# Patient Record
Sex: FEMALE | Race: WHITE | NOT HISPANIC OR LATINO | Employment: UNEMPLOYED | URBAN - METROPOLITAN AREA
[De-identification: names, ages, dates, MRNs, and addresses within clinical notes are randomized per-mention and may not be internally consistent; named-entity substitution may affect disease eponyms.]

---

## 2017-02-27 ENCOUNTER — AMBULATORY - HEALTHEAST (OUTPATIENT)
Dept: VASCULAR SURGERY | Facility: CLINIC | Age: 57
End: 2017-02-27

## 2017-02-27 DIAGNOSIS — C54.1 CANCER OF ENDOMETRIUM (H): ICD-10-CM

## 2017-02-27 DIAGNOSIS — Z85.42 HISTORY OF UTERINE CANCER: ICD-10-CM

## 2017-02-27 DIAGNOSIS — I89.0 ACQUIRED LYMPHEDEMA: ICD-10-CM

## 2017-02-27 DIAGNOSIS — R19.00 ABDOMINAL SWELLING: ICD-10-CM

## 2017-02-27 DIAGNOSIS — I89.0 SECONDARY LYMPHEDEMA: ICD-10-CM

## 2017-05-05 ENCOUNTER — COMMUNICATION - HEALTHEAST (OUTPATIENT)
Dept: VASCULAR SURGERY | Facility: CLINIC | Age: 57
End: 2017-05-05

## 2017-05-08 ENCOUNTER — AMBULATORY - HEALTHEAST (OUTPATIENT)
Dept: VASCULAR SURGERY | Facility: CLINIC | Age: 57
End: 2017-05-08

## 2017-05-08 DIAGNOSIS — Z85.42 HISTORY OF UTERINE CANCER: ICD-10-CM

## 2017-05-08 DIAGNOSIS — R19.00 ABDOMINAL SWELLING: ICD-10-CM

## 2017-05-08 DIAGNOSIS — C54.1 CANCER OF ENDOMETRIUM (H): ICD-10-CM

## 2017-05-08 DIAGNOSIS — I89.0 SECONDARY LYMPHEDEMA: ICD-10-CM

## 2017-09-11 ENCOUNTER — AMBULATORY - HEALTHEAST (OUTPATIENT)
Dept: VASCULAR SURGERY | Facility: CLINIC | Age: 57
End: 2017-09-11

## 2017-09-11 DIAGNOSIS — I89.0 SECONDARY LYMPHEDEMA: ICD-10-CM

## 2017-09-11 DIAGNOSIS — I89.0 ACQUIRED LYMPHEDEMA: ICD-10-CM

## 2017-09-11 DIAGNOSIS — L90.5 SCAR CONDITION AND FIBROSIS OF SKIN: ICD-10-CM

## 2017-11-07 ENCOUNTER — COMMUNICATION - HEALTHEAST (OUTPATIENT)
Dept: VASCULAR SURGERY | Facility: CLINIC | Age: 57
End: 2017-11-07

## 2017-11-13 ENCOUNTER — RECORDS - HEALTHEAST (OUTPATIENT)
Dept: ADMINISTRATIVE | Facility: OTHER | Age: 57
End: 2017-11-13

## 2018-01-26 ENCOUNTER — COMMUNICATION - HEALTHEAST (OUTPATIENT)
Dept: VASCULAR SURGERY | Facility: CLINIC | Age: 58
End: 2018-01-26

## 2018-01-29 ENCOUNTER — COMMUNICATION - HEALTHEAST (OUTPATIENT)
Dept: VASCULAR SURGERY | Facility: CLINIC | Age: 58
End: 2018-01-29

## 2018-01-29 ENCOUNTER — AMBULATORY - HEALTHEAST (OUTPATIENT)
Dept: VASCULAR SURGERY | Facility: CLINIC | Age: 58
End: 2018-01-29

## 2018-01-29 DIAGNOSIS — C54.1 CANCER OF ENDOMETRIUM (H): ICD-10-CM

## 2018-01-29 DIAGNOSIS — I89.0 ACQUIRED LYMPHEDEMA: ICD-10-CM

## 2018-02-01 ENCOUNTER — RECORDS - HEALTHEAST (OUTPATIENT)
Dept: ADMINISTRATIVE | Facility: OTHER | Age: 58
End: 2018-02-01

## 2018-04-18 ENCOUNTER — OFFICE VISIT - HEALTHEAST (OUTPATIENT)
Dept: VASCULAR SURGERY | Facility: CLINIC | Age: 58
End: 2018-04-18

## 2018-04-18 DIAGNOSIS — M25.475 SWELLING OF FOOT JOINT, LEFT: ICD-10-CM

## 2018-04-18 DIAGNOSIS — C43.72 MALIGNANT MELANOMA OF LEFT LOWER EXTREMITY INCLUDING HIP (H): ICD-10-CM

## 2018-04-18 DIAGNOSIS — I89.0 ACQUIRED LYMPHEDEMA: ICD-10-CM

## 2018-04-18 DIAGNOSIS — L90.5 SCAR CONDITION AND FIBROSIS OF SKIN: ICD-10-CM

## 2018-04-18 ASSESSMENT — MIFFLIN-ST. JEOR: SCORE: 1199.11

## 2018-05-07 ENCOUNTER — COMMUNICATION - HEALTHEAST (OUTPATIENT)
Dept: VASCULAR SURGERY | Facility: CLINIC | Age: 58
End: 2018-05-07

## 2018-05-10 ENCOUNTER — COMMUNICATION - HEALTHEAST (OUTPATIENT)
Dept: VASCULAR SURGERY | Facility: CLINIC | Age: 58
End: 2018-05-10

## 2018-05-16 ENCOUNTER — COMMUNICATION - HEALTHEAST (OUTPATIENT)
Dept: VASCULAR SURGERY | Facility: CLINIC | Age: 58
End: 2018-05-16

## 2018-05-16 ENCOUNTER — RECORDS - HEALTHEAST (OUTPATIENT)
Dept: ADMINISTRATIVE | Facility: OTHER | Age: 58
End: 2018-05-16

## 2018-05-17 ENCOUNTER — COMMUNICATION - HEALTHEAST (OUTPATIENT)
Dept: VASCULAR SURGERY | Facility: CLINIC | Age: 58
End: 2018-05-17

## 2018-07-13 ENCOUNTER — COMMUNICATION - HEALTHEAST (OUTPATIENT)
Dept: VASCULAR SURGERY | Facility: CLINIC | Age: 58
End: 2018-07-13

## 2018-07-13 ENCOUNTER — AMBULATORY - HEALTHEAST (OUTPATIENT)
Dept: VASCULAR SURGERY | Facility: CLINIC | Age: 58
End: 2018-07-13

## 2018-07-13 DIAGNOSIS — I89.0 ACQUIRED LYMPHEDEMA OF LEG: ICD-10-CM

## 2018-07-25 ENCOUNTER — RECORDS - HEALTHEAST (OUTPATIENT)
Dept: ADMINISTRATIVE | Facility: OTHER | Age: 58
End: 2018-07-25

## 2018-10-09 ENCOUNTER — AMBULATORY - HEALTHEAST (OUTPATIENT)
Dept: VASCULAR SURGERY | Facility: CLINIC | Age: 58
End: 2018-10-09

## 2018-10-09 ENCOUNTER — COMMUNICATION - HEALTHEAST (OUTPATIENT)
Dept: VASCULAR SURGERY | Facility: CLINIC | Age: 58
End: 2018-10-09

## 2018-10-09 DIAGNOSIS — I89.0 LYMPHEDEMA: ICD-10-CM

## 2018-10-18 ENCOUNTER — RECORDS - HEALTHEAST (OUTPATIENT)
Dept: ADMINISTRATIVE | Facility: OTHER | Age: 58
End: 2018-10-18

## 2018-10-18 ENCOUNTER — OFFICE VISIT - HEALTHEAST (OUTPATIENT)
Dept: VASCULAR SURGERY | Facility: CLINIC | Age: 58
End: 2018-10-18

## 2018-10-18 DIAGNOSIS — C54.1 CANCER OF ENDOMETRIUM (H): ICD-10-CM

## 2018-10-18 DIAGNOSIS — C43.72 MALIGNANT MELANOMA OF LEFT LOWER EXTREMITY INCLUDING HIP (H): ICD-10-CM

## 2018-10-18 DIAGNOSIS — R19.00 ABDOMINAL SWELLING: ICD-10-CM

## 2018-10-18 DIAGNOSIS — I89.0 ACQUIRED LYMPHEDEMA: ICD-10-CM

## 2018-10-18 ASSESSMENT — MIFFLIN-ST. JEOR: SCORE: 1189.13

## 2018-12-20 LAB — HEP C HIM: NORMAL

## 2019-01-09 ENCOUNTER — AMBULATORY - HEALTHEAST (OUTPATIENT)
Dept: VASCULAR SURGERY | Facility: CLINIC | Age: 59
End: 2019-01-09

## 2019-01-09 ENCOUNTER — COMMUNICATION - HEALTHEAST (OUTPATIENT)
Dept: VASCULAR SURGERY | Facility: CLINIC | Age: 59
End: 2019-01-09

## 2019-01-09 DIAGNOSIS — I89.0 ACQUIRED LYMPHEDEMA: ICD-10-CM

## 2019-01-25 ENCOUNTER — COMMUNICATION - HEALTHEAST (OUTPATIENT)
Dept: VASCULAR SURGERY | Facility: CLINIC | Age: 59
End: 2019-01-25

## 2019-01-30 ENCOUNTER — RECORDS - HEALTHEAST (OUTPATIENT)
Dept: ADMINISTRATIVE | Facility: OTHER | Age: 59
End: 2019-01-30

## 2019-01-30 ENCOUNTER — COMMUNICATION - HEALTHEAST (OUTPATIENT)
Dept: VASCULAR SURGERY | Facility: CLINIC | Age: 59
End: 2019-01-30

## 2019-04-08 ENCOUNTER — RECORDS - HEALTHEAST (OUTPATIENT)
Dept: ADMINISTRATIVE | Facility: OTHER | Age: 59
End: 2019-04-08

## 2019-04-08 ENCOUNTER — COMMUNICATION - HEALTHEAST (OUTPATIENT)
Dept: VASCULAR SURGERY | Facility: CLINIC | Age: 59
End: 2019-04-08

## 2019-04-08 ENCOUNTER — AMBULATORY - HEALTHEAST (OUTPATIENT)
Dept: VASCULAR SURGERY | Facility: CLINIC | Age: 59
End: 2019-04-08

## 2019-04-08 DIAGNOSIS — I89.0 ACQUIRED LYMPHEDEMA: ICD-10-CM

## 2019-05-13 ENCOUNTER — COMMUNICATION - HEALTHEAST (OUTPATIENT)
Dept: VASCULAR SURGERY | Facility: CLINIC | Age: 59
End: 2019-05-13

## 2019-07-22 ENCOUNTER — COMMUNICATION - HEALTHEAST (OUTPATIENT)
Dept: VASCULAR SURGERY | Facility: CLINIC | Age: 59
End: 2019-07-22

## 2019-07-22 ENCOUNTER — AMBULATORY - HEALTHEAST (OUTPATIENT)
Dept: VASCULAR SURGERY | Facility: CLINIC | Age: 59
End: 2019-07-22

## 2019-07-22 DIAGNOSIS — I89.0 ACQUIRED LYMPHEDEMA: ICD-10-CM

## 2019-10-24 ENCOUNTER — COMMUNICATION - HEALTHEAST (OUTPATIENT)
Dept: VASCULAR SURGERY | Facility: CLINIC | Age: 59
End: 2019-10-24

## 2019-10-24 ENCOUNTER — COMMUNICATION - HEALTHEAST (OUTPATIENT)
Dept: TELEHEALTH | Facility: CLINIC | Age: 59
End: 2019-10-24

## 2019-10-24 ENCOUNTER — OFFICE VISIT - HEALTHEAST (OUTPATIENT)
Dept: VASCULAR SURGERY | Facility: CLINIC | Age: 59
End: 2019-10-24

## 2019-10-24 DIAGNOSIS — C54.1 CANCER OF ENDOMETRIUM (H): ICD-10-CM

## 2019-10-24 DIAGNOSIS — R19.00 ABDOMINAL SWELLING: ICD-10-CM

## 2019-10-24 DIAGNOSIS — I89.0 ACQUIRED LYMPHEDEMA: ICD-10-CM

## 2019-10-24 DIAGNOSIS — C43.72 MALIGNANT MELANOMA OF LEFT LOWER EXTREMITY INCLUDING HIP (H): ICD-10-CM

## 2019-10-24 ASSESSMENT — MIFFLIN-ST. JEOR: SCORE: 1212.71

## 2019-12-05 ENCOUNTER — RECORDS - HEALTHEAST (OUTPATIENT)
Dept: ADMINISTRATIVE | Facility: OTHER | Age: 59
End: 2019-12-05

## 2020-01-01 ENCOUNTER — HEALTH MAINTENANCE LETTER (OUTPATIENT)
Age: 60
End: 2020-01-01

## 2020-01-16 ENCOUNTER — RECORDS - HEALTHEAST (OUTPATIENT)
Dept: ADMINISTRATIVE | Facility: OTHER | Age: 60
End: 2020-01-16

## 2020-01-16 ENCOUNTER — AMBULATORY - HEALTHEAST (OUTPATIENT)
Dept: VASCULAR SURGERY | Facility: CLINIC | Age: 60
End: 2020-01-16

## 2020-01-16 ENCOUNTER — COMMUNICATION - HEALTHEAST (OUTPATIENT)
Dept: VASCULAR SURGERY | Facility: CLINIC | Age: 60
End: 2020-01-16

## 2020-01-16 DIAGNOSIS — L90.5 SCAR CONDITION AND FIBROSIS OF SKIN: ICD-10-CM

## 2020-01-16 DIAGNOSIS — C43.72 MALIGNANT MELANOMA OF LEFT LOWER EXTREMITY INCLUDING HIP (H): ICD-10-CM

## 2020-01-16 DIAGNOSIS — I89.0 ACQUIRED LYMPHEDEMA OF LEG: ICD-10-CM

## 2020-01-16 DIAGNOSIS — C54.1 CANCER OF ENDOMETRIUM (H): ICD-10-CM

## 2020-01-16 DIAGNOSIS — I89.0 LYMPHEDEMA: ICD-10-CM

## 2020-01-16 DIAGNOSIS — R19.00 ABDOMINAL SWELLING: ICD-10-CM

## 2020-01-16 DIAGNOSIS — I89.0 ACQUIRED LYMPHEDEMA: ICD-10-CM

## 2020-04-15 ENCOUNTER — COMMUNICATION - HEALTHEAST (OUTPATIENT)
Dept: VASCULAR SURGERY | Facility: CLINIC | Age: 60
End: 2020-04-15

## 2020-04-15 DIAGNOSIS — I89.0 LYMPHEDEMA: ICD-10-CM

## 2020-04-20 ENCOUNTER — COMMUNICATION - HEALTHEAST (OUTPATIENT)
Dept: VASCULAR SURGERY | Facility: CLINIC | Age: 60
End: 2020-04-20

## 2020-04-21 ENCOUNTER — RECORDS - HEALTHEAST (OUTPATIENT)
Dept: ADMINISTRATIVE | Facility: OTHER | Age: 60
End: 2020-04-21

## 2020-05-06 ENCOUNTER — RECORDS - HEALTHEAST (OUTPATIENT)
Dept: ADMINISTRATIVE | Facility: OTHER | Age: 60
End: 2020-05-06

## 2020-06-30 ENCOUNTER — COMMUNICATION - HEALTHEAST (OUTPATIENT)
Dept: VASCULAR SURGERY | Facility: CLINIC | Age: 60
End: 2020-06-30

## 2020-07-01 ENCOUNTER — COMMUNICATION - HEALTHEAST (OUTPATIENT)
Dept: VASCULAR SURGERY | Facility: CLINIC | Age: 60
End: 2020-07-01

## 2020-07-01 ENCOUNTER — RECORDS - HEALTHEAST (OUTPATIENT)
Dept: ADMINISTRATIVE | Facility: OTHER | Age: 60
End: 2020-07-01

## 2020-07-01 ENCOUNTER — AMBULATORY - HEALTHEAST (OUTPATIENT)
Dept: VASCULAR SURGERY | Facility: CLINIC | Age: 60
End: 2020-07-01

## 2020-07-01 DIAGNOSIS — I89.0 ACQUIRED LYMPHEDEMA OF LEG: ICD-10-CM

## 2020-07-01 DIAGNOSIS — R19.00 ABDOMINAL SWELLING: ICD-10-CM

## 2020-07-10 ENCOUNTER — RECORDS - HEALTHEAST (OUTPATIENT)
Dept: ADMINISTRATIVE | Facility: OTHER | Age: 60
End: 2020-07-10

## 2020-08-31 ENCOUNTER — TRANSFERRED RECORDS (OUTPATIENT)
Dept: HEALTH INFORMATION MANAGEMENT | Facility: CLINIC | Age: 60
End: 2020-08-31

## 2020-09-09 ENCOUNTER — TRANSFERRED RECORDS (OUTPATIENT)
Dept: HEALTH INFORMATION MANAGEMENT | Facility: CLINIC | Age: 60
End: 2020-09-09

## 2020-09-10 ENCOUNTER — TRANSFERRED RECORDS (OUTPATIENT)
Dept: HEALTH INFORMATION MANAGEMENT | Facility: CLINIC | Age: 60
End: 2020-09-10

## 2020-09-10 LAB
CHOLEST SERPL-MCNC: 240 MG/DL (ref 100–199)
CREAT SERPL-MCNC: 0.72 MG/DL (ref 0.57–1.11)
GFR SERPL CREATININE-BSD FRML MDRD: >60 ML/MIN/1.73M2
GLUCOSE SERPL-MCNC: 92 MG/DL (ref 65–100)
HBA1C MFR BLD: 5.4 % (ref 0–5.7)
HDLC SERPL-MCNC: 70 MG/DL
LDLC SERPL CALC-MCNC: 149 MG/DL
NONHDLC SERPL-MCNC: 170 MG/DL
POTASSIUM SERPL-SCNC: 4.1 MMOL/L (ref 3.5–5)
TRIGL SERPL-MCNC: 105 MG/DL

## 2020-09-11 ENCOUNTER — TELEPHONE (OUTPATIENT)
Dept: OPHTHALMOLOGY | Facility: CLINIC | Age: 60
End: 2020-09-11

## 2020-09-11 ENCOUNTER — TRANSFERRED RECORDS (OUTPATIENT)
Dept: HEALTH INFORMATION MANAGEMENT | Facility: CLINIC | Age: 60
End: 2020-09-11

## 2020-09-11 NOTE — TELEPHONE ENCOUNTER
[facility] number-- [phone]   Cell-- [phone]      Pt seen by Dr. Castañeda 2 days ago    Right eye vision changes since august 29th.    Pt lost 1/3 part of vision august 29th with worsening of peripheral vision since    Swollen optic nerve right eye     Pt has had many tests unremarkable    MRI and carotid testing done today at [facility]     Vision worse in last 1-2 days    Scheduled next weds AM     Note to on call to review plan for this Friday evening/Weekend as vision loss worsened past 2 days    Geoffrey Ariza RN 5:08 PM 09/11/20                  [facility]    Phone Message    May a detailed message be left on voicemail: yes     Reason for Call: Appointment Intake    Referring Provider Name: Toby Castañeda M.D.   Diagnosis and/or Symptoms: optic nerve swelling - and now interfering with vision   Referring DrArmida Referring directly to Dr. Brumfield     Would like to be seen ASAP if possible - please advise     Action Taken: Message routed to:  [facility]     Travel Screening: Not Applicable

## 2020-09-14 ENCOUNTER — OFFICE VISIT (OUTPATIENT)
Dept: OPHTHALMOLOGY | Facility: CLINIC | Age: 60
End: 2020-09-14
Attending: OPHTHALMOLOGY
Payer: COMMERCIAL

## 2020-09-14 ENCOUNTER — ANCILLARY PROCEDURE (OUTPATIENT)
Dept: GENERAL RADIOLOGY | Facility: CLINIC | Age: 60
End: 2020-09-14
Attending: OPHTHALMOLOGY
Payer: COMMERCIAL

## 2020-09-14 ENCOUNTER — TELEPHONE (OUTPATIENT)
Dept: OPHTHALMOLOGY | Facility: CLINIC | Age: 60
End: 2020-09-14

## 2020-09-14 DIAGNOSIS — H53.40 VISUAL FIELD DEFECT: Primary | ICD-10-CM

## 2020-09-14 DIAGNOSIS — H46.9 OPTIC NEURITIS: ICD-10-CM

## 2020-09-14 DIAGNOSIS — H53.40 VISUAL FIELD DEFECT: ICD-10-CM

## 2020-09-14 DIAGNOSIS — H46.9 OPTIC NEURITIS: Primary | ICD-10-CM

## 2020-09-14 LAB — MISCELLANEOUS TEST: NORMAL

## 2020-09-14 PROCEDURE — 92133 CPTRZD OPH DX IMG PST SGM ON: CPT | Mod: ZF | Performed by: OPHTHALMOLOGY

## 2020-09-14 PROCEDURE — 84999 UNLISTED CHEMISTRY PROCEDURE: CPT | Performed by: OPHTHALMOLOGY

## 2020-09-14 PROCEDURE — 92083 EXTENDED VISUAL FIELD XM: CPT | Mod: ZF | Performed by: OPHTHALMOLOGY

## 2020-09-14 PROCEDURE — 86682 HELMINTH ANTIBODY: CPT | Performed by: OPHTHALMOLOGY

## 2020-09-14 PROCEDURE — G0463 HOSPITAL OUTPT CLINIC VISIT: HCPCS | Mod: ZF

## 2020-09-14 RX ORDER — PREDNISONE 50 MG/1
1250 TABLET ORAL DAILY
Qty: 3 TABLET | Refills: 0 | Status: SHIPPED | OUTPATIENT
Start: 2020-09-14 | End: 2020-11-04

## 2020-09-14 RX ORDER — PREDNISONE 20 MG/1
60 TABLET ORAL DAILY
Qty: 90 TABLET | Refills: 1 | Status: SHIPPED | OUTPATIENT
Start: 2020-09-14 | End: 2020-11-10

## 2020-09-14 ASSESSMENT — VISUAL ACUITY
CORRECTION_TYPE: GLASSES
OS_CC: 20/20
OD_CC: 20/30
OD_CC+: -1
METHOD: SNELLEN - LINEAR

## 2020-09-14 ASSESSMENT — EXTERNAL EXAM - RIGHT EYE: OD_EXAM: NORMAL

## 2020-09-14 ASSESSMENT — SLIT LAMP EXAM - LIDS
COMMENTS: NORMAL
COMMENTS: NORMAL

## 2020-09-14 ASSESSMENT — CONF VISUAL FIELD
OD_SUPERIOR_NASAL_RESTRICTION: 3
OS_NORMAL: 1
OD_INFERIOR_NASAL_RESTRICTION: 3
OD_SUPERIOR_TEMPORAL_RESTRICTION: 3
OD_INFERIOR_TEMPORAL_RESTRICTION: 3

## 2020-09-14 ASSESSMENT — REFRACTION_WEARINGRX
OS_SPHERE: -5.25
OS_AXIS: 099
SPECS_TYPE: PAL
OD_ADD: +2.25
OS_ADD: +2.25
OD_SPHERE: -9.00
OD_CYLINDER: +3.00
OD_AXIS: 076
OS_CYLINDER: +0.25

## 2020-09-14 ASSESSMENT — EXTERNAL EXAM - LEFT EYE: OS_EXAM: NORMAL

## 2020-09-14 ASSESSMENT — TONOMETRY
IOP_METHOD: ICARE
OS_IOP_MMHG: 13
OD_IOP_MMHG: 14

## 2020-09-14 ASSESSMENT — CUP TO DISC RATIO: OS_RATIO: 0.1

## 2020-09-14 NOTE — TELEPHONE ENCOUNTER
[facility] Call [facility]    Phone Message    May a detailed message be left on voicemail: yes     Reason for Call: Other: Pt calling back said she talked with Geoffrey and he was going to have on call  call her back and he /she never did , her symptoms have stayed about the same since last time she talked with Geoffrey/Care Team,  Please call Pt back  Thank You,    Action Taken: Message routed to:  [facility]    Travel Screening: Not Applicable

## 2020-09-14 NOTE — TELEPHONE ENCOUNTER
Spoke to patient.  We had a cancellation for this afternoon so instead of having a provider call her we will just see her in clinic.  Patient aware of change in location.    Kristi Flores on 9/14/2020 at 8:53 AM

## 2020-09-14 NOTE — PROGRESS NOTES
Assessment & Plan     Alexa Kline is a 60 year old female with the following diagnoses:   1. Optic neuritis    2. Visual field defect         Alexa Kline is a 60 year old White female who presents to neuro-ophthalmology clinic today for consultation from Dr. Castañeda for vision loss in right eye on 8/29. She reports vision loss in the inferior visual field in the right eye that happened acutely. It initially worsened for 3 days and then stabilized. She has also lost peripheral vision in that eye. She reports mild eye pain with eye movement. She also reports headaches from straining her eyes for which she has been taking Tylenol and Ibuprofen. She denies jaw pain, diplopia, fever, temporal headaches. She denies history of stroke or any other neurological conditions. She has not been prescribed steroids yet. She is wondering if her peripheral vision in left eye is decreasing as well since she has been straining her left eye to see.     POH: refractive error, history of blepharoplasty, dry eyes, Posterior vitreous detachement  PMH: No diabetes mellitus, hypertension (blood pressure runs low).  Lymphedema, mild bronchiectasis   FH: Uveitis (HLA-B27 positive)- Dad, sister  Meds:    MRI Brain and Orbit (9/11/2020)  1.  Abnormal signal and diffusion signal abnormality, enlargement and  enhancement involving the intraorbital segment of the right optic nerve.  Diagnostic considerations include acute optic neuritis, ischemic optic  neuropathy, or less likely primary optic nerve neoplasm.  2.  No evidence of acute intracranial hemorrhage, mass effect, or infarction.    Carotid US (9/11/2020)  IMPRESSION:  1.  Mild plaque formation, velocities consistent with less than 50% stenosis in the right internal carotid artery.  2.  Mild plaque formation, velocities consistent with less than 50% stenosis in the left internal carotid artery.  3.  Flow within the vertebral arteries is antegrade.    ESR: 6  CRP <0.02  Cholesterol:  240 (high)  LDL: 149 (high)      Visual acuity is 20/30 in right eye and 20/20 in left eye. Intraocular pressure motility are normal. Pupils show 4+ afferent pupillary defect in right eye. Color plates shows 1/11 in right eye and normal in left. Confrontational visual fields shows peripheral vision loss in right eye and normal in left eye. Slit lamp exam shows conjunctivochalasis and mild cataract in both eyes . Dilated fundus exam is notable for 2-3+ disc edema in right eye and very small cup to disc ration in left eye. Additionally, the vessels in right eye are engorged    Visual fields shows diffuse vision loss in right eye and mild peripheral vision loss in left eye although there is high false positive error. OCT rNFL shows elevation in right optic nerve and thinning in left optic nerve    It is my impression that has atypical optic neuritis and will need evaluation for AQP4 and MOG. I reviewed her MRI images personally and they showed a cystic lesion in the perineural region of the orbit.  There appears to be an area of enhancement of the nerve posterior to this.  We will start her on prednisone 1250 mg daily for 3 days and then I would have her decrease to prednisone 60 mg for a week, then 50 mg a day for a week, 40 mg a day for a week, 30 mg a day for a week, 20 mg a day for a week, 10 mg a day for a week   Follow up 1-2 weeks.  She is to call sooner as needed for worsening symptoms.            Attending Physician Attestation:  Complete documentation of historical and exam elements from today's encounter can be found in the full encounter summary report (not reduplicated in this progress note).  I personally obtained the chief complaint(s) and history of present illness.  I confirmed and edited as necessary the review of systems, past medical/surgical history, family history, social history, and examination findings as documented by others; and I examined the patient myself.  I personally reviewed the  relevant tests, images, and reports as documented above.  I formulated and edited as necessary the assessment and plan and discussed the findings and management plan with the patient and family. I personally reviewed the ophthalmic test(s) associated with this encounter, agree with the interpretation(s) as documented by the resident/fellow, and have edited the corresponding report(s) as necessary.  - Saul Davis MD  Ophthalmology PGY-3  [facility]

## 2020-09-14 NOTE — Clinical Note
9/14/2020       RE: Alexa Kline  [address] [address]     Dear Colleague,    Thank you for referring your patient, Alexa Kline, to the EYE CLINIC at [facility]. Please see a copy of my visit note below.         Assessment & Plan     Alexa Kline is a 60 year old female with the following diagnoses:   1. Optic neuritis    2. Visual field defect         Alexa Kline is a 60 year old White female who presents to neuro-ophthalmology clinic today for consultation from Dr. Castañeda for vision loss in right eye on 8/29. She reports vision loss in the inferior visual field in the right eye that happened acutely. It initially worsened for 3 days and then stabilized. She has also lost peripheral vision in that eye. She reports mild eye pain with eye movement. She also reports headaches from straining her eyes for which she has been taking Tylenol and Ibuprofen. She denies jaw pain, diplopia, fever, temporal headaches. She denies history of stroke or any other neurological conditions. She has not been prescribed steroids yet. She is wondering if her peripheral vision in left eye is decreasing as well since she has been straining her left eye to see.     POH: refractive error, history of blepharoplasty, dry eyes, Posterior vitreous detachement  PMH: No diabetes mellitus, hypertension (blood pressure runs low).  Lymphedema, mild bronchiectasis   FH: Uveitis (HLA-B27 positive)- Dad, sister  Meds:    MRI Brain and Orbit (9/11/2020)  1.  Abnormal signal and diffusion signal abnormality, enlargement and  enhancement involving the intraorbital segment of the right optic nerve.  Diagnostic considerations include acute optic neuritis, ischemic optic  neuropathy, or less likely primary optic nerve neoplasm.  2.  No evidence of acute intracranial hemorrhage, mass effect, or infarction.    Carotid US (9/11/2020)  IMPRESSION:  1.  Mild plaque formation, velocities consistent with  less than 50% stenosis in the right internal carotid artery.  2.  Mild plaque formation, velocities consistent with less than 50% stenosis in the left internal carotid artery.  3.  Flow within the vertebral arteries is antegrade.    ESR: 6  CRP <0.02  Cholesterol: 240 (high)  LDL: 149 (high)      Visual acuity is 20/30 in right eye and 20/20 in left eye. Intraocular pressure motility are normal. Pupils show 4+ afferent pupillary defect in right eye. Color plates shows 1/11 in right eye and normal in left. Confrontational visual fields shows peripheral vision loss in right eye and normal in left eye. Slit lamp exam shows conjunctivochalasis and mild cataract in both eyes . Dilated fundus exam is notable for 2-3+ disc edema in right eye and very small cup to disc ration in left eye. Additionally, the vessels in right eye are engorged    Visual fields shows diffuse vision loss in right eye and mild peripheral vision loss in left eye although there is high false positive error. OCT rNFL shows elevation in right optic nerve and thinning in left optic nerve    It is my impression that has atypical optic neuritis and will need evaluation for AQP4 and MOG. I reviewed her MRI images personally and they showed a cystic lesion in the perineural region of the orbit.  There appears to be an area of enhancement of the nerve posterior to this.  We will start her on prednisone 1250 mg daily for 3 days and then I would have her decrease to prednisone 60 mg for a week, then 50 mg a day for a week, 40 mg a day for a week, 30 mg a day for a week, 20 mg a day for a week, 10 mg a day for a week   Follow up 1-2 weeks.  She is to call sooner as needed for worsening symptoms.            Attending Physician Attestation:  Complete documentation of historical and exam elements from today's encounter can be found in the full encounter summary report (not reduplicated in this progress note).  I personally obtained the chief complaint(s) and  history of present illness.  I confirmed and edited as necessary the review of systems, past medical/surgical history, family history, social history, and examination findings as documented by others; and I examined the patient myself.  I personally reviewed the relevant tests, images, and reports as documented above.  I formulated and edited as necessary the assessment and plan and discussed the findings and management plan with the patient and family. I personally reviewed the ophthalmic test(s) associated with this encounter, agree with the interpretation(s) as documented by the resident/fellow, and have edited the corresponding report(s) as necessary.  - Saul Davis MD  Ophthalmology PGY-3  [facility]    Again, thank you for allowing me to participate in the care of your patient.      Sincerely,    Saul Brumfield MD

## 2020-09-14 NOTE — PROGRESS NOTES
Brief Telephone Encounter:    Patient called regarding difficulty filling a medication.    Patient was seen earlier today in the Neurophthalmology clinic by Dr. Brumfield, and found to have optic neuritis in the right eye.    She was prescribed prednisone 1250 mg to use for 3 days along with a taper plan.     Pharmacy had some concerns regarding this dosage, thus pharmacy was called and I explained the situation of why this medication is very much needed for the patient.    Spoke with both the patient and pharmacy to expedite the process for patient to receive this medication.     Pharmacy is [facility] on [address] in [place].    - Patient will be provided Prednisone 1250 mg for 3 days.  - Patient will also start Pepcid 40 mg daily for gastric protection.  - Confirmed plan of 1250 mg x 3 days, then taper plan confirmed with patient (60 mg x 1 week; 50 mg mg x 1 week; 40 mg x 1 week; 30 mg x 1 week; 20 mg x 1 week; 10 mg x 1 week).   - Emergent precaution discussed with patient.     Alberto Rolon MD  Department of Ophthalmology  Pager: [phone]

## 2020-09-14 NOTE — NURSING NOTE
Chief Complaint(s) and History of Present Illness(es)     New Patient     In right eye (Consult for vision loss).  Since onset it is rapidly worsening.  Associated symptoms include eye pain and headache.  Negative for double vision.              Comments     Patient states onset of vision loss in right eye was on 8/29. Noticed peripheral visual loss as well. Worsened in the past 3 days but stable since.  Initially it was only lower third of the right eye that was blurry.  +headaches possibly due to straining (taking tylenol and ibuprofen)    SHE Chapman 9/14/2020 1:00 PM

## 2020-09-14 NOTE — LETTER
2020         RE:  :  MRN: Alexa Kline  1960  [id number]     Dear Dr. Castañeda,    Thank you for asking me to see your very pleasant patient, Alexa Kline, in neuro-ophthalmic consultation.  I would like to thank you for sending your records and I have summarized them in the history of present illness.  My assessment and plan are below.  For further details, please see my attached clinic note.      Assessment & Plan     Alexa Kline is a 60 year old female with the following diagnoses:   1. Optic neuritis    2. Visual field defect         Alexa Kline is a 60 year old White female who presents to neuro-ophthalmology clinic today for consultation from Dr. Castañeda for vision loss in right eye on . She reports vision loss in the inferior visual field in the right eye that happened acutely. It initially worsened for 3 days and then stabilized. She has also lost peripheral vision in that eye. She reports mild eye pain with eye movement. She also reports headaches from straining her eyes for which she has been taking Tylenol and Ibuprofen. She denies jaw pain, diplopia, fever, temporal headaches. She denies history of stroke or any other neurological conditions. She has not been prescribed steroids yet. She is wondering if her peripheral vision in left eye is decreasing as well since she has been straining her left eye to see.     POH: refractive error, history of blepharoplasty, dry eyes, Posterior vitreous detachement  PMH: No diabetes mellitus, hypertension (blood pressure runs low).  Lymphedema, mild bronchiectasis   FH: Uveitis (HLA-B27 positive)- Dad, sister  Meds:    MRI Brain and Orbit (2020)  1.  Abnormal signal and diffusion signal abnormality, enlargement and  enhancement involving the intraorbital segment of the right optic nerve.  Diagnostic considerations include acute optic neuritis, ischemic optic  neuropathy, or less likely primary optic nerve neoplasm.  2.  No evidence of acute  intracranial hemorrhage, mass effect, or infarction.    Carotid US (9/11/2020)  IMPRESSION:  1.  Mild plaque formation, velocities consistent with less than 50% stenosis in the right internal carotid artery.  2.  Mild plaque formation, velocities consistent with less than 50% stenosis in the left internal carotid artery.  3.  Flow within the vertebral arteries is antegrade.    ESR: 6  CRP <0.02  Cholesterol: 240 (high)  LDL: 149 (high)      Visual acuity is 20/30 in right eye and 20/20 in left eye. Intraocular pressure motility are normal. Pupils show 4+ afferent pupillary defect in right eye. Color plates shows 1/11 in right eye and normal in left. Confrontational visual fields shows peripheral vision loss in right eye and normal in left eye. Slit lamp exam shows conjunctivochalasis and mild cataract in both eyes . Dilated fundus exam is notable for 2-3+ disc edema in right eye and very small cup to disc ration in left eye. Additionally, the vessels in right eye are engorged    Visual fields shows diffuse vision loss in right eye and mild peripheral vision loss in left eye although there is high false positive error. OCT rNFL shows elevation in right optic nerve and thinning in left optic nerve    It is my impression that has atypical optic neuritis and will need evaluation for AQP4 and MOG. I reviewed her MRI images personally and they showed a cystic lesion in the perineural region of the orbit.  There appears to be an area of enhancement of the nerve posterior to this.  We will start her on prednisone 1250 mg daily for 3 days and then I would have her decrease to prednisone 60 mg for a week, then 50 mg a day for a week, 40 mg a day for a week, 30 mg a day for a week, 20 mg a day for a week, 10 mg a day for a week   Follow up 1-2 weeks.  She is to call sooner as needed for worsening symptoms.          Again, thank you for allowing me to participate in the care of your patient.      Sincerely,    Saul Brumfield,  MD  Professor  Ophthalmology Residency   Director of Neuro-Ophthalmology  [employer] [facility] [employer] Endowed Chair  Departments of Ophthalmology, Neurology, and Neurosurgery  [facility] [address] [place] [address]  [address]  T - [phone]  F - [phone]  ANTHONY [email or web address]    CC: Toby Castañeda MD  [facility]  [address]  [facility] [address]  VIA Facsimile: [phone]    DX = atypical optic neuritis, cystic lesion on MRI

## 2020-09-14 NOTE — TELEPHONE ENCOUNTER
The patient called and noted that she thought she would get a call from the on call this doctor this weekend.  She has an appointment this week.  I do not see the notes in the system yet.  She would like reassurance that she can wait until Wednesday for her appointment. I will ask the Neuro resident to give her a call.

## 2020-09-15 ENCOUNTER — TELEPHONE (OUTPATIENT)
Dept: OPHTHALMOLOGY | Facility: CLINIC | Age: 60
End: 2020-09-15

## 2020-09-15 DIAGNOSIS — H46.9 OPTIC NEURITIS: Primary | ICD-10-CM

## 2020-09-15 LAB
ANA SER QL IF: NEGATIVE
B BURGDOR IGG+IGM SER QL: 0.08 (ref 0–0.89)
T PALLIDUM AB SER QL: NONREACTIVE

## 2020-09-15 RX ORDER — BIOTIN 1 MG
2500 TABLET ORAL DAILY
COMMUNITY

## 2020-09-15 RX ORDER — CETIRIZINE HYDROCHLORIDE 10 MG/1
10 TABLET ORAL DAILY
COMMUNITY

## 2020-09-15 RX ORDER — MOMETASONE FUROATE MONOHYDRATE 50 UG/1
1 SPRAY, METERED NASAL 2 TIMES DAILY
COMMUNITY

## 2020-09-15 RX ORDER — MULTIVIT-MIN/IRON/FOLIC ACID/K 18-600-40
50 CAPSULE ORAL DAILY
COMMUNITY

## 2020-09-15 RX ORDER — AMOXICILLIN 500 MG
1200 CAPSULE ORAL DAILY
COMMUNITY

## 2020-09-15 RX ORDER — CYCLOSPORINE 0.5 MG/ML
1 EMULSION OPHTHALMIC 2 TIMES DAILY
COMMUNITY

## 2020-09-15 NOTE — TELEPHONE ENCOUNTER
Spoke to patient about results of labs so far being unremarkable.  Still waiting on a few labs.  Will be in touch once those come back.     Kristi Flores on 9/15/2020 at 12:48 PM

## 2020-09-15 NOTE — TELEPHONE ENCOUNTER
Would hold on wine while on 1250. Can have it while on 60.  Should take something for her stomach though like prilosec over the counter or tums    Left message at 1110 Updated pt with information above per Dr. Brumfield at 1110  Provided direct number for any further review      Geoffrey Ariza RN 11:10 AM 09/15/20          Updated med list for patient reported medications    Pt uses naprosyn for wrist pain/typing   Reviewed may hold while on prednisone     Reviewed [employer] rep number and website      Pt holding on glass of wine while on high dose prednisone for three days    Pt would like to have glass of wine as typically does on Friday or Saturday    --pharmacy script stated to f/u with doctor     Reviewed ok for glass of wine on Friday or Saturday-- reviewed would forward to Dr. Brumfield and contact pt if recommends holding and contact pt if would like to hold    Pt verbally demonstrated understanding    Geoffrey Ariza RN 10:51 AM 09/15/20      [facility]    Phone Message    May a detailed message be left on voicemail: yes     Reason for Call: Other: Pt's would like a call back about taking predniSONE (DELTASONE) 50 MG tablet  & 20 MG and If she is able to drink on this medication and also If they had a list of the other medications she is taking from her [facility], she would like a call back to discuss  Thank you,    Action Taken: Message routed to:  [facility]    Travel Screening: Not Applicable

## 2020-09-16 LAB — ACE SERPL-CCNC: 12 U/L (ref 9–67)

## 2020-09-17 LAB
MISCELLANEOUS TEST: NORMAL
RESULT: NORMAL
SEND OUTS MISC TEST CODE: NORMAL
SEND OUTS MISC TEST SPECIMEN: NORMAL
TEST NAME: NORMAL

## 2020-09-18 ENCOUNTER — TELEPHONE (OUTPATIENT)
Dept: OPHTHALMOLOGY | Facility: CLINIC | Age: 60
End: 2020-09-18

## 2020-09-18 NOTE — TELEPHONE ENCOUNTER
Spoke to patient.  Vision has improved from 75% missing vision to 50% missing vision.  Told her that all of her labs are normal but waiting for MOG and AQP4 still.      She told me that she had a crown a few months ago and required extensive antibiotics.  She is convinced that she had cryptosporidiosis in June because had diarrhea. She lost about 10lbs. She is wondering if she has COVID.      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

## 2020-09-21 LAB
RESULT: NORMAL
SEND OUTS MISC TEST CODE: NORMAL
SEND OUTS MISC TEST SPECIMEN: NORMAL
TEST NAME: NORMAL

## 2020-09-24 ENCOUNTER — OFFICE VISIT (OUTPATIENT)
Dept: OPHTHALMOLOGY | Facility: CLINIC | Age: 60
End: 2020-09-24
Attending: OPHTHALMOLOGY
Payer: COMMERCIAL

## 2020-09-24 ENCOUNTER — TELEPHONE (OUTPATIENT)
Dept: OPHTHALMOLOGY | Facility: CLINIC | Age: 60
End: 2020-09-24

## 2020-09-24 DIAGNOSIS — H53.40 VISUAL FIELD DEFECT: ICD-10-CM

## 2020-09-24 DIAGNOSIS — B00.89 HERPES SIMPLEX VIRUS TYPE 1 (HSV-1) DERMATITIS: ICD-10-CM

## 2020-09-24 DIAGNOSIS — H46.9 OPTIC NEURITIS: Primary | ICD-10-CM

## 2020-09-24 DIAGNOSIS — H53.40 VISUAL FIELD DEFECT: Primary | ICD-10-CM

## 2020-09-24 PROCEDURE — 92133 CPTRZD OPH DX IMG PST SGM ON: CPT | Mod: ZF | Performed by: OPHTHALMOLOGY

## 2020-09-24 PROCEDURE — 92083 EXTENDED VISUAL FIELD XM: CPT | Mod: ZF | Performed by: OPHTHALMOLOGY

## 2020-09-24 PROCEDURE — G0463 HOSPITAL OUTPT CLINIC VISIT: HCPCS | Mod: ZF | Performed by: TECHNICIAN/TECHNOLOGIST

## 2020-09-24 RX ORDER — ACYCLOVIR 50 MG/G
CREAM TOPICAL
Qty: 5 G | Refills: 0 | Status: SHIPPED | OUTPATIENT
Start: 2020-09-24 | End: 2020-11-04

## 2020-09-24 ASSESSMENT — CONF VISUAL FIELD
METHOD: COUNTING FINGERS
OS_NORMAL: 1
OD_NORMAL: 1

## 2020-09-24 ASSESSMENT — REFRACTION_WEARINGRX
SPECS_TYPE: PAL
OS_CYLINDER: +0.25
OS_ADD: +2.25
OS_AXIS: 099
OD_AXIS: 076
OD_CYLINDER: +3.00
OD_ADD: +2.25
OS_SPHERE: -5.25
OD_SPHERE: -9.00

## 2020-09-24 ASSESSMENT — TONOMETRY
OD_IOP_MMHG: 14
IOP_METHOD: ICARE
OS_IOP_MMHG: 13

## 2020-09-24 ASSESSMENT — EXTERNAL EXAM - LEFT EYE: OS_EXAM: NORMAL

## 2020-09-24 ASSESSMENT — EXTERNAL EXAM - RIGHT EYE: OD_EXAM: NORMAL

## 2020-09-24 ASSESSMENT — SLIT LAMP EXAM - LIDS
COMMENTS: NORMAL
COMMENTS: NORMAL

## 2020-09-24 ASSESSMENT — CUP TO DISC RATIO
OS_RATIO: 0.3
OD_RATIO: 0.3

## 2020-09-24 ASSESSMENT — VISUAL ACUITY
CORRECTION_TYPE: GLASSES
OS_CC: 20/20
METHOD: SNELLEN - LINEAR
OD_CC: 20/30

## 2020-09-24 NOTE — TELEPHONE ENCOUNTER
Note to PA team to initiate prior authorization    Geoffrey Ariza RN 5:30 PM 09/24/20          [facility]    Phone Message    May a detailed message be left on voicemail: yes     Reason for Call: Medication Question or concern regarding medication   Prescription Clarification  Name of Medication: ACYCLOVIR  Prescribing Provider: YVONNE   Pharmacy: [facility]Y #[id number] - [place], [place] - [address] AT [address] [facility]    What on the order needs clarification? Pt left a voicemail this morning for Harleen to alert her that a prior auth needs to be started for this rx. Pt would like this to be completed asap! Please follow-up with pt with status updates.       Action Taken: Other:   eye    Travel Screening: Not Applicable

## 2020-09-24 NOTE — PROGRESS NOTES
Assessment & Plan     Alexa Kline is a 60 year old female with the following diagnoses:   1. Optic neuritis    2. Visual field defect       Patient is here for 10 day follow up of optic neuritis RIGHT eye.  We started her on high dose steroids for 3 days followed by slow taper.  She is currently using Prednisone 50 mg starting today.  Lab work for AQP4, MOG, lyme, treponema, Anti-nuclear antibody, ace, cysticercosis all unremarkable.      MRI   1.  Abnormal signal and diffusion signal abnormality, enlargement and  enhancement involving the intraorbital segment of the right optic nerve.  Diagnostic considerations include acute optic neuritis, ischemic optic  neuropathy, or less likely primary optic nerve neoplasm.  2.  No evidence of acute intracranial hemorrhage, mass effect, or infarction.    It is my impression that patient has atypical optic neuritis.  She had this ring enhancing lesion on her right optic nerve.  Lab work up unremarkable.  She is currently using prednisone 50 mg daily.  Discussed option of continued slow taper of prednisone versus quick taper with repeating MRI after as her MRI showed cystic lesion in the perineural region of the orbit with an area of enhancement of the nerve posterior to this. Will continue slow taper of prednisone decreasing by 10 mg daily per week and repeat MRI in early November with follow up same day.   It is possible this is due to covid and will test covid antibodies.     She has a vesicular lesion on the tip of her nose.   I will have her use Zovrax 5% cream five times daily.              Attending Physician Attestation:  Complete documentation of historical and exam elements from today's encounter can be found in the full encounter summary report (not reduplicated in this progress note).  I personally obtained the chief complaint(s) and history of present illness.  I confirmed and edited as necessary the review of systems, past medical/surgical history, family  history, social history, and examination findings as documented by others; and I examined the patient myself.  I personally reviewed the relevant tests, images, and reports as documented above.  I formulated and edited as necessary the assessment and plan and discussed the findings and management plan with the patient and family. - Saul Brumfield MD

## 2020-09-24 NOTE — LETTER
2020         RE:  :  MRN: Alexa Kline  1960  [id number]     Dear Dr. Castañeda,    Your patient, Alexa Kline, returned for neuro-ophthalmic follow up. My assessment and plan are below.  For further details, please see my attached clinic note.        Assessment & Plan     Alexa Kline is a 60 year old female with the following diagnoses:   1. Optic neuritis    2. Visual field defect       Patient is here for 10 day follow up of optic neuritis RIGHT eye.  We started her on high dose steroids for 3 days followed by slow taper.  She is currently using Prednisone 50 mg starting today.  Lab work for AQP4, MOG, lyme, treponema, Anti-nuclear antibody, ace, cysticercosis all unremarkable.      MRI   1.  Abnormal signal and diffusion signal abnormality, enlargement and  enhancement involving the intraorbital segment of the right optic nerve.  Diagnostic considerations include acute optic neuritis, ischemic optic  neuropathy, or less likely primary optic nerve neoplasm.  2.  No evidence of acute intracranial hemorrhage, mass effect, or infarction.    It is my impression that patient has atypical optic neuritis.  She had this ring enhancing lesion on her right optic nerve.  Lab work up unremarkable.  She is currently using prednisone 50 mg daily.  Discussed option of continued slow taper of prednisone versus quick taper with repeating MRI after as her MRI showed cystic lesion in the perineural region of the orbit with an area of enhancement of the nerve posterior to this. Will continue slow taper of prednisone decreasing by 10 mg daily per week and repeat MRI in early November with follow up same day.   It is possible this is due to covid and will test covid antibodies.     She has a vesicular lesion on the tip of her nose.   I will have her use Zovrax 5% cream five times daily.             Again, thank you for allowing me to participate in the care of your patient.      Sincerely,    Saul Brumfield MD  Professor   Ophthalmology Residency   Director of Neuro-Ophthalmology  [employer] Endowed Chair  Departments of Ophthalmology, Neurology, and Neurosurgery  [facility] [address] [place]  [address]  T - [phone]  F - [phone]  ANTHONY Ortiz [email or web address]      CC: Mariana Carlson [facility]  [address] [place] [address]  VIA Facsimile: [phone]     Toby Castañeda MD  [facility]  [address]  [place] [address]  VIA Facsimile: [phone]

## 2020-09-25 ENCOUNTER — TELEPHONE (OUTPATIENT)
Dept: OPHTHALMOLOGY | Facility: CLINIC | Age: 60
End: 2020-09-25

## 2020-09-25 DIAGNOSIS — H53.40 VISUAL FIELD DEFECT: ICD-10-CM

## 2020-09-25 DIAGNOSIS — R23.8 VESICULAR LESION: Primary | ICD-10-CM

## 2020-09-25 RX ORDER — ACYCLOVIR 400 MG/1
400 TABLET ORAL 3 TIMES DAILY
Qty: 21 TABLET | Refills: 0 | Status: SHIPPED | OUTPATIENT
Start: 2020-09-25 | End: 2020-10-01

## 2020-09-25 NOTE — TELEPHONE ENCOUNTER
Pt to start acyclovir 400mg tablets-- one tab by mouth 3/day for 7 days per Dr. Brumfield    Rx sent and pt aware    Geoffrey Ariza RN 2:14 PM 09/25/20        Pt aware prior authorization in process for Zovirax    Pt inquiring if substitute available-- may be a week or more til hear from insurance, may be denied yet (currently 800 dollars out of pocket without insurance) and would like to start alternative sooner if able    Note to Dr. Brumfield/facilitator for assistance    Geoffrey Ariza RN 1:31 PM 09/25/20           Health Call Center    Phone Message    May a detailed message be left on voicemail: yes     Reason for Call: Medication Question or concern regarding medication   Prescription Clarification  Name of Medication: acyclovir (ZOVIRAX) 5 % external cream   Prescribing Provider: Dr Brumfield   Pharmacy: [id number] - [place], [place] - [address] AT [facility]    What on the order needs clarification? Pt said medication not on formulary needs a replacement medication that would be, Please call Pt for more information  Thank you,          Action Taken: Message routed to:  [facility] ([facility] eye    Travel Screening: Not Applicable

## 2020-09-25 NOTE — TELEPHONE ENCOUNTER
PA Initiation    Medication: acyclovir (ZOVIRAX) 5 % cream -   Insurance Company: [employer] - Phone [phone] Fax [phone]  Pharmacy Filling the Rx: [id number] - [place], [place] - [address] AT [facility]  Filling Pharmacy Phone: [phone]  Filling Pharmacy Fax: [phone]  Start Date: 9/25/2020

## 2020-09-28 NOTE — TELEPHONE ENCOUNTER
Prior Authorization Approval    Medication: acyclovir (ZOVIRAX) 5 % cream - APPROVED was approved on 9/25/2020  Effective: 9/25/2020 to 9/25/2021  Reference #:    Approved Dose/Quantity:   Insurance Company: [employer] - Phone [phone] Fax [phone]  Expected CoPay:    Pharmacy Filling the Rx: [id number] - [place], [place] - [address] AT [facility]  Pharmacy Notified: Yes  Patient Notified: Comment:  **Instructed pharmacy to notify patient when script is ready to /ship.**

## 2020-09-29 DIAGNOSIS — H53.40 VISUAL FIELD DEFECT: ICD-10-CM

## 2020-09-29 PROCEDURE — 86769 SARS-COV-2 COVID-19 ANTIBODY: CPT | Performed by: OPHTHALMOLOGY

## 2020-09-29 PROCEDURE — 36415 COLL VENOUS BLD VENIPUNCTURE: CPT | Performed by: OPHTHALMOLOGY

## 2020-09-30 LAB
COVID-19 ANTIBODY IGG: NEGATIVE
LAB TEST METHOD: NORMAL

## 2020-10-01 DIAGNOSIS — R23.8 VESICULAR LESION: ICD-10-CM

## 2020-10-01 RX ORDER — ACYCLOVIR 400 MG/1
400 TABLET ORAL 3 TIMES DAILY
Qty: 21 TABLET | Refills: 0 | Status: SHIPPED | OUTPATIENT
Start: 2020-10-01 | End: 2020-11-04

## 2020-10-01 RX ORDER — ACYCLOVIR 400 MG/1
400 TABLET ORAL 3 TIMES DAILY
Qty: 21 TABLET | Refills: 0 | Status: SHIPPED | OUTPATIENT
Start: 2020-10-01 | End: 2020-10-01

## 2020-10-01 NOTE — TELEPHONE ENCOUNTER
Patient left voicemail stating her nose is improving from the oral acyclovir.  Was given a one week supply, and was wondering if could get a refill.  The cream was approved but would still cost $200.00.  Dr. Brumfield was ok with refill.  Sent to her pharmacy.      Kristi Flores on 10/1/2020 at 10:57 AM

## 2020-10-07 ENCOUNTER — AMBULATORY - HEALTHEAST (OUTPATIENT)
Dept: VASCULAR SURGERY | Facility: CLINIC | Age: 60
End: 2020-10-07

## 2020-10-07 ENCOUNTER — COMMUNICATION - HEALTHEAST (OUTPATIENT)
Dept: VASCULAR SURGERY | Facility: CLINIC | Age: 60
End: 2020-10-07

## 2020-10-07 DIAGNOSIS — I89.0 ACQUIRED LYMPHEDEMA OF LEG: ICD-10-CM

## 2020-10-08 ENCOUNTER — RECORDS - HEALTHEAST (OUTPATIENT)
Dept: ADMINISTRATIVE | Facility: OTHER | Age: 60
End: 2020-10-08

## 2020-10-26 ENCOUNTER — DOCUMENTATION ONLY (OUTPATIENT)
Dept: CARE COORDINATION | Facility: CLINIC | Age: 60
End: 2020-10-26

## 2020-10-29 ENCOUNTER — OFFICE VISIT - HEALTHEAST (OUTPATIENT)
Dept: VASCULAR SURGERY | Facility: CLINIC | Age: 60
End: 2020-10-29

## 2020-10-29 DIAGNOSIS — C54.1 CANCER OF ENDOMETRIUM (H): ICD-10-CM

## 2020-10-29 DIAGNOSIS — C43.72 MALIGNANT MELANOMA OF LEFT LOWER EXTREMITY INCLUDING HIP (H): ICD-10-CM

## 2020-10-29 DIAGNOSIS — I89.0 ACQUIRED LYMPHEDEMA: ICD-10-CM

## 2020-10-29 DIAGNOSIS — I89.0 ACQUIRED LYMPHEDEMA OF LEG: ICD-10-CM

## 2020-10-29 DIAGNOSIS — R19.00 ABDOMINAL SWELLING: ICD-10-CM

## 2020-10-29 DIAGNOSIS — L90.5 SCAR CONDITION AND FIBROSIS OF SKIN: ICD-10-CM

## 2020-10-29 ASSESSMENT — MIFFLIN-ST. JEOR: SCORE: 1167.35

## 2020-11-04 ENCOUNTER — OFFICE VISIT (OUTPATIENT)
Dept: OPHTHALMOLOGY | Facility: CLINIC | Age: 60
End: 2020-11-04
Payer: COMMERCIAL

## 2020-11-04 ENCOUNTER — ANCILLARY PROCEDURE (OUTPATIENT)
Dept: MRI IMAGING | Facility: CLINIC | Age: 60
End: 2020-11-04
Attending: OPHTHALMOLOGY
Payer: COMMERCIAL

## 2020-11-04 DIAGNOSIS — H53.40 VISUAL FIELD DEFECT: ICD-10-CM

## 2020-11-04 DIAGNOSIS — H46.9 OPTIC NEURITIS: ICD-10-CM

## 2020-11-04 DIAGNOSIS — H53.40 VISUAL FIELD DEFECT: Primary | ICD-10-CM

## 2020-11-04 PROCEDURE — 70553 MRI BRAIN STEM W/O & W/DYE: CPT | Performed by: RADIOLOGY

## 2020-11-04 PROCEDURE — 92083 EXTENDED VISUAL FIELD XM: CPT | Performed by: OPHTHALMOLOGY

## 2020-11-04 PROCEDURE — A9585 GADOBUTROL INJECTION: HCPCS | Performed by: RADIOLOGY

## 2020-11-04 PROCEDURE — 70543 MRI ORBT/FAC/NCK W/O &W/DYE: CPT | Performed by: RADIOLOGY

## 2020-11-04 PROCEDURE — 92133 CPTRZD OPH DX IMG PST SGM ON: CPT | Performed by: OPHTHALMOLOGY

## 2020-11-04 PROCEDURE — 92012 INTRM OPH EXAM EST PATIENT: CPT | Mod: GC | Performed by: OPHTHALMOLOGY

## 2020-11-04 RX ORDER — ACETAMINOPHEN 500 MG
TABLET ORAL
COMMUNITY
Start: 2020-10-26 | End: 2021-01-01

## 2020-11-04 RX ORDER — GADOBUTROL 604.72 MG/ML
7.5 INJECTION INTRAVENOUS ONCE
Status: COMPLETED | OUTPATIENT
Start: 2020-11-04 | End: 2020-11-04

## 2020-11-04 RX ADMIN — GADOBUTROL 6 ML: 604.72 INJECTION INTRAVENOUS at 08:06

## 2020-11-04 ASSESSMENT — SLIT LAMP EXAM - LIDS
COMMENTS: NORMAL
COMMENTS: NORMAL

## 2020-11-04 ASSESSMENT — REFRACTION_WEARINGRX
SPECS_TYPE: PAL
OS_ADD: +2.25
OD_CYLINDER: +3.00
OD_SPHERE: -9.00
OD_ADD: +2.25
OD_AXIS: 076
OS_AXIS: 099
OS_SPHERE: -5.25
OS_CYLINDER: +0.25

## 2020-11-04 ASSESSMENT — TONOMETRY
OS_IOP_MMHG: 11
OD_IOP_MMHG: 13
IOP_METHOD: ICARE

## 2020-11-04 ASSESSMENT — VISUAL ACUITY
CORRECTION_TYPE: GLASSES
OD_CC+: -1
OS_CC: 20/20
OD_CC: 20/20
METHOD: SNELLEN - LINEAR

## 2020-11-04 ASSESSMENT — EXTERNAL EXAM - RIGHT EYE: OD_EXAM: NORMAL

## 2020-11-04 ASSESSMENT — EXTERNAL EXAM - LEFT EYE: OS_EXAM: NORMAL

## 2020-11-04 ASSESSMENT — CUP TO DISC RATIO
OD_RATIO: 0.3
OS_RATIO: 0.3

## 2020-11-04 NOTE — PROGRESS NOTES
Assessment & Plan     Alexa Kline is a 60 year old female with the following diagnoses:   1. Visual field defect    2. Optic neuritis       60 year old woman presented for follow up on right atypical optic neuritis . She had this ring enhancing lesion on her right optic nerve.  Lab work up unremarkable. She was last seen in 9/14/2020, back then we decided to taper prednisone slowly (10mg per week) and to repeat the MRI today. Currently she is taking prednisone 10mg for at least a couple of weeks    Lab work : AQP4, MOG, lyme, treponema, Anti-nuclear antibody, ace, cysticercosis all unremarkable.       Her visual acuity is 20/20 in both eye with no APD. Color plates are full. Altitudinal VF loss in the right eye is better today. OCT optic nerves showed RNFL superiorly in both eyes    I reviewed the MRI brain myself and I think the ring enhancement is better today, but it has not disappeared.    My impression is that she has atypical optic neuritis that responded to steroids. She is currently on prednisone 10 mg.  I will have her decrease prednisone to 5 mg a day for a week and then stop.  Follow up in 2 months with repeat MRI same day.  Follow up sooner as needed for worsening symptoms.  If she worsens and the MRI lesion worsens, then I think we may need to consider a biopsy.  The patient is in agreement with this plan.          Attending Physician Attestation:  Complete documentation of historical and exam elements from today's encounter can be found in the full encounter summary report (not reduplicated in this progress note).  I personally obtained the chief complaint(s) and history of present illness.  I confirmed and edited as necessary the review of systems, past medical/surgical history, family history, social history, and examination findings as documented by others; and I examined the patient myself.  I personally reviewed the relevant tests, images, and reports as documented above.  I formulated  and edited as necessary the assessment and plan and discussed the findings and management plan with the patient and family. I personally reviewed the ophthalmic test(s) associated with this encounter, agree with the interpretation(s) as documented by the resident/fellow, and have edited the corresponding report(s) as necessary.  - Saul Veloz MD  Neuro-ophthalmology fellow  [facility]

## 2020-11-04 NOTE — DISCHARGE INSTRUCTIONS
MRI Contrast Discharge Instructions    The IV contrast you received today will pass out of your body in your  urine. This will happen in the next 24 hours. You will not feel this process.  Your urine will not change color.    Drink at least 4 extra glasses of water or juice today (unless your doctor  has restricted your fluids). This reduces the stress on your kidneys.  You may take your regular medicines.    If you are on dialysis: It is best to have dialysis today.    If you have a reaction: Most reactions happen right away. If you have  any new symptoms after leaving the hospital (such as hives or swelling),  call your hospital at the correct number below. Or call your family doctor.  If you have breathing distress or wheezing, call 911.    Special instructions: ***    I have read and understand the above information.    Signature:______________________________________ Date:___________    Staff:__________________________________________ Date:___________     Time:__________    [facility] Departments:    ___[place]: [phone]  ___[facility]: [phone]  ___[place]: [phone] ___[facility]: [phone]  ___[facility]: [phone]  ___[facility]: [phone]  ___[phone]  ___[facility]: [phone]  ___[phone]

## 2020-11-04 NOTE — NURSING NOTE
Chief Complaints and History of Present Illnesses   Patient presents with     Optic Neuritis Follow Up     Chief Complaint(s) and History of Present Illness(es)     Optic Neuritis Follow Up     Laterality: right eye    Onset: months ago    Frequency: constantly    Course: gradually improving    Treatments tried: eye drops    Pain scale: 0/10              Comments     Follow up for Optic neuritis, MRI done today. Pt has noticed some improvement in the right eye, continues to see floater. Pt would like to discuss lesions on optic nerve. No pain. Occasional sharp pain in the left eye- 1-2x in the last week. Restasis BID each eye.    TRISTIAN Pat COT 9:09 AM November 4, 2020

## 2020-11-06 ENCOUNTER — TELEPHONE (OUTPATIENT)
Dept: OPHTHALMOLOGY | Facility: CLINIC | Age: 60
End: 2020-11-06

## 2020-11-06 DIAGNOSIS — R90.89 ABNORMAL FINDING ON MRI OF BRAIN: Primary | ICD-10-CM

## 2020-11-06 NOTE — TELEPHONE ENCOUNTER
RECORDS RECEIVED FROM: Internal   DATE RECEIVED: 11.13.2020   NOTES (Gather within 2 years) STATUS DETAILS   OFFICE NOTE from referring provider   Internal 11.06.2020 Saul Brumfield MD   OFFICE NOTE from other specialist N/A    DISCHARGE SUMMARY from hospital N/A    DISCHARGE REPORT from the ER N/A    LABS (any labs) Internal / CE    MEDICATION LIST Internal / CE    IMAGING  (NEED IMAGES AND REPORTS)     Osteomyelitis: Foot imaging  N/A    Liver Abscess: Abdominal imaging N/A    Other (anything related to diagnoses Internal 11.06.2020         Anesthesia Volume In Cc: 0

## 2020-11-09 ENCOUNTER — TELEPHONE (OUTPATIENT)
Dept: OPHTHALMOLOGY | Facility: CLINIC | Age: 60
End: 2020-11-09

## 2020-11-09 NOTE — TELEPHONE ENCOUNTER
FUTURE VISIT INFORMATION      FUTURE VISIT INFORMATION:    Date: 11/17/20    Time: 7:30am    Location: [facility]  REFERRAL INFORMATION:    Referring provider:  Dr. Brumfield    Referring providers clinic:  [facility]    Reason for visit/diagnosis  discuss RBAs of doing orbital lesion biopsy    RECORDS REQUESTED FROM:       Clinic name Comments Records Status Imaging Status   [facility] Ov/referral 11/4/20  OV/notes 9/11/20-11/6/20  MR Brain 11/4/202 [facility] PAC

## 2020-11-10 ENCOUNTER — OFFICE VISIT (OUTPATIENT)
Dept: OPHTHALMOLOGY | Facility: CLINIC | Age: 60
End: 2020-11-10
Payer: COMMERCIAL

## 2020-11-10 DIAGNOSIS — H05.89 ORBITAL LESION: Primary | ICD-10-CM

## 2020-11-10 PROBLEM — M25.475 SWELLING OF FOOT JOINT, LEFT: Status: ACTIVE | Noted: 2017-09-27

## 2020-11-10 PROBLEM — H53.8 BLURRING OF VISUAL IMAGE: Status: ACTIVE | Noted: 2020-09-01

## 2020-11-10 PROBLEM — S92.255D CLOSED NONDISPLACED FRACTURE OF NAVICULAR BONE OF LEFT FOOT WITH ROUTINE HEALING: Status: ACTIVE | Noted: 2017-09-27

## 2020-11-10 PROBLEM — H53.9 VISUAL DISTURBANCE: Status: ACTIVE | Noted: 2020-09-01

## 2020-11-10 PROBLEM — C43.72 MALIGNANT MELANOMA OF LEFT LOWER EXTREMITY INCLUDING HIP (H): Status: ACTIVE | Noted: 2018-04-18

## 2020-11-10 PROBLEM — H43.391 VITREOUS FLOATER, RIGHT: Status: ACTIVE | Noted: 2020-09-01

## 2020-11-10 PROCEDURE — 99213 OFFICE O/P EST LOW 20 MIN: CPT | Performed by: OPHTHALMOLOGY

## 2020-11-10 RX ORDER — PREDNISONE 5 MG/1
5 TABLET ORAL DAILY
COMMUNITY
End: 2021-01-01

## 2020-11-10 ASSESSMENT — EXTERNAL EXAM - RIGHT EYE: OD_EXAM: NORMAL

## 2020-11-10 ASSESSMENT — VISUAL ACUITY
CORRECTION_TYPE: GLASSES
METHOD: SNELLEN - LINEAR
OD_CC: 20/20
OS_CC: 20/20
OS_CC+: -1

## 2020-11-10 ASSESSMENT — CONF VISUAL FIELD
METHOD: COUNTING FINGERS
OD_NORMAL: 1
OS_NORMAL: 1

## 2020-11-10 ASSESSMENT — SLIT LAMP EXAM - LIDS
COMMENTS: NORMAL
COMMENTS: NORMAL

## 2020-11-10 ASSESSMENT — TONOMETRY
OS_IOP_MMHG: 11
IOP_METHOD: ICARE
OD_IOP_MMHG: 12

## 2020-11-10 ASSESSMENT — EXTERNAL EXAM - LEFT EYE: OS_EXAM: NORMAL

## 2020-11-10 NOTE — LETTER
11/10/2020         RE:  :  MRN: Alexa Kline  1960  [id number]     Dear Dr. Saul Brumfield,    Your patient, Alexa Kline, returned for oculoplastic follow up. My assessment and plan are below.  For further details, please see my attached clinic note.      Chief Complaint(s) and History of Present Illness(es)     Consult For     Laterality: both eyes    Quality: missing vision and blurred vision    Associated symptoms: Negative for eye pain, headache, flashes and   floaters    Pain scale: 0/10              Comments     Alexa Kline is being seen for a consult today by the request of Dr. Brumfield for Orbital lesion bx.    Jessica Ry LUBIN November 10, 2020 7:26 AM       The patient states that she first noticed right eye paracentral scotoma (lower 1/3 of her vision), which she describes as an area of foggy vision, around the end of August. She notes that the scotoma progressively enlarged, until it covered 3/4 of her vision in that eye. She saw Dr. Brumfield and was placed on a steroid therapy. She is currently on a prednisone taper, on 5 mg per day (last day of prednisone therapy tomorrow). She reports persistent but improved paracentral scotoma (lower 1/4 of her vision at this time). She denies eye pain, redness, swelling, flashes, and floaters. She denies left eye symptoms. She denies recent fevers, HA, nausea/vomiting.    Of note, patient notes she lost 10 pounds in . She notes that she had constant diarrhea in , which was self-limited. She notes she was reading about cyclosporiasis and believes she may have been eating salads that were implicated in a recent outbreak. She states she was tested for cyclosporiasis in , which was negative.    Assessment & Plan     Alexa Kline is a 60 year old female with the following diagnoses:     Encounter Diagnosis   Name Primary?     Orbital lesion - Right Eye Yes     -Continue prednisone taper, currently 5 mg with stop date tomorrow  -Patient  interested in possible biopsy of orbital lesion. R/B/A of biopsy discussed with the patient.    Hilaria Christina MD  Ophthalmology Resident, PGY-2  Pager: [phone]    Discussed her current situation with patient and her , Nahum. We discussed risks, benefits and alternatives to biopsy of her optic nerve sheath. I discussed risks include risks of general anesthesia, as well as infection, bleeding, loss of vision, change in pupil size, double vision, and loss of the eye. I told her risk of blindness based on the literature for optic nerve sheath fenestration is about 4-12% but given possibility of the sheath or mass being scarred to the nerve the risk is probably higher.     Given her improvement on prednisone, at this time we will wait on biopsy, she has a follow up MRI scheduled in two months. If she notices worsening in the interim, we can obtain an MRI sooner and arrange a biopsy. She was happy with this plan. I have not scheduled follow up with me, and will base it on recurrence of symptoms and her follow up MRI.     Attending Physician Attestation: Complete documentation of historical and exam elements from today's encounter can be found in the full encounter summary report (not reduplicated in this progress note). I personally obtained the chief complaint(s) and history of present illness. I confirmed and edited as necessary the review of systems, past medical/surgical history, family history, social history, and examination findings as documented by others; and I examined the patient myself. I personally reviewed the relevant tests, images, and reports as documented above. I formulated and edited as necessary the assessment and plan and discussed the findings and management plan with the patient and family.   -Morelia Matos MD            Again, thank you for allowing me to participate in the care of your patient.      Sincerely,    Morelia Matos MD  Department of Ophthalmology and Visual  Neurosciences  [facility]      CC: Saul Brumfield MD  [address] [address]  Via In Basket

## 2020-11-10 NOTE — PROGRESS NOTES
Chief Complaint(s) and History of Present Illness(es)     Consult For     Laterality: both eyes    Quality: missing vision and blurred vision    Associated symptoms: Negative for eye pain, headache, flashes and   floaters    Pain scale: 0/10              Comments     Alexa Kline is being seen for a consult today by the request of Dr. Brumfield for Orbital lesion bx.    Jessica Raza TRISTIAN November 10, 2020 7:26 AM       The patient states that she first noticed right eye paracentral scotoma (lower 1/3 of her vision), which she describes as an area of foggy vision, around the end of August. She notes that the scotoma progressively enlarged, until it covered 3/4 of her vision in that eye. She saw Dr. Brumfield and was placed on a steroid therapy. She is currently on a prednisone taper, on 5 mg per day (last day of prednisone therapy tomorrow). She reports persistent but improved paracentral scotoma (lower 1/4 of her vision at this time). She denies eye pain, redness, swelling, flashes, and floaters. She denies left eye symptoms. She denies recent fevers, HA, nausea/vomiting.    Of note, patient notes she lost 10 pounds in June. She notes that she had constant diarrhea in June, which was self-limited. She notes she was reading about cyclosporiasis and believes she may have been eating salads that were implicated in a recent outbreak. She states she was tested for cyclosporiasis in June, which was negative.    Assessment & Plan     Alexa Kline is a 60 year old female with the following diagnoses:     Encounter Diagnosis   Name Primary?     Orbital lesion - Right Eye Yes     -Continue prednisone taper, currently 5 mg with stop date tomorrow  -Patient interested in possible biopsy of orbital lesion. R/B/A of biopsy discussed with the patient.    Hilaria Christina MD  Ophthalmology Resident, PGY-2  Pager: [phone]    Discussed her current situation with patient and her , Nahum. We discussed risks, benefits and  alternatives to biopsy of her optic nerve sheath. I discussed risks include risks of general anesthesia, as well as infection, bleeding, loss of vision, change in pupil size, double vision, and loss of the eye. I told her risk of blindness based on the literature for optic nerve sheath fenestration is about 4-12% but given possibility of the sheath or mass being scarred to the nerve the risk is probably higher.     Given her improvement on prednisone, at this time we will wait on biopsy, she has a follow up MRI scheduled in two months. If she notices worsening in the interim, we can obtain an MRI sooner and arrange a biopsy. She was happy with this plan. I have not scheduled follow up with me, and will base it on recurrence of symptoms and her follow up MRI.     Attending Physician Attestation: Complete documentation of historical and exam elements from today's encounter can be found in the full encounter summary report (not reduplicated in this progress note). I personally obtained the chief complaint(s) and history of present illness. I confirmed and edited as necessary the review of systems, past medical/surgical history, family history, social history, and examination findings as documented by others; and I examined the patient myself. I personally reviewed the relevant tests, images, and reports as documented above. I formulated and edited as necessary the assessment and plan and discussed the findings and management plan with the patient and family.   -Morelia Matos MD

## 2020-11-10 NOTE — NURSING NOTE
Chief Complaints and History of Present Illnesses   Patient presents with     Consult For     Chief Complaint(s) and History of Present Illness(es)     Consult For     Laterality: both eyes    Quality: missing vision and blurred vision    Associated symptoms: Negative for eye pain, headache, flashes and floaters    Pain scale: 0/10              Comments     Alexa Kline is being seen for a consult today by the request of Dr. Brumfield for Orbital lesion bx.    Jessica Raza COT November 10, 2020 7:26 AM

## 2020-11-13 ENCOUNTER — VIRTUAL VISIT (OUTPATIENT)
Dept: INFECTIOUS DISEASES | Facility: CLINIC | Age: 60
End: 2020-11-13
Attending: INTERNAL MEDICINE
Payer: COMMERCIAL

## 2020-11-13 ENCOUNTER — PRE VISIT (OUTPATIENT)
Dept: INFECTIOUS DISEASES | Facility: CLINIC | Age: 60
End: 2020-11-13

## 2020-11-13 DIAGNOSIS — R90.89 ABNORMAL FINDING ON MRI OF BRAIN: ICD-10-CM

## 2020-11-13 PROCEDURE — 99204 OFFICE O/P NEW MOD 45 MIN: CPT | Mod: 95 | Performed by: INTERNAL MEDICINE

## 2020-11-13 RX ORDER — COVID-19 ANTIGEN TEST
220 KIT MISCELLANEOUS 2 TIMES DAILY WITH MEALS
COMMUNITY
End: 2021-01-01

## 2020-11-13 ASSESSMENT — PAIN SCALES - GENERAL: PAINLEVEL: NO PAIN (0)

## 2020-11-13 NOTE — LETTER
"11/13/2020       RE: Alexa Kline  [address] [facility] [address]     Dear Colleague,    Thank you for referring your patient, Alexa Kline, to the [facility] [place] at [facility]. Please see a copy of my visit note below.    Alexa Kline is a 60 year old female who is being evaluated via a billable video visit.      The patient has been notified of following:     \"This video visit will be conducted via a call between you and your physician/provider. We have found that certain health care needs can be provided without the need for an in-person physical exam.  This service lets us provide the care you need with a video conversation.  If a prescription is necessary we can send it directly to your pharmacy.  If lab work is needed we can place an order for that and you can then stop by our lab to have the test done at a later time.    Video visits are billed at different rates depending on your insurance coverage.  Please reach out to your insurance provider with any questions.    If during the course of the call the physician/provider feels a video visit is not appropriate, you will not be charged for this service.\"    Patient has given verbal consent for Video visit? Yes  How would you like to obtain your AVS? MyChart  If you are dropped from the video visit, the video invite should be resent to: Text to cell phone: [phone]  Will anyone else be joining your video visit? No      Video-Visit Details  Type of service:  Video Visit  Video Start Time: 9.36 am   Video End Time: 10.16 am     Originating Location (pt. Location): Home    Distant Location (provider location):  [facility] [place]     Platform used for Video Visit: [facility]    INFECTIOUS DISEASES CONSULTATION  NOTE  Consult requested by: Dr Brumfield    Reason for Consult : ring enhancing lesion of optic nerve ? 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     No fever, chills, night sweats, headaches. She admits that the visual loss could have progressed gradually since June-July 2020. MRI brain and orbit on 9/11/20 showed \"abnormal signal and diffusion signal abnormality, enlargement and enhancement involving the intraorbital segment of the right optic nerve.diagnostic considerations include acute optic neuritis, ischemic optic neuropathy, or less likely primary optic nerve neoplasm\". She saw Dr Brumfield on 9/14/20 and diagnosed with Optic neuritis and started her on prednisone taper ( prednisone 1250 mg daily for 3 days and then prednisone 60 mg for a week, then 50 mg a day for a week, 40 mg a day for a week, 30 mg a day for a week, 20 mg a day for a week, 10 mg a day for a week). Her vision has improved with prednisone and a repeat MRI on 11/4/20 showed \"stable size of peripherally enhancing cystic lesion of the right optic nerve with decreasing enhancement since 9/11/2020. This lesion is indeterminate and differentials include but not limited to optic sheath arachnoid cyst, evolving infarct, parasitic infection. Regarding the remainder of the brain, no abnormalities are demonstrated\" her vision continues to improve .Per exam on 11/4/20 - visual acuity was 20/20 in both eye with no APD. Color plates were full. Altitudinal VF loss in the right eye was better t. OCT optic nerves showed RNFL superiorly in both eyes.     She saw Dr Matos on 11/10/20 for possible orbital lesion biosy but since her vision has improved on prednisone, the plan is to not do biopsy and await repeat " MRI in 2 months.       Lab work : lyme, treponema, Anti-nuclear antibody, ace - negative and stool O+P neg x 3 , CRP <0.02 and ESR 6 on 9/10/20         Her other concern is that she had on-off diarrhea in June 2020. She is concerned about possible Cyclosporiasis She was having lots of salad at that time. Symptoms resolved on its own. stool OP tests were   negative x 3 and COVID19 PCR Oropharyngeal was negative on 6/26/20 and COVID19 antibody was negative on 9/29/20 .       She is feeling fine now, active with daily activities, good energy level. No recent travels . last international travel was to [place] in May 2019. She used to bike in the city otherwise not much outdoor activities.      Imaging:  MRI brain and orbit 11/4/20  Impression:    1. Stable size of peripherally enhancing cystic lesion of the right optic nerve with decreasing enhancement since 9/11/2020. This lesion  is indeterminate and differentials include but not limited to optic sheath arachnoid cyst, evolving infarct, parasitic infection   2. Regarding the remainder of the brain, no abnormalities are demonstrated    Carotid US 9/11/20  IMPRESSION:  1.  Mild plaque formation, velocities consistent with less than 50% stenosis in the right internal carotid artery.  2.  Mild plaque formation, velocities consistent with less than 50% stenosis in the left internal carotid artery.  3.  Flow within the vertebral arteries is antegrade.    MRI brain and orbit 9/11/20  1.  Abnormal signal and diffusion signal abnormality, enlargement and enhancement involving the intraorbital segment of the right optic nerve.  Diagnostic considerations include acute optic neuritis, ischemic optic neuropathy, or less likely primary optic nerve neoplasm.  2.  No evidence of acute intracranial hemorrhage, mass effect, or infarction.    ROS:  CONSTITUTIONAL:NEGATIVE for fever, chills, change in weight  INTEGUMENTARY/SKIN: NEGATIVE for worrisome rashes, moles or lesions  EYES:  NEGATIVE for vision changes or irritation  ENT/MOUTH: NEGATIVE for ear, mouth and throat problems  RESP:NEGATIVE for significant cough or SOB  CV: NEGATIVE for chest pain, palpitations or peripheral edema  GI: see HPI   : NEGATIVE for urinary frequency, hematuria or dysuria  MUSCULOSKELETAL: NEGATIVE for significant arthralgias or myalgia  NEURO: see HPI   ENDOCRINE: NEGATIVE for temperature intolerance, skin/hair changes  HEME/ALLERGY/IMMUNE: NEGATIVE for bleeding problems  PSYCHIATRIC: NEGATIVE for changes in mood or affect    Problem list and histories reviewed & adjusted, as indicated.  Additional history: as documented    PAST MEDICAL HISTORY:  hyperlipidemia  bilateral carotid artery stenosis   epilepsy secondary to hyponatremia     PAST SURGICAL HISTORY:  Patient  has a past surgical history that includes Repair ptosis (Bilateral).    CURRENT MEDICATIONS:  Current Outpatient Medications   Medication Sig Dispense Refill     acetaminophen (TYLENOL) 500 MG tablet        ALBUTEROL IN Inhale 90 mcg into the lungs once as needed       biotin 1000 MCG TABS tablet Take 2,500 mcg by mouth daily       calcium 600-200 MG-UNIT TABS Take 1 tablet by mouth       cetirizine (ZYRTEC) 10 MG tablet Take 10 mg by mouth daily       cycloSPORINE (RESTASIS) 0.05 % ophthalmic emulsion Place 1 drop into both eyes 2 times daily       linaclotide (LINZESS) 145 MCG capsule Take 1 capsule by mouth every morning (before breakfast)       Melatonin-Pyridoxine (MELATONEX PO) Take 10 mg by mouth At Bedtime       mometasone (NASONEX) 50 MCG/ACT nasal spray Spray 1 spray into both nostrils 2 times daily       Multiple Vitamins-Minerals (MULTIVITAMIN ADULT PO) Take 1 tablet by mouth daily Multivitamin with Iron       naproxen sodium 220 MG capsule Take 220 mg by mouth 2 times daily (with meals)       Omega-3 Fatty Acids (FISH OIL) 1200 MG capsule Take 1,200 mg by mouth daily       predniSONE (DELTASONE) 5 MG tablet Take 5 mg by mouth daily        Vitamin D, Cholecalciferol, 25 MCG (1000 UT) TABS Take 50 mcg by mouth daily       ALLERGY:  No Known Allergies    IMMUNIZATION HISTORY:  Immunization History   Administered Date(s) Administered     Influenza (H1N1) 12/07/2009     Influenza Vaccine IM > 6 months Valent IIV4 09/21/2016, 09/19/2017, 09/22/2018, 10/05/2019, 09/10/2020     Pneumo Conj 13-V (2010&after) 10/22/2003, 09/20/2010     Tdap (Adult) Unspecified Formulation 12/05/2011     Zoster vaccine recombinant adjuvanted (SHINGRIX) 08/31/2018, 12/13/2018     Zoster vaccine, live 06/06/2012     SOCIAL HISTORY:  Patient  reports that she has never smoked. She has never used smokeless tobacco. occasional alcohol   , occupation : / system [employer]     FAMILY HISTORY:  not contributory     Labs reviewed in EPIC    OBJECTIVE:                                                    GENERAL: healthy, alert, oriented and no distress  EYES: Eyes grossly normal to inspection, and conjunctivae and sclerae normal  NECK: supple   RESP: no dyspnea, speaks full sentences  MS: no gross musculoskeletal defects noted, no edema  SKIN: no suspicious lesions or rashes  NEURO: mentation intact and speech normal  PSYCH: mentation appears normal, affect normal/bright       ASSESSMENT AND PLAN:                                                      1. Right eye visual defect likely optic neuritis   - vision has improved with prednisone taper   - doubt lesion is due to infection with no fever, normal CRP and ESR, WBC , negative O+P x3 and negative COVID19 PCR and antibody   - agree with repeating MRI brain/orbit     Thank You for allowing me to participate in the care of this patient    Janak Curry MD, M.Med.Sc  Division of Infectious Diseases and International Medicine  [facility]

## 2020-11-13 NOTE — PROGRESS NOTES
"Alexa Kline is a 60 year old female who is being evaluated via a billable video visit.      The patient has been notified of following:     \"This video visit will be conducted via a call between you and your physician/provider. We have found that certain health care needs can be provided without the need for an in-person physical exam.  This service lets us provide the care you need with a video conversation.  If a prescription is necessary we can send it directly to your pharmacy.  If lab work is needed we can place an order for that and you can then stop by our lab to have the test done at a later time.    Video visits are billed at different rates depending on your insurance coverage.  Please reach out to your insurance provider with any questions.    If during the course of the call the physician/provider feels a video visit is not appropriate, you will not be charged for this service.\"    Patient has given verbal consent for Video visit? Yes  How would you like to obtain your AVS? MyChart  If you are dropped from the video visit, the video invite should be resent to: Text to cell phone: [phone]  Will anyone else be joining your video visit? No      Video-Visit Details  Type of service:  Video Visit  Video Start Time: 9.36 am   Video End Time: 10.16 am     Originating Location (pt. Location): Home    Distant Location (provider location):  [facility] INF[facility] DISEASE CLINIC [place]     Platform used for Video Visit: [employer]    INFECTIOUS DISEASES CONSULTATION  NOTE  Consult requested by: Dr Brumfield    Reason for Consult : ring enhancing lesion of optic nerve ? 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     No fever, chills, night sweats, headaches. She admits that the visual loss could have progressed gradually since June-July 2020. MRI brain and orbit on 9/11/20 showed \"abnormal signal and diffusion signal abnormality, enlargement and enhancement involving the intraorbital segment of the right optic nerve.diagnostic considerations include acute optic neuritis, ischemic optic neuropathy, or less likely primary optic nerve neoplasm\". She saw Dr Brumfield on 9/14/20 and diagnosed with Optic neuritis and started her on prednisone taper ( prednisone 1250 mg daily for 3 days and then prednisone 60 mg for a week, then 50 mg a day for a week, 40 mg a day for a week, 30 mg a day for a week, 20 mg a day for a week, 10 mg a day for a week). Her vision has improved with prednisone and a repeat MRI on 11/4/20 showed \"stable size of peripherally enhancing cystic lesion of the right optic nerve with decreasing enhancement since 9/11/2020. This lesion is indeterminate and differentials include but not limited to optic sheath arachnoid cyst, evolving infarct, parasitic infection. Regarding the remainder of the brain, no abnormalities are demonstrated\" her vision continues to improve .Per exam on 11/4/20 - visual acuity was 20/20 in both eye with no APD. Color plates were full. Altitudinal VF loss in the right eye was better t. OCT optic nerves showed RNFL superiorly in both eyes.     She saw Dr Matos on 11/10/20 for possible orbital lesion biosy but since her vision has improved on prednisone, the plan is to not do biopsy and await repeat MRI in 2 months.       Lab work : lyme, treponema, Anti-nuclear antibody, ace - negative and stool O+P neg x 3 , CRP <0.02 and ESR 6 on 9/10/20         Her other concern is that she had on-off diarrhea in June 2020. She is concerned about possible Cyclosporiasis She was having lots of salad at that time. Symptoms " resolved on its own. stool OP tests were   negative x 3 and COVID19 PCR Oropharyngeal was negative on 6/26/20 and COVID19 antibody was negative on 9/29/20 .       She is feeling fine now, active with daily activities, good energy level. No recent travels . last international travel was to [place] in May 2019. She used to bike in the city otherwise not much outdoor activities.      Imaging:  MRI brain and orbit 11/4/20  Impression:    1. Stable size of peripherally enhancing cystic lesion of the right optic nerve with decreasing enhancement since 9/11/2020. This lesion  is indeterminate and differentials include but not limited to optic sheath arachnoid cyst, evolving infarct, parasitic infection   2. Regarding the remainder of the brain, no abnormalities are demonstrated    Carotid US 9/11/20  IMPRESSION:  1.  Mild plaque formation, velocities consistent with less than 50% stenosis in the right internal carotid artery.  2.  Mild plaque formation, velocities consistent with less than 50% stenosis in the left internal carotid artery.  3.  Flow within the vertebral arteries is antegrade.    MRI brain and orbit 9/11/20  1.  Abnormal signal and diffusion signal abnormality, enlargement and enhancement involving the intraorbital segment of the right optic nerve.  Diagnostic considerations include acute optic neuritis, ischemic optic neuropathy, or less likely primary optic nerve neoplasm.  2.  No evidence of acute intracranial hemorrhage, mass effect, or infarction.    ROS:  CONSTITUTIONAL:NEGATIVE for fever, chills, change in weight  INTEGUMENTARY/SKIN: NEGATIVE for worrisome rashes, moles or lesions  EYES: NEGATIVE for vision changes or irritation  ENT/MOUTH: NEGATIVE for ear, mouth and throat problems  RESP:NEGATIVE for significant cough or SOB  CV: NEGATIVE for chest pain, palpitations or peripheral edema  GI: see HPI   : NEGATIVE for urinary frequency, hematuria or dysuria  MUSCULOSKELETAL: NEGATIVE for significant  arthralgias or myalgia  NEURO: see HPI   ENDOCRINE: NEGATIVE for temperature intolerance, skin/hair changes  HEME/ALLERGY/IMMUNE: NEGATIVE for bleeding problems  PSYCHIATRIC: NEGATIVE for changes in mood or affect    Problem list and histories reviewed & adjusted, as indicated.  Additional history: as documented    PAST MEDICAL HISTORY:  hyperlipidemia  bilateral carotid artery stenosis   epilepsy secondary to hyponatremia     PAST SURGICAL HISTORY:  Patient  has a past surgical history that includes Repair ptosis (Bilateral).    CURRENT MEDICATIONS:  Current Outpatient Medications   Medication Sig Dispense Refill     acetaminophen (TYLENOL) 500 MG tablet        ALBUTEROL IN Inhale 90 mcg into the lungs once as needed       biotin 1000 MCG TABS tablet Take 2,500 mcg by mouth daily       calcium 600-200 MG-UNIT TABS Take 1 tablet by mouth       cetirizine (ZYRTEC) 10 MG tablet Take 10 mg by mouth daily       cycloSPORINE (RESTASIS) 0.05 % ophthalmic emulsion Place 1 drop into both eyes 2 times daily       linaclotide (LINZESS) 145 MCG capsule Take 1 capsule by mouth every morning (before breakfast)       Melatonin-Pyridoxine (MELATONEX PO) Take 10 mg by mouth At Bedtime       mometasone (NASONEX) 50 MCG/ACT nasal spray Spray 1 spray into both nostrils 2 times daily       Multiple Vitamins-Minerals (MULTIVITAMIN ADULT PO) Take 1 tablet by mouth daily Multivitamin with Iron       naproxen sodium 220 MG capsule Take 220 mg by mouth 2 times daily (with meals)       Omega-3 Fatty Acids (FISH OIL) 1200 MG capsule Take 1,200 mg by mouth daily       predniSONE (DELTASONE) 5 MG tablet Take 5 mg by mouth daily       Vitamin D, Cholecalciferol, 25 MCG (1000 UT) TABS Take 50 mcg by mouth daily       ALLERGY:  No Known Allergies    IMMUNIZATION HISTORY:  Immunization History   Administered Date(s) Administered     Influenza (H1N1) 12/07/2009     Influenza Vaccine IM > 6 months Valent IIV4 09/21/2016, 09/19/2017, 09/22/2018,  10/05/2019, 09/10/2020     Pneumo Conj 13-V (2010&after) 10/22/2003, 09/20/2010     Tdap (Adult) Unspecified Formulation 12/05/2011     Zoster vaccine recombinant adjuvanted (SHINGRIX) 08/31/2018, 12/13/2018     Zoster vaccine, live 06/06/2012     SOCIAL HISTORY:  Patient  reports that she has never smoked. She has never used smokeless tobacco. occasional alcohol   , occupation : / system [employer]     FAMILY HISTORY:  not contributory     Labs reviewed in EPIC    OBJECTIVE:                                                    GENERAL: healthy, alert, oriented and no distress  EYES: Eyes grossly normal to inspection, and conjunctivae and sclerae normal  NECK: supple   RESP: no dyspnea, speaks full sentences  MS: no gross musculoskeletal defects noted, no edema  SKIN: no suspicious lesions or rashes  NEURO: mentation intact and speech normal  PSYCH: mentation appears normal, affect normal/bright       ASSESSMENT AND PLAN:                                                      1. Right eye visual defect likely optic neuritis   - vision has improved with prednisone taper   - doubt lesion is due to infection with no fever, normal CRP and ESR, WBC , negative O+P x3 and negative COVID19 PCR and antibody   - agree with repeating MRI brain/orbit     Thank You for allowing me to participate in the care of this patient    Janak Curry MD, M.Med.Sc  Division of Infectious Diseases and International Medicine  [facility]

## 2020-11-17 ENCOUNTER — PRE VISIT (OUTPATIENT)
Dept: OPHTHALMOLOGY | Facility: CLINIC | Age: 60
End: 2020-11-17

## 2021-01-01 ENCOUNTER — OFFICE VISIT (OUTPATIENT)
Dept: OPHTHALMOLOGY | Facility: CLINIC | Age: 61
End: 2021-01-01
Attending: OPHTHALMOLOGY
Payer: COMMERCIAL

## 2021-01-01 ENCOUNTER — HOSPITAL ENCOUNTER (OUTPATIENT)
Dept: BEHAVIORAL HEALTH | Facility: CLINIC | Age: 61
End: 2021-10-28
Attending: PSYCHIATRY & NEUROLOGY
Payer: COMMERCIAL

## 2021-01-01 ENCOUNTER — HOSPITAL ENCOUNTER (OUTPATIENT)
Dept: BEHAVIORAL HEALTH | Facility: CLINIC | Age: 61
End: 2021-11-17
Attending: PSYCHIATRY & NEUROLOGY
Payer: COMMERCIAL

## 2021-01-01 ENCOUNTER — MYC MEDICAL ADVICE (OUTPATIENT)
Dept: RHEUMATOLOGY | Facility: CLINIC | Age: 61
End: 2021-01-01

## 2021-01-01 ENCOUNTER — HOSPITAL ENCOUNTER (OUTPATIENT)
Dept: BEHAVIORAL HEALTH | Facility: CLINIC | Age: 61
Discharge: HOME | End: 2021-10-20
Attending: FAMILY MEDICINE | Admitting: FAMILY MEDICINE
Payer: COMMERCIAL

## 2021-01-01 ENCOUNTER — HOSPITAL ENCOUNTER (OUTPATIENT)
Dept: BEHAVIORAL HEALTH | Facility: CLINIC | Age: 61
End: 2021-10-25
Attending: PSYCHIATRY & NEUROLOGY
Payer: COMMERCIAL

## 2021-01-01 ENCOUNTER — OFFICE VISIT (OUTPATIENT)
Dept: OPHTHALMOLOGY | Facility: CLINIC | Age: 61
End: 2021-01-01
Payer: COMMERCIAL

## 2021-01-01 ENCOUNTER — HOSPITAL ENCOUNTER (OUTPATIENT)
Dept: BEHAVIORAL HEALTH | Facility: CLINIC | Age: 61
End: 2021-11-22
Attending: PSYCHIATRY & NEUROLOGY
Payer: COMMERCIAL

## 2021-01-01 ENCOUNTER — HOSPITAL ENCOUNTER (OUTPATIENT)
Dept: BEHAVIORAL HEALTH | Facility: CLINIC | Age: 61
End: 2021-11-01
Attending: PSYCHIATRY & NEUROLOGY
Payer: COMMERCIAL

## 2021-01-01 ENCOUNTER — HOSPITAL ENCOUNTER (OUTPATIENT)
Dept: BEHAVIORAL HEALTH | Facility: CLINIC | Age: 61
End: 2021-11-11
Attending: PSYCHIATRY & NEUROLOGY
Payer: COMMERCIAL

## 2021-01-01 ENCOUNTER — TELEPHONE (OUTPATIENT)
Dept: OPHTHALMOLOGY | Facility: CLINIC | Age: 61
End: 2021-01-01

## 2021-01-01 ENCOUNTER — VIRTUAL VISIT (OUTPATIENT)
Dept: RHEUMATOLOGY | Facility: CLINIC | Age: 61
End: 2021-01-01
Attending: OPHTHALMOLOGY
Payer: COMMERCIAL

## 2021-01-01 ENCOUNTER — HOSPITAL ENCOUNTER (OUTPATIENT)
Dept: BEHAVIORAL HEALTH | Facility: CLINIC | Age: 61
End: 2021-11-08
Attending: PSYCHIATRY & NEUROLOGY
Payer: COMMERCIAL

## 2021-01-01 ENCOUNTER — HOSPITAL ENCOUNTER (OUTPATIENT)
Dept: BEHAVIORAL HEALTH | Facility: CLINIC | Age: 61
End: 2021-11-04
Attending: PSYCHIATRY & NEUROLOGY
Payer: COMMERCIAL

## 2021-01-01 ENCOUNTER — HOSPITAL ENCOUNTER (OUTPATIENT)
Dept: BEHAVIORAL HEALTH | Facility: CLINIC | Age: 61
End: 2021-11-03
Attending: PSYCHIATRY & NEUROLOGY
Payer: COMMERCIAL

## 2021-01-01 ENCOUNTER — HOSPITAL ENCOUNTER (OUTPATIENT)
Dept: BEHAVIORAL HEALTH | Facility: CLINIC | Age: 61
End: 2021-10-27
Attending: PSYCHIATRY & NEUROLOGY
Payer: COMMERCIAL

## 2021-01-01 ENCOUNTER — TELEPHONE (OUTPATIENT)
Dept: BEHAVIORAL HEALTH | Facility: CLINIC | Age: 61
End: 2021-01-01

## 2021-01-01 ENCOUNTER — VIRTUAL VISIT (OUTPATIENT)
Dept: RHEUMATOLOGY | Facility: CLINIC | Age: 61
End: 2021-01-01
Attending: INTERNAL MEDICINE
Payer: COMMERCIAL

## 2021-01-01 ENCOUNTER — HEALTH MAINTENANCE LETTER (OUTPATIENT)
Age: 61
End: 2021-01-01

## 2021-01-01 ENCOUNTER — ANESTHESIA (OUTPATIENT)
Dept: SURGERY | Facility: AMBULATORY SURGERY CENTER | Age: 61
End: 2021-01-01

## 2021-01-01 ENCOUNTER — LAB (OUTPATIENT)
Dept: LAB | Facility: CLINIC | Age: 61
End: 2021-01-01
Payer: COMMERCIAL

## 2021-01-01 ENCOUNTER — HOSPITAL ENCOUNTER (EMERGENCY)
Facility: CLINIC | Age: 61
Discharge: HOME | End: 2021-02-13
Attending: FAMILY MEDICINE | Admitting: FAMILY MEDICINE
Payer: COMMERCIAL

## 2021-01-01 ENCOUNTER — HOSPITAL ENCOUNTER (OUTPATIENT)
Dept: BEHAVIORAL HEALTH | Facility: CLINIC | Age: 61
End: 2021-11-10
Attending: PSYCHIATRY & NEUROLOGY
Payer: COMMERCIAL

## 2021-01-01 ENCOUNTER — HOSPITAL ENCOUNTER (OUTPATIENT)
Dept: BEHAVIORAL HEALTH | Facility: CLINIC | Age: 61
End: 2021-11-15
Attending: PSYCHIATRY & NEUROLOGY
Payer: COMMERCIAL

## 2021-01-01 ENCOUNTER — HOSPITAL ENCOUNTER (OUTPATIENT)
Facility: AMBULATORY SURGERY CENTER | Age: 61
Discharge: HOME | End: 2021-02-25
Attending: OPHTHALMOLOGY | Admitting: OPHTHALMOLOGY
Payer: COMMERCIAL

## 2021-01-01 ENCOUNTER — ANCILLARY PROCEDURE (OUTPATIENT)
Dept: MRI IMAGING | Facility: CLINIC | Age: 61
End: 2021-01-01
Attending: OPHTHALMOLOGY
Payer: COMMERCIAL

## 2021-01-01 ENCOUNTER — HOSPITAL ENCOUNTER (OUTPATIENT)
Dept: BEHAVIORAL HEALTH | Facility: CLINIC | Age: 61
End: 2021-11-18
Attending: PSYCHIATRY & NEUROLOGY
Payer: COMMERCIAL

## 2021-01-01 ENCOUNTER — HOSPITAL ENCOUNTER (OUTPATIENT)
Dept: BEHAVIORAL HEALTH | Facility: CLINIC | Age: 61
End: 2021-10-21
Attending: PSYCHIATRY & NEUROLOGY
Payer: COMMERCIAL

## 2021-01-01 ENCOUNTER — TRANSFERRED RECORDS (OUTPATIENT)
Dept: HEALTH INFORMATION MANAGEMENT | Facility: CLINIC | Age: 61
End: 2021-01-01
Payer: COMMERCIAL

## 2021-01-01 ENCOUNTER — TELEPHONE (OUTPATIENT)
Dept: RHEUMATOLOGY | Facility: CLINIC | Age: 61
End: 2021-01-01

## 2021-01-01 ENCOUNTER — APPOINTMENT (OUTPATIENT)
Dept: MRI IMAGING | Facility: CLINIC | Age: 61
End: 2021-01-01
Attending: FAMILY MEDICINE
Payer: COMMERCIAL

## 2021-01-01 ENCOUNTER — ANESTHESIA EVENT (OUTPATIENT)
Dept: SURGERY | Facility: AMBULATORY SURGERY CENTER | Age: 61
End: 2021-01-01

## 2021-01-01 ENCOUNTER — VIRTUAL VISIT (OUTPATIENT)
Dept: OPHTHALMOLOGY | Facility: CLINIC | Age: 61
End: 2021-01-01
Payer: COMMERCIAL

## 2021-01-01 VITALS
RESPIRATION RATE: 14 BRPM | OXYGEN SATURATION: 99 % | BODY MASS INDEX: 20.25 KG/M2 | TEMPERATURE: 97.4 F | WEIGHT: 129 LBS | SYSTOLIC BLOOD PRESSURE: 114 MMHG | HEIGHT: 67 IN | DIASTOLIC BLOOD PRESSURE: 71 MMHG | HEART RATE: 63 BPM

## 2021-01-01 VITALS
WEIGHT: 132.8 LBS | WEIGHT: 135 LBS | HEIGHT: 66 IN | HEIGHT: 66 IN | BODY MASS INDEX: 21.69 KG/M2 | BODY MASS INDEX: 21.34 KG/M2

## 2021-01-01 VITALS
HEIGHT: 66 IN | WEIGHT: 128.9 LBS | TEMPERATURE: 97.6 F | OXYGEN SATURATION: 100 % | BODY MASS INDEX: 20.72 KG/M2 | DIASTOLIC BLOOD PRESSURE: 73 MMHG | SYSTOLIC BLOOD PRESSURE: 118 MMHG | HEART RATE: 72 BPM | RESPIRATION RATE: 20 BRPM

## 2021-01-01 VITALS
HEIGHT: 66 IN | DIASTOLIC BLOOD PRESSURE: 60 MMHG | WEIGHT: 128 LBS | RESPIRATION RATE: 20 BRPM | SYSTOLIC BLOOD PRESSURE: 106 MMHG | HEART RATE: 76 BPM | SYSTOLIC BLOOD PRESSURE: 118 MMHG | DIASTOLIC BLOOD PRESSURE: 74 MMHG | HEART RATE: 72 BPM | HEIGHT: 66 IN | TEMPERATURE: 98.4 F | RESPIRATION RATE: 16 BRPM | BODY MASS INDEX: 22.18 KG/M2 | WEIGHT: 138 LBS | TEMPERATURE: 98.4 F | BODY MASS INDEX: 20.57 KG/M2

## 2021-01-01 DIAGNOSIS — H46.9 OPTIC PERINEURITIS: Primary | ICD-10-CM

## 2021-01-01 DIAGNOSIS — Z79.52 CURRENT CHRONIC USE OF SYSTEMIC STEROIDS: ICD-10-CM

## 2021-01-01 DIAGNOSIS — H46.9 OPTIC NEURITIS: ICD-10-CM

## 2021-01-01 DIAGNOSIS — H53.40 VISUAL FIELD DEFECT: Primary | ICD-10-CM

## 2021-01-01 DIAGNOSIS — Z79.899 HIGH RISK MEDICATION USE: ICD-10-CM

## 2021-01-01 DIAGNOSIS — H46.9 OPTIC PERINEURITIS: ICD-10-CM

## 2021-01-01 DIAGNOSIS — Z11.59 ENCOUNTER FOR SCREENING FOR OTHER VIRAL DISEASES: ICD-10-CM

## 2021-01-01 DIAGNOSIS — H05.89 ORBITAL LESION: ICD-10-CM

## 2021-01-01 DIAGNOSIS — Z15.89 HLA B27 POSITIVE: ICD-10-CM

## 2021-01-01 DIAGNOSIS — H53.40 VISUAL FIELD DEFECT: ICD-10-CM

## 2021-01-01 DIAGNOSIS — H47.20 OPTIC ATROPHY: ICD-10-CM

## 2021-01-01 DIAGNOSIS — H33.312 OPERCULATED RETINAL TEAR OF LEFT EYE: ICD-10-CM

## 2021-01-01 DIAGNOSIS — H53.9 CHANGES IN VISION: ICD-10-CM

## 2021-01-01 DIAGNOSIS — H05.89 ORBITAL LESION: Primary | ICD-10-CM

## 2021-01-01 DIAGNOSIS — Z79.52 CURRENT CHRONIC USE OF SYSTEMIC STEROIDS: Primary | ICD-10-CM

## 2021-01-01 LAB
ALBUMIN SERPL-MCNC: 4.2 G/DL (ref 3.4–5)
ALBUMIN SERPL-MCNC: 4.2 G/DL (ref 3.4–5)
ALBUMIN SERPL-MCNC: 4.3 G/DL (ref 3.4–5)
ALBUMIN SERPL-MCNC: 4.3 G/DL (ref 3.4–5)
ALBUMIN SERPL-MCNC: 4.6 G/DL (ref 3.4–5)
ALBUMIN SERPL-MCNC: 5.2 G/DL (ref 3.4–5)
ALP SERPL-CCNC: 35 U/L (ref 40–150)
ALP SERPL-CCNC: 37 U/L (ref 40–150)
ALP SERPL-CCNC: 39 U/L (ref 40–150)
ALP SERPL-CCNC: 40 U/L (ref 40–150)
ALP SERPL-CCNC: 47 U/L (ref 40–150)
ALP SERPL-CCNC: 60 U/L (ref 40–150)
ALT SERPL W P-5'-P-CCNC: 21 U/L (ref 0–50)
ALT SERPL W P-5'-P-CCNC: 24 U/L (ref 0–50)
ALT SERPL W P-5'-P-CCNC: 26 U/L (ref 0–50)
ALT SERPL W P-5'-P-CCNC: 27 U/L (ref 0–50)
ALT SERPL W P-5'-P-CCNC: 31 U/L (ref 0–50)
ALT SERPL W P-5'-P-CCNC: 34 U/L (ref 0–50)
ALT SERPL-CCNC: 14 IU/L (ref 8–45)
ANION GAP SERPL CALCULATED.3IONS-SCNC: 3 MMOL/L (ref 3–14)
ANION GAP SERPL CALCULATED.3IONS-SCNC: 3 MMOL/L (ref 3–14)
ANION GAP SERPL CALCULATED.3IONS-SCNC: 5 MMOL/L (ref 3–14)
ANION GAP SERPL CALCULATED.3IONS-SCNC: 5 MMOL/L (ref 3–14)
ANION GAP SERPL CALCULATED.3IONS-SCNC: 6 MMOL/L (ref 3–14)
ANION GAP SERPL CALCULATED.3IONS-SCNC: 8 MMOL/L (ref 3–14)
APTT PPP: 27 SEC (ref 22–37)
AST SERPL W P-5'-P-CCNC: 15 U/L (ref 0–45)
AST SERPL W P-5'-P-CCNC: 16 U/L (ref 0–45)
AST SERPL W P-5'-P-CCNC: 19 U/L (ref 0–45)
AST SERPL W P-5'-P-CCNC: 22 U/L (ref 0–45)
AST SERPL W P-5'-P-CCNC: 23 U/L (ref 0–45)
AST SERPL W P-5'-P-CCNC: 33 U/L (ref 0–45)
AST SERPL-CCNC: 22 IU/L (ref 2–40)
BASOPHILS # BLD AUTO: 0 10E3/UL (ref 0–0.2)
BASOPHILS # BLD AUTO: 0 10E9/L (ref 0–0.2)
BASOPHILS NFR BLD AUTO: 0 %
BASOPHILS NFR BLD AUTO: 0.2 %
BASOPHILS NFR BLD AUTO: 0.3 %
BASOPHILS NFR BLD AUTO: 0.3 %
BASOPHILS NFR BLD AUTO: 0.4 %
BASOPHILS NFR BLD AUTO: 0.7 %
BILIRUB SERPL-MCNC: 0.2 MG/DL (ref 0.2–1.3)
BILIRUB SERPL-MCNC: 0.3 MG/DL (ref 0.2–1.3)
BILIRUB SERPL-MCNC: 0.5 MG/DL (ref 0.2–1.3)
BILIRUB SERPL-MCNC: 0.5 MG/DL (ref 0.2–1.3)
BUN SERPL-MCNC: 10 MG/DL (ref 7–30)
BUN SERPL-MCNC: 11 MG/DL (ref 7–30)
BUN SERPL-MCNC: 11 MG/DL (ref 7–30)
BUN SERPL-MCNC: 12 MG/DL (ref 7–30)
BUN SERPL-MCNC: 12 MG/DL (ref 7–30)
BUN SERPL-MCNC: 9 MG/DL (ref 7–30)
CALCIUM SERPL-MCNC: 8.8 MG/DL (ref 8.5–10.1)
CALCIUM SERPL-MCNC: 9 MG/DL (ref 8.5–10.1)
CALCIUM SERPL-MCNC: 9.3 MG/DL (ref 8.5–10.1)
CALCIUM SERPL-MCNC: 9.4 MG/DL (ref 8.5–10.1)
CHLORIDE BLD-SCNC: 102 MMOL/L (ref 94–109)
CHLORIDE SERPL-SCNC: 103 MMOL/L (ref 94–109)
CHLORIDE SERPL-SCNC: 104 MMOL/L (ref 94–109)
CHLORIDE SERPL-SCNC: 105 MMOL/L (ref 94–109)
CHLORIDE SERPL-SCNC: 105 MMOL/L (ref 94–109)
CHLORIDE SERPL-SCNC: 107 MMOL/L (ref 94–109)
CO2 SERPL-SCNC: 25 MMOL/L (ref 20–32)
CO2 SERPL-SCNC: 28 MMOL/L (ref 20–32)
CO2 SERPL-SCNC: 28 MMOL/L (ref 20–32)
CO2 SERPL-SCNC: 29 MMOL/L (ref 20–32)
CO2 SERPL-SCNC: 30 MMOL/L (ref 20–32)
CO2 SERPL-SCNC: 30 MMOL/L (ref 20–32)
COPATH REPORT: NORMAL
COPATH REPORT: NORMAL
CREAT SERPL-MCNC: 0.54 MG/DL (ref 0.52–1.04)
CREAT SERPL-MCNC: 0.58 MG/DL (ref 0.52–1.04)
CREAT SERPL-MCNC: 0.66 MG/DL (ref 0.52–1.04)
CREAT SERPL-MCNC: 0.69 MG/DL (ref 0.52–1.04)
CREAT SERPL-MCNC: 0.7 MG/DL (ref 0.52–1.04)
CREAT SERPL-MCNC: 1.01 MG/DL (ref 0.52–1.04)
CREATININE (EXTERNAL): 0.64 MG/DL (ref 0.57–1.11)
CRP SERPL-MCNC: <2.9 MG/L (ref 0–8)
DIFFERENTIAL METHOD BLD: ABNORMAL
DIFFERENTIAL METHOD BLD: NORMAL
EOSINOPHIL # BLD AUTO: 0 10E9/L (ref 0–0.7)
EOSINOPHIL # BLD AUTO: 0.1 10E3/UL (ref 0–0.7)
EOSINOPHIL # BLD AUTO: 0.1 10E9/L (ref 0–0.7)
EOSINOPHIL # BLD AUTO: 0.1 10E9/L (ref 0–0.7)
EOSINOPHIL # BLD AUTO: 0.2 10E9/L (ref 0–0.7)
EOSINOPHIL # BLD AUTO: 1.5 10E9/L (ref 0–0.7)
EOSINOPHIL NFR BLD AUTO: 0.3 %
EOSINOPHIL NFR BLD AUTO: 1 %
EOSINOPHIL NFR BLD AUTO: 15.6 %
EOSINOPHIL NFR BLD AUTO: 2.4 %
EOSINOPHIL NFR BLD AUTO: 2.4 %
EOSINOPHIL NFR BLD AUTO: 3.3 %
ERYTHROCYTE [DISTWIDTH] IN BLOOD BY AUTOMATED COUNT: 11.9 % (ref 10–15)
ERYTHROCYTE [DISTWIDTH] IN BLOOD BY AUTOMATED COUNT: 12 % (ref 10–15)
ERYTHROCYTE [DISTWIDTH] IN BLOOD BY AUTOMATED COUNT: 12.7 % (ref 10–15)
ERYTHROCYTE [DISTWIDTH] IN BLOOD BY AUTOMATED COUNT: 13.2 % (ref 10–15)
ERYTHROCYTE [DISTWIDTH] IN BLOOD BY AUTOMATED COUNT: 13.3 % (ref 10–15)
ERYTHROCYTE [DISTWIDTH] IN BLOOD BY AUTOMATED COUNT: 13.4 % (ref 10–15)
ERYTHROCYTE [SEDIMENTATION RATE] IN BLOOD BY WESTERGREN METHOD: 4 MM/H (ref 0–30)
ERYTHROCYTE [SEDIMENTATION RATE] IN BLOOD BY WESTERGREN METHOD: 4 MM/HR (ref 0–30)
ERYTHROCYTE [SEDIMENTATION RATE] IN BLOOD BY WESTERGREN METHOD: 5 MM/H (ref 0–30)
ERYTHROCYTE [SEDIMENTATION RATE] IN BLOOD BY WESTERGREN METHOD: 6 MM/H (ref 0–30)
ERYTHROCYTE [SEDIMENTATION RATE] IN BLOOD BY WESTERGREN METHOD: 6 MM/H (ref 0–30)
ERYTHROCYTE [SEDIMENTATION RATE] IN BLOOD BY WESTERGREN METHOD: 7 MM/H (ref 0–30)
GAMMA INTERFERON BACKGROUND BLD IA-ACNC: 0 IU/ML
GFR ESTIMATED (EXTERNAL): >60 ML/MIN/1.73M2
GFR ESTIMATED (IF AFRICAN AMERICAN) (EXTERNAL): >60 ML/MIN/1.73M2
GFR SERPL CREATININE-BSD FRML MDRD: 60 ML/MIN/{1.73_M2}
GFR SERPL CREATININE-BSD FRML MDRD: >90 ML/MIN/1.73M2
GFR SERPL CREATININE-BSD FRML MDRD: >90 ML/MIN/{1.73_M2}
GLUCOSE (EXTERNAL): 93 MG/DL (ref 65–100)
GLUCOSE BLD-MCNC: 72 MG/DL (ref 70–99)
GLUCOSE SERPL-MCNC: 104 MG/DL (ref 70–99)
GLUCOSE SERPL-MCNC: 76 MG/DL (ref 70–99)
GLUCOSE SERPL-MCNC: 77 MG/DL (ref 70–99)
GLUCOSE SERPL-MCNC: 85 MG/DL (ref 70–99)
GLUCOSE SERPL-MCNC: 88 MG/DL (ref 70–99)
HBV SURFACE AG SERPL QL IA: NONREACTIVE
HCT VFR BLD AUTO: 37.3 % (ref 35–47)
HCT VFR BLD AUTO: 37.3 % (ref 35–47)
HCT VFR BLD AUTO: 38.7 % (ref 35–47)
HCT VFR BLD AUTO: 39.1 % (ref 35–47)
HCT VFR BLD AUTO: 42.6 % (ref 35–47)
HCT VFR BLD AUTO: 43.6 % (ref 35–47)
HCV AB SERPL QL IA: NONREACTIVE
HGB BLD-MCNC: 12 G/DL (ref 11.7–15.7)
HGB BLD-MCNC: 12.2 G/DL (ref 11.7–15.7)
HGB BLD-MCNC: 12.5 G/DL (ref 11.7–15.7)
HGB BLD-MCNC: 12.6 G/DL (ref 11.7–15.7)
HGB BLD-MCNC: 14.2 G/DL (ref 11.7–15.7)
HGB BLD-MCNC: 14.3 G/DL (ref 11.7–15.7)
HIV1 AB SERPL QL IB: NEGATIVE
IMM GRANULOCYTES # BLD: 0 10E9/L (ref 0–0.4)
IMM GRANULOCYTES NFR BLD: 0.2 %
INR (EXTERNAL): 0.9
LABORATORY COMMENT REPORT: NORMAL
LYMPHOCYTES # BLD AUTO: 1 10E3/UL (ref 0.8–5.3)
LYMPHOCYTES # BLD AUTO: 1.1 10E9/L (ref 0.8–5.3)
LYMPHOCYTES # BLD AUTO: 1.5 10E9/L (ref 0.8–5.3)
LYMPHOCYTES # BLD AUTO: 1.5 10E9/L (ref 0.8–5.3)
LYMPHOCYTES # BLD AUTO: 1.7 10E9/L (ref 0.8–5.3)
LYMPHOCYTES # BLD AUTO: 1.7 10E9/L (ref 0.8–5.3)
LYMPHOCYTES NFR BLD AUTO: 16.6 %
LYMPHOCYTES NFR BLD AUTO: 17.8 %
LYMPHOCYTES NFR BLD AUTO: 21 %
LYMPHOCYTES NFR BLD AUTO: 27.7 %
LYMPHOCYTES NFR BLD AUTO: 29.4 %
LYMPHOCYTES NFR BLD AUTO: 38.4 %
M TB IFN-G CD4+ BCKGRND COR BLD-ACNC: 10 IU/ML
M TB TUBERC IFN-G BLD QL: NEGATIVE
MCH RBC QN AUTO: 29.3 PG (ref 26.5–33)
MCH RBC QN AUTO: 29.4 PG (ref 26.5–33)
MCH RBC QN AUTO: 29.9 PG (ref 26.5–33)
MCH RBC QN AUTO: 30.3 PG (ref 26.5–33)
MCH RBC QN AUTO: 30.7 PG (ref 26.5–33)
MCH RBC QN AUTO: 30.7 PG (ref 26.5–33)
MCHC RBC AUTO-ENTMCNC: 32 G/DL (ref 31.5–36.5)
MCHC RBC AUTO-ENTMCNC: 32.2 G/DL (ref 31.5–36.5)
MCHC RBC AUTO-ENTMCNC: 32.6 G/DL (ref 31.5–36.5)
MCHC RBC AUTO-ENTMCNC: 32.7 G/DL (ref 31.5–36.5)
MCHC RBC AUTO-ENTMCNC: 32.8 G/DL (ref 31.5–36.5)
MCHC RBC AUTO-ENTMCNC: 33.3 G/DL (ref 31.5–36.5)
MCV RBC AUTO: 90 FL (ref 78–100)
MCV RBC AUTO: 92 FL (ref 78–100)
MCV RBC AUTO: 94 FL (ref 78–100)
MCV RBC AUTO: 94 FL (ref 78–100)
MITOGEN IGNF BCKGRD COR BLD-ACNC: 0.01 IU/ML
MITOGEN IGNF BCKGRD COR BLD-ACNC: 0.04 IU/ML
MONOCYTES # BLD AUTO: 0.3 10E9/L (ref 0–1.3)
MONOCYTES # BLD AUTO: 0.4 10E9/L (ref 0–1.3)
MONOCYTES # BLD AUTO: 0.6 10E3/UL (ref 0–1.3)
MONOCYTES # BLD AUTO: 0.6 10E9/L (ref 0–1.3)
MONOCYTES # BLD AUTO: 0.8 10E9/L (ref 0–1.3)
MONOCYTES # BLD AUTO: 1 10E9/L (ref 0–1.3)
MONOCYTES NFR BLD AUTO: 10.7 %
MONOCYTES NFR BLD AUTO: 10.7 %
MONOCYTES NFR BLD AUTO: 12 %
MONOCYTES NFR BLD AUTO: 14.4 %
MONOCYTES NFR BLD AUTO: 4.7 %
MONOCYTES NFR BLD AUTO: 9.1 %
NEUTROPHILS # BLD AUTO: 2.2 10E9/L (ref 1.6–8.3)
NEUTROPHILS # BLD AUTO: 2.7 10E9/L (ref 1.6–8.3)
NEUTROPHILS # BLD AUTO: 3.2 10E3/UL (ref 1.6–8.3)
NEUTROPHILS # BLD AUTO: 3.2 10E9/L (ref 1.6–8.3)
NEUTROPHILS # BLD AUTO: 5.3 10E9/L (ref 1.6–8.3)
NEUTROPHILS # BLD AUTO: 5.4 10E9/L (ref 1.6–8.3)
NEUTROPHILS NFR BLD AUTO: 49.2 %
NEUTROPHILS NFR BLD AUTO: 52.5 %
NEUTROPHILS NFR BLD AUTO: 55.6 %
NEUTROPHILS NFR BLD AUTO: 59 %
NEUTROPHILS NFR BLD AUTO: 65 %
NEUTROPHILS NFR BLD AUTO: 78.1 %
NRBC # BLD AUTO: 0 10*3/UL
NRBC BLD AUTO-RTO: 0 /100
PLATELET # BLD AUTO: 266 10E9/L (ref 150–450)
PLATELET # BLD AUTO: 296 10E3/UL (ref 150–450)
PLATELET # BLD AUTO: 306 10E9/L (ref 150–450)
PLATELET # BLD AUTO: 329 10E9/L (ref 150–450)
PLATELET # BLD AUTO: 331 10E9/L (ref 150–450)
PLATELET # BLD AUTO: 346 10E9/L (ref 150–450)
POTASSIUM (EXTERNAL): 3.7 MMOL/L (ref 3.5–5)
POTASSIUM BLD-SCNC: 3.5 MMOL/L (ref 3.4–5.3)
POTASSIUM SERPL-SCNC: 3.4 MMOL/L (ref 3.4–5.3)
POTASSIUM SERPL-SCNC: 3.7 MMOL/L (ref 3.4–5.3)
POTASSIUM SERPL-SCNC: 3.7 MMOL/L (ref 3.4–5.3)
POTASSIUM SERPL-SCNC: 3.9 MMOL/L (ref 3.4–5.3)
POTASSIUM SERPL-SCNC: 3.9 MMOL/L (ref 3.4–5.3)
PROT SERPL-MCNC: 6.9 G/DL (ref 6.8–8.8)
PROT SERPL-MCNC: 7.2 G/DL (ref 6.8–8.8)
PROT SERPL-MCNC: 7.3 G/DL (ref 6.8–8.8)
PROT SERPL-MCNC: 7.5 G/DL (ref 6.8–8.8)
PROT SERPL-MCNC: 7.7 G/DL (ref 6.8–8.8)
PROT SERPL-MCNC: 8.9 G/DL (ref 6.8–8.8)
RBC # BLD AUTO: 3.96 10E12/L (ref 3.8–5.2)
RBC # BLD AUTO: 3.98 10E12/L (ref 3.8–5.2)
RBC # BLD AUTO: 4.22 10E12/L (ref 3.8–5.2)
RBC # BLD AUTO: 4.26 10E12/L (ref 3.8–5.2)
RBC # BLD AUTO: 4.62 10E6/UL (ref 3.8–5.2)
RBC # BLD AUTO: 4.87 10E12/L (ref 3.8–5.2)
SARS-COV-2 RNA RESP QL NAA+PROBE: NEGATIVE
SARS-COV-2 RNA RESP QL NAA+PROBE: NORMAL
SODIUM SERPL-SCNC: 135 MMOL/L (ref 133–144)
SODIUM SERPL-SCNC: 138 MMOL/L (ref 133–144)
SODIUM SERPL-SCNC: 140 MMOL/L (ref 133–144)
SPECIMEN SOURCE: NORMAL
SPECIMEN SOURCE: NORMAL
TPMT BLD-CCNC: 23 U/ML (ref 24–44)
TSH SERPL-ACNC: 1.02 UIU/ML (ref 0.35–4.94)
WBC # BLD AUTO: 4.5 10E9/L (ref 4–11)
WBC # BLD AUTO: 4.9 10E3/UL (ref 4–11)
WBC # BLD AUTO: 5.2 10E9/L (ref 4–11)
WBC # BLD AUTO: 5.5 10E9/L (ref 4–11)
WBC # BLD AUTO: 6.9 10E9/L (ref 4–11)
WBC # BLD AUTO: 9.4 10E9/L (ref 4–11)

## 2021-01-01 PROCEDURE — 67570 DECOMPRESS OPTIC NERVE: CPT | Mod: RT

## 2021-01-01 PROCEDURE — 90853 GROUP PSYCHOTHERAPY: CPT | Mod: GT,95 | Performed by: SOCIAL WORKER

## 2021-01-01 PROCEDURE — 85730 THROMBOPLASTIN TIME PARTIAL: CPT | Performed by: FAMILY MEDICINE

## 2021-01-01 PROCEDURE — 255N000002 HC RX 255 OP 636: Performed by: FAMILY MEDICINE

## 2021-01-01 PROCEDURE — 92133 CPTRZD OPH DX IMG PST SGM ON: CPT | Performed by: OPHTHALMOLOGY

## 2021-01-01 PROCEDURE — 99214 OFFICE O/P EST MOD 30 MIN: CPT | Mod: 95 | Performed by: INTERNAL MEDICINE

## 2021-01-01 PROCEDURE — G0463 HOSPITAL OUTPT CLINIC VISIT: HCPCS

## 2021-01-01 PROCEDURE — 90853 GROUP PSYCHOTHERAPY: CPT | Mod: GT,95

## 2021-01-01 PROCEDURE — 85025 COMPLETE CBC W/AUTO DIFF WBC: CPT

## 2021-01-01 PROCEDURE — 85652 RBC SED RATE AUTOMATED: CPT | Performed by: INTERNAL MEDICINE

## 2021-01-01 PROCEDURE — 92250 FUNDUS PHOTOGRAPHY W/I&R: CPT | Performed by: OPHTHALMOLOGY

## 2021-01-01 PROCEDURE — U0003 INFECTIOUS AGENT DETECTION BY NUCLEIC ACID (DNA OR RNA); SEVERE ACUTE RESPIRATORY SYNDROME CORONAVIRUS 2 (SARS-COV-2) (CORONAVIRUS DISEASE [COVID-19]), AMPLIFIED PROBE TECHNIQUE, MAKING USE OF HIGH THROUGHPUT TECHNOLOGIES AS DESCRIBED BY CMS-2020-01-R: HCPCS | Performed by: OPHTHALMOLOGY

## 2021-01-01 PROCEDURE — 99207 PR CDG-MDM COMPONENT: MEETS MODERATE - DOWN CODED: CPT | Performed by: PSYCHIATRY & NEUROLOGY

## 2021-01-01 PROCEDURE — 36415 COLL VENOUS BLD VENIPUNCTURE: CPT | Performed by: INTERNAL MEDICINE

## 2021-01-01 PROCEDURE — 86140 C-REACTIVE PROTEIN: CPT | Performed by: FAMILY MEDICINE

## 2021-01-01 PROCEDURE — 82657 ENZYME CELL ACTIVITY: CPT | Mod: 90 | Performed by: PATHOLOGY

## 2021-01-01 PROCEDURE — 70543 MRI ORBT/FAC/NCK W/O &W/DYE: CPT | Performed by: RADIOLOGY

## 2021-01-01 PROCEDURE — A9585 GADOBUTROL INJECTION: HCPCS | Performed by: RADIOLOGY

## 2021-01-01 PROCEDURE — 99214 OFFICE O/P EST MOD 30 MIN: CPT | Mod: 95 | Performed by: PSYCHIATRY & NEUROLOGY

## 2021-01-01 PROCEDURE — 86140 C-REACTIVE PROTEIN: CPT | Performed by: INTERNAL MEDICINE

## 2021-01-01 PROCEDURE — 99285 EMERGENCY DEPT VISIT HI MDM: CPT | Performed by: FAMILY MEDICINE

## 2021-01-01 PROCEDURE — 92014 COMPRE OPH EXAM EST PT 1/>: CPT | Mod: GC | Performed by: OPHTHALMOLOGY

## 2021-01-01 PROCEDURE — 99213 OFFICE O/P EST LOW 20 MIN: CPT | Mod: GC | Performed by: OPHTHALMOLOGY

## 2021-01-01 PROCEDURE — 86481 TB AG RESPONSE T-CELL SUSP: CPT | Mod: 90 | Performed by: PATHOLOGY

## 2021-01-01 PROCEDURE — 85652 RBC SED RATE AUTOMATED: CPT

## 2021-01-01 PROCEDURE — 92083 EXTENDED VISUAL FIELD XM: CPT | Performed by: OPHTHALMOLOGY

## 2021-01-01 PROCEDURE — 86140 C-REACTIVE PROTEIN: CPT

## 2021-01-01 PROCEDURE — 85025 COMPLETE CBC W/AUTO DIFF WBC: CPT | Performed by: INTERNAL MEDICINE

## 2021-01-01 PROCEDURE — 36415 COLL VENOUS BLD VENIPUNCTURE: CPT

## 2021-01-01 PROCEDURE — 250N000012 HC RX MED GY IP 250 OP 636 PS 637: Performed by: EMERGENCY MEDICINE

## 2021-01-01 PROCEDURE — 90791 PSYCH DIAGNOSTIC EVALUATION: CPT | Mod: GT,95 | Performed by: SOCIAL WORKER

## 2021-01-01 PROCEDURE — 92083 EXTENDED VISUAL FIELD XM: CPT | Mod: 26 | Performed by: OPHTHALMOLOGY

## 2021-01-01 PROCEDURE — 85652 RBC SED RATE AUTOMATED: CPT | Performed by: FAMILY MEDICINE

## 2021-01-01 PROCEDURE — 99214 OFFICE O/P EST MOD 30 MIN: CPT | Performed by: OPHTHALMOLOGY

## 2021-01-01 PROCEDURE — A9585 GADOBUTROL INJECTION: HCPCS | Performed by: FAMILY MEDICINE

## 2021-01-01 PROCEDURE — 99285 EMERGENCY DEPT VISIT HI MDM: CPT | Mod: 25

## 2021-01-01 PROCEDURE — 90853 GROUP PSYCHOTHERAPY: CPT | Mod: GT,95 | Performed by: COUNSELOR

## 2021-01-01 PROCEDURE — 70543 MRI ORBT/FAC/NCK W/O &W/DYE: CPT | Mod: 26 | Performed by: RADIOLOGY

## 2021-01-01 PROCEDURE — 88189 FLOWCYTOMETRY/READ 16 & >: CPT | Mod: GC | Performed by: STUDENT IN AN ORGANIZED HEALTH CARE EDUCATION/TRAINING PROGRAM

## 2021-01-01 PROCEDURE — 80053 COMPREHEN METABOLIC PANEL: CPT | Performed by: INTERNAL MEDICINE

## 2021-01-01 PROCEDURE — 99214 OFFICE O/P EST MOD 30 MIN: CPT | Mod: 24 | Performed by: OPHTHALMOLOGY

## 2021-01-01 PROCEDURE — 86689 HTLV/HIV CONFIRMJ ANTIBODY: CPT | Mod: 90 | Performed by: PATHOLOGY

## 2021-01-01 PROCEDURE — 80053 COMPREHEN METABOLIC PANEL: CPT

## 2021-01-01 PROCEDURE — 85025 COMPLETE CBC W/AUTO DIFF WBC: CPT | Performed by: FAMILY MEDICINE

## 2021-01-01 PROCEDURE — 99213 OFFICE O/P EST LOW 20 MIN: CPT | Mod: 95 | Performed by: OPHTHALMOLOGY

## 2021-01-01 PROCEDURE — 92133 CPTRZD OPH DX IMG PST SGM ON: CPT | Mod: 26 | Performed by: OPHTHALMOLOGY

## 2021-01-01 PROCEDURE — 36415 COLL VENOUS BLD VENIPUNCTURE: CPT | Performed by: PATHOLOGY

## 2021-01-01 PROCEDURE — 99000 SPECIMEN HANDLING OFFICE-LAB: CPT | Performed by: PATHOLOGY

## 2021-01-01 PROCEDURE — 80053 COMPREHEN METABOLIC PANEL: CPT | Performed by: FAMILY MEDICINE

## 2021-01-01 PROCEDURE — 86803 HEPATITIS C AB TEST: CPT | Mod: 90 | Performed by: PATHOLOGY

## 2021-01-01 PROCEDURE — 70543 MRI ORBT/FAC/NCK W/O &W/DYE: CPT

## 2021-01-01 PROCEDURE — 87340 HEPATITIS B SURFACE AG IA: CPT | Mod: 90 | Performed by: PATHOLOGY

## 2021-01-01 PROCEDURE — 70553 MRI BRAIN STEM W/O & W/DYE: CPT | Performed by: RADIOLOGY

## 2021-01-01 PROCEDURE — 99215 OFFICE O/P EST HI 40 MIN: CPT | Performed by: OPHTHALMOLOGY

## 2021-01-01 PROCEDURE — 88305 TISSUE EXAM BY PATHOLOGIST: CPT | Mod: GC | Performed by: OPHTHALMOLOGY

## 2021-01-01 PROCEDURE — U0005 INFEC AGEN DETEC AMPLI PROBE: HCPCS | Performed by: OPHTHALMOLOGY

## 2021-01-01 PROCEDURE — 99205 OFFICE O/P NEW HI 60 MIN: CPT | Mod: 95 | Performed by: INTERNAL MEDICINE

## 2021-01-01 RX ORDER — AZATHIOPRINE 50 MG/1
25 TABLET ORAL DAILY
Qty: 45 TABLET | Refills: 0 | Status: SHIPPED | OUTPATIENT
Start: 2021-01-01 | End: 2021-01-01

## 2021-01-01 RX ORDER — PREDNISONE 2.5 MG/1
5 TABLET ORAL DAILY
Qty: 120 TABLET | Refills: 0 | Status: CANCELLED | OUTPATIENT
Start: 2021-01-01

## 2021-01-01 RX ORDER — ALBUTEROL SULFATE 90 UG/1
2 AEROSOL, METERED RESPIRATORY (INHALATION)
COMMUNITY
Start: 2020-01-01

## 2021-01-01 RX ORDER — OXYCODONE HYDROCHLORIDE 5 MG/1
5 TABLET ORAL EVERY 4 HOURS PRN
Status: DISCONTINUED | OUTPATIENT
Start: 2021-01-01 | End: 2021-01-01 | Stop reason: HOSPADM

## 2021-01-01 RX ORDER — ONDANSETRON 4 MG/1
4 TABLET, ORALLY DISINTEGRATING ORAL EVERY 30 MIN PRN
Status: DISCONTINUED | OUTPATIENT
Start: 2021-01-01 | End: 2021-01-01 | Stop reason: HOSPADM

## 2021-01-01 RX ORDER — GADOBUTROL 604.72 MG/ML
7.5 INJECTION INTRAVENOUS ONCE
Status: COMPLETED | OUTPATIENT
Start: 2021-01-01 | End: 2021-01-01

## 2021-01-01 RX ORDER — PREDNISONE 2.5 MG/1
5 TABLET ORAL DAILY
Qty: 120 TABLET | Refills: 0 | Status: SHIPPED | OUTPATIENT
Start: 2021-01-01 | End: 2021-01-01

## 2021-01-01 RX ORDER — ERYTHROMYCIN 5 MG/G
OINTMENT OPHTHALMIC
Qty: 3.5 G | Refills: 0 | Status: SHIPPED | OUTPATIENT
Start: 2021-01-01 | End: 2021-01-01

## 2021-01-01 RX ORDER — NALOXONE HYDROCHLORIDE 0.4 MG/ML
0.2 INJECTION, SOLUTION INTRAMUSCULAR; INTRAVENOUS; SUBCUTANEOUS
Status: DISCONTINUED | OUTPATIENT
Start: 2021-01-01 | End: 2021-01-01 | Stop reason: HOSPADM

## 2021-01-01 RX ORDER — GADOBUTROL 604.72 MG/ML
0.1 INJECTION INTRAVENOUS ONCE
Status: COMPLETED | OUTPATIENT
Start: 2021-01-01 | End: 2021-01-01

## 2021-01-01 RX ORDER — DIVALPROEX SODIUM 250 MG/1
TABLET, DELAYED RELEASE ORAL
COMMUNITY
Start: 2021-01-01

## 2021-01-01 RX ORDER — CLONAZEPAM 1 MG/1
TABLET ORAL
COMMUNITY
Start: 2021-01-01

## 2021-01-01 RX ORDER — PREDNISONE 2.5 MG/1
2.5 TABLET ORAL DAILY
Qty: 15 TABLET | Refills: 1 | Status: SHIPPED | OUTPATIENT
Start: 2021-01-01 | End: 2021-01-01

## 2021-01-01 RX ORDER — ERYTHROMYCIN 5 MG/G
OINTMENT OPHTHALMIC PRN
Status: DISCONTINUED | OUTPATIENT
Start: 2021-01-01 | End: 2021-01-01 | Stop reason: HOSPADM

## 2021-01-01 RX ORDER — HYDROXYZINE HYDROCHLORIDE 25 MG/1
25 TABLET, FILM COATED ORAL AT BEDTIME
Qty: 30 TABLET | Refills: 1 | Status: CANCELLED | OUTPATIENT
Start: 2021-01-01

## 2021-01-01 RX ORDER — MEPERIDINE HYDROCHLORIDE 25 MG/ML
12.5 INJECTION INTRAMUSCULAR; INTRAVENOUS; SUBCUTANEOUS
Status: DISCONTINUED | OUTPATIENT
Start: 2021-01-01 | End: 2021-01-01 | Stop reason: HOSPADM

## 2021-01-01 RX ORDER — ONDANSETRON 2 MG/ML
INJECTION INTRAMUSCULAR; INTRAVENOUS PRN
Status: DISCONTINUED | OUTPATIENT
Start: 2021-01-01 | End: 2021-01-01

## 2021-01-01 RX ORDER — PREDNISONE 20 MG/1
TABLET ORAL
Qty: 70 TABLET | Refills: 0
Start: 2021-01-01 | End: 2021-01-01

## 2021-01-01 RX ORDER — AZATHIOPRINE 50 MG/1
50 TABLET ORAL 2 TIMES DAILY
Qty: 180 TABLET | Refills: 0 | Status: SHIPPED | OUTPATIENT
Start: 2021-01-01

## 2021-01-01 RX ORDER — GABAPENTIN 300 MG/1
300 CAPSULE ORAL ONCE
Status: COMPLETED | OUTPATIENT
Start: 2021-01-01 | End: 2021-01-01

## 2021-01-01 RX ORDER — PREDNISONE 20 MG/1
5 TABLET ORAL
COMMUNITY
Start: 2020-09-14 | End: 2021-01-01

## 2021-01-01 RX ORDER — ACETAMINOPHEN 325 MG/1
975 TABLET ORAL ONCE
Status: COMPLETED | OUTPATIENT
Start: 2021-01-01 | End: 2021-01-01

## 2021-01-01 RX ORDER — HYDROXYZINE HYDROCHLORIDE 25 MG/1
25 TABLET, FILM COATED ORAL AT BEDTIME
Qty: 30 TABLET | Refills: 1 | Status: SHIPPED | OUTPATIENT
Start: 2021-01-01 | End: 2021-01-01

## 2021-01-01 RX ORDER — TRAZODONE HYDROCHLORIDE 50 MG/1
TABLET, FILM COATED ORAL
COMMUNITY
Start: 2021-01-01

## 2021-01-01 RX ORDER — PREDNISONE 20 MG/1
100 TABLET ORAL DAILY
Qty: 70 TABLET | Refills: 0 | Status: SHIPPED | OUTPATIENT
Start: 2021-01-01 | End: 2021-01-01

## 2021-01-01 RX ORDER — HYDROCODONE BITARTRATE AND ACETAMINOPHEN 5; 325 MG/1; MG/1
1 TABLET ORAL EVERY 6 HOURS PRN
Qty: 10 TABLET | Refills: 0 | Status: SHIPPED | OUTPATIENT
Start: 2021-01-01 | End: 2021-01-01

## 2021-01-01 RX ORDER — SODIUM CHLORIDE, SODIUM LACTATE, POTASSIUM CHLORIDE, CALCIUM CHLORIDE 600; 310; 30; 20 MG/100ML; MG/100ML; MG/100ML; MG/100ML
INJECTION, SOLUTION INTRAVENOUS CONTINUOUS
Status: DISCONTINUED | OUTPATIENT
Start: 2021-01-01 | End: 2021-01-01 | Stop reason: HOSPADM

## 2021-01-01 RX ORDER — LIDOCAINE 40 MG/G
CREAM TOPICAL
Status: DISCONTINUED | OUTPATIENT
Start: 2021-01-01 | End: 2021-01-01 | Stop reason: HOSPADM

## 2021-01-01 RX ORDER — LIDOCAINE HYDROCHLORIDE 20 MG/ML
INJECTION, SOLUTION INFILTRATION; PERINEURAL PRN
Status: DISCONTINUED | OUTPATIENT
Start: 2021-01-01 | End: 2021-01-01

## 2021-01-01 RX ORDER — PHENOL 1.4 %
10 AEROSOL, SPRAY (ML) MUCOUS MEMBRANE
COMMUNITY
Start: 2020-10-26

## 2021-01-01 RX ORDER — NALOXONE HYDROCHLORIDE 0.4 MG/ML
0.4 INJECTION, SOLUTION INTRAMUSCULAR; INTRAVENOUS; SUBCUTANEOUS
Status: DISCONTINUED | OUTPATIENT
Start: 2021-01-01 | End: 2021-01-01 | Stop reason: HOSPADM

## 2021-01-01 RX ORDER — LITHIUM CARBONATE 300 MG
TABLET ORAL
COMMUNITY
Start: 2021-01-01

## 2021-01-01 RX ORDER — AMMONIUM LACTATE 12 G/100G
CREAM TOPICAL
COMMUNITY
Start: 2021-01-01

## 2021-01-01 RX ORDER — FAMOTIDINE 20 MG/1
TABLET, FILM COATED ORAL
COMMUNITY
Start: 2021-01-01

## 2021-01-01 RX ORDER — AZATHIOPRINE 50 MG/1
TABLET ORAL
Qty: 135 TABLET | Refills: 0 | Status: CANCELLED | OUTPATIENT
Start: 2021-01-01

## 2021-01-01 RX ORDER — FENTANYL CITRATE 50 UG/ML
INJECTION, SOLUTION INTRAMUSCULAR; INTRAVENOUS PRN
Status: DISCONTINUED | OUTPATIENT
Start: 2021-01-01 | End: 2021-01-01

## 2021-01-01 RX ORDER — DEXAMETHASONE SODIUM PHOSPHATE 4 MG/ML
INJECTION, SOLUTION INTRA-ARTICULAR; INTRALESIONAL; INTRAMUSCULAR; INTRAVENOUS; SOFT TISSUE PRN
Status: DISCONTINUED | OUTPATIENT
Start: 2021-01-01 | End: 2021-01-01

## 2021-01-01 RX ORDER — ESCITALOPRAM OXALATE 10 MG/1
10 TABLET ORAL
COMMUNITY
Start: 2021-01-01

## 2021-01-01 RX ORDER — ACETAMINOPHEN 500 MG
TABLET ORAL
COMMUNITY
Start: 2021-01-01

## 2021-01-01 RX ORDER — PREDNISONE 50 MG/1
100 TABLET ORAL ONCE
Status: COMPLETED | OUTPATIENT
Start: 2021-01-01 | End: 2021-01-01

## 2021-01-01 RX ORDER — FENTANYL CITRATE 50 UG/ML
25-50 INJECTION, SOLUTION INTRAMUSCULAR; INTRAVENOUS
Status: DISCONTINUED | OUTPATIENT
Start: 2021-01-01 | End: 2021-01-01 | Stop reason: HOSPADM

## 2021-01-01 RX ORDER — COVID-19 ANTIGEN TEST
220 KIT MISCELLANEOUS 2 TIMES DAILY WITH MEALS
COMMUNITY
End: 2021-01-01

## 2021-01-01 RX ORDER — HYDROMORPHONE HYDROCHLORIDE 1 MG/ML
.3-.5 INJECTION, SOLUTION INTRAMUSCULAR; INTRAVENOUS; SUBCUTANEOUS EVERY 10 MIN PRN
Status: DISCONTINUED | OUTPATIENT
Start: 2021-01-01 | End: 2021-01-01 | Stop reason: HOSPADM

## 2021-01-01 RX ORDER — PROPOFOL 10 MG/ML
INJECTION, EMULSION INTRAVENOUS PRN
Status: DISCONTINUED | OUTPATIENT
Start: 2021-01-01 | End: 2021-01-01

## 2021-01-01 RX ORDER — PROPOFOL 10 MG/ML
INJECTION, EMULSION INTRAVENOUS CONTINUOUS PRN
Status: DISCONTINUED | OUTPATIENT
Start: 2021-01-01 | End: 2021-01-01

## 2021-01-01 RX ORDER — AZATHIOPRINE 50 MG/1
50 TABLET ORAL 2 TIMES DAILY
Qty: 180 TABLET | Refills: 0 | Status: SHIPPED | OUTPATIENT
Start: 2021-01-01 | End: 2021-01-01

## 2021-01-01 RX ORDER — AZATHIOPRINE 50 MG/1
TABLET ORAL
COMMUNITY
Start: 2021-01-01 | End: 2021-01-01

## 2021-01-01 RX ORDER — OLANZAPINE 10 MG/1
TABLET ORAL
COMMUNITY

## 2021-01-01 RX ORDER — ONDANSETRON 2 MG/ML
4 INJECTION INTRAMUSCULAR; INTRAVENOUS EVERY 30 MIN PRN
Status: DISCONTINUED | OUTPATIENT
Start: 2021-01-01 | End: 2021-01-01 | Stop reason: HOSPADM

## 2021-01-01 RX ADMIN — LIDOCAINE HYDROCHLORIDE 60 MG: 20 INJECTION, SOLUTION INFILTRATION; PERINEURAL at 11:48

## 2021-01-01 RX ADMIN — ONDANSETRON 4 MG: 2 INJECTION INTRAMUSCULAR; INTRAVENOUS at 12:45

## 2021-01-01 RX ADMIN — PROPOFOL 150 MG: 10 INJECTION, EMULSION INTRAVENOUS at 11:48

## 2021-01-01 RX ADMIN — FENTANYL CITRATE 50 MCG: 50 INJECTION, SOLUTION INTRAMUSCULAR; INTRAVENOUS at 11:48

## 2021-01-01 RX ADMIN — PREDNISONE 100 MG: 50 TABLET ORAL at 17:24

## 2021-01-01 RX ADMIN — ONDANSETRON 4 MG: 2 INJECTION INTRAMUSCULAR; INTRAVENOUS at 11:51

## 2021-01-01 RX ADMIN — GABAPENTIN 300 MG: 300 CAPSULE ORAL at 10:20

## 2021-01-01 RX ADMIN — DEXAMETHASONE SODIUM PHOSPHATE 10 MG: 4 INJECTION, SOLUTION INTRA-ARTICULAR; INTRALESIONAL; INTRAMUSCULAR; INTRAVENOUS; SOFT TISSUE at 11:51

## 2021-01-01 RX ADMIN — GADOBUTROL 5.8 ML: 604.72 INJECTION INTRAVENOUS at 13:22

## 2021-01-01 RX ADMIN — PROPOFOL 150 MCG/KG/MIN: 10 INJECTION, EMULSION INTRAVENOUS at 11:48

## 2021-01-01 RX ADMIN — GADOBUTROL 6 ML: 604.72 INJECTION INTRAVENOUS at 07:47

## 2021-01-01 RX ADMIN — SODIUM CHLORIDE, SODIUM LACTATE, POTASSIUM CHLORIDE, CALCIUM CHLORIDE: 600; 310; 30; 20 INJECTION, SOLUTION INTRAVENOUS at 10:31

## 2021-01-01 RX ADMIN — ACETAMINOPHEN 975 MG: 325 TABLET ORAL at 10:20

## 2021-01-01 ASSESSMENT — VISUAL ACUITY
OD_CC: 20/25
OD_CC+: -1
OD_CC: 20/20
OD_CC: 20/20
METHOD: SNELLEN - LINEAR
OS_CC: 20/20
METHOD: SNELLEN - LINEAR
OD_CC: 20/20
METHOD: SNELLEN - LINEAR
OS_CC: 20/20SLOW
OD: WITH CORRECTIVE LENSES;20/30
OS: WITH CORRECTIVE LENSES;20/40
OD_CC: 20/20
CORRECTION_TYPE: GLASSES
OS_CC+: -1
METHOD: SNELLEN - LINEAR
OS_CC: 20/25
OS_CC+: -2
OD_CC+: -2
OD_CC+: -2
CORRECTION_TYPE: GLASSES
OS_CC+: -1
METHOD: SNELLEN - LINEAR
OS_CC: 20/20
OD_CC+: -2
CORRECTION_TYPE: GLASSES
OS_CC: 20/25

## 2021-01-01 ASSESSMENT — REFRACTION_WEARINGRX
OS_ADD: +2.25
OD_ADD: +2.25
OS_CYLINDER: +0.25
OS_SPHERE: -5.25
OS_AXIS: 099
SPECS_TYPE: PAL
OD_ADD: +2.25
SPECS_TYPE: PAL
OS_ADD: +2.25
OD_CYLINDER: +3.00
OS_CYLINDER: +0.25
OS_ADD: +2.25
OS_SPHERE: -5.25
OD_AXIS: 076
OS_SPHERE: -5.25
OD_SPHERE: -9.00
SPECS_TYPE: PAL
OD_CYLINDER: +3.00
OS_ADD: +2.25
OD_ADD: +2.25
OD_ADD: +2.25
OD_CYLINDER: +3.00
OS_CYLINDER: +0.25
OD_SPHERE: -9.00
OS_ADD: +2.25
OS_SPHERE: -5.25
SPECS_TYPE: PAL
OS_AXIS: 099
OD_ADD: +2.25
OD_SPHERE: -9.00
OS_AXIS: 099
OD_AXIS: 076
OD_AXIS: 076
OD_CYLINDER: +3.00
OS_CYLINDER: +0.25
OD_SPHERE: -9.00
OS_AXIS: 099
OD_SPHERE: -9.00
OS_SPHERE: -5.25
OD_CYLINDER: +3.00
OS_CYLINDER: +0.25
OD_AXIS: 076
OD_AXIS: 076
SPECS_TYPE: PAL
OS_AXIS: 099

## 2021-01-01 ASSESSMENT — CONF VISUAL FIELD
OD_NORMAL: 1
OS_NORMAL: 1
OD_INFERIOR_NASAL_RESTRICTION: 1
OD_INFERIOR_NASAL_RESTRICTION: 3
OD_INFERIOR_NASAL_RESTRICTION: 1
OS_NORMAL: 1
METHOD: COUNTING FINGERS
METHOD: COUNTING FINGERS
OD_INFERIOR_TEMPORAL_RESTRICTION: 3
METHOD: COUNTING FINGERS
OD_INFERIOR_TEMPORAL_RESTRICTION: 1
OD_INFERIOR_TEMPORAL_RESTRICTION: 1
OS_NORMAL: 1
OS_NORMAL: 1
OD_NORMAL: 1
OS_NORMAL: 1

## 2021-01-01 ASSESSMENT — EXTERNAL EXAM - RIGHT EYE
OD_EXAM: NORMAL

## 2021-01-01 ASSESSMENT — EXTERNAL EXAM - LEFT EYE
OS_EXAM: NORMAL

## 2021-01-01 ASSESSMENT — COLUMBIA-SUICIDE SEVERITY RATING SCALE - C-SSRS
6. HAVE YOU EVER DONE ANYTHING, STARTED TO DO ANYTHING, OR PREPARED TO DO ANYTHING TO END YOUR LIFE?: NO
6. HAVE YOU EVER DONE ANYTHING, STARTED TO DO ANYTHING, OR PREPARED TO DO ANYTHING TO END YOUR LIFE?: NO
3. HAVE YOU BEEN THINKING ABOUT HOW YOU MIGHT KILL YOURSELF?: NO
TOTAL  NUMBER OF ABORTED OR SELF INTERRUPTED ATTEMPTS PAST 3 MONTHS: NO
ATTEMPT LIFETIME: NO
5. HAVE YOU STARTED TO WORK OUT OR WORKED OUT THE DETAILS OF HOW TO KILL YOURSELF? DO YOU INTEND TO CARRY OUT THIS PLAN?: NO
3. HAVE YOU BEEN THINKING ABOUT HOW YOU MIGHT KILL YOURSELF?: NO
1. IN THE PAST MONTH, HAVE YOU WISHED YOU WERE DEAD OR WISHED YOU COULD GO TO SLEEP AND NOT WAKE UP?: YES
5. HAVE YOU STARTED TO WORK OUT OR WORKED OUT THE DETAILS OF HOW TO KILL YOURSELF? DO YOU INTEND TO CARRY OUT THIS PLAN?: NO
2. HAVE YOU ACTUALLY HAD ANY THOUGHTS OF KILLING YOURSELF?: NO
TOTAL  NUMBER OF INTERRUPTED ATTEMPTS PAST 3 MONTHS: NO
5. HAVE YOU STARTED TO WORK OUT OR WORKED OUT THE DETAILS OF HOW TO KILL YOURSELF? DO YOU INTEND TO CARRY OUT THIS PLAN?: NO
1. IN THE PAST MONTH, HAVE YOU WISHED YOU WERE DEAD OR WISHED YOU COULD GO TO SLEEP AND NOT WAKE UP?: YES
TOTAL  NUMBER OF ABORTED OR SELF INTERRUPTED ATTEMPTS PAST LIFETIME: NO
4. HAVE YOU HAD THESE THOUGHTS AND HAD SOME INTENTION OF ACTING ON THEM?: NO
ATTEMPT PAST THREE MONTHS: NO
TOTAL  NUMBER OF INTERRUPTED ATTEMPTS LIFETIME: NO
4. HAVE YOU HAD THESE THOUGHTS AND HAD SOME INTENTION OF ACTING ON THEM?: NO
2. HAVE YOU ACTUALLY HAD ANY THOUGHTS OF KILLING YOURSELF LIFETIME?: NO

## 2021-01-01 ASSESSMENT — SLIT LAMP EXAM - LIDS
COMMENTS: NORMAL

## 2021-01-01 ASSESSMENT — ANXIETY QUESTIONNAIRES
GAD7 TOTAL SCORE: 17
7. FEELING AFRAID AS IF SOMETHING AWFUL MIGHT HAPPEN: NEARLY EVERY DAY
5. BEING SO RESTLESS THAT IT IS HARD TO SIT STILL: NEARLY EVERY DAY
6. BECOMING EASILY ANNOYED OR IRRITABLE: SEVERAL DAYS
GAD7 TOTAL SCORE: 17
3. WORRYING TOO MUCH ABOUT DIFFERENT THINGS: MORE THAN HALF THE DAYS
2. NOT BEING ABLE TO STOP OR CONTROL WORRYING: NEARLY EVERY DAY
1. FEELING NERVOUS, ANXIOUS, OR ON EDGE: MORE THAN HALF THE DAYS
GAD7 TOTAL SCORE: 17
8. IF YOU CHECKED OFF ANY PROBLEMS, HOW DIFFICULT HAVE THESE MADE IT FOR YOU TO DO YOUR WORK, TAKE CARE OF THINGS AT HOME, OR GET ALONG WITH OTHER PEOPLE?: EXTREMELY DIFFICULT
7. FEELING AFRAID AS IF SOMETHING AWFUL MIGHT HAPPEN: NEARLY EVERY DAY
GAD7 TOTAL SCORE: 17
4. TROUBLE RELAXING: NEARLY EVERY DAY

## 2021-01-01 ASSESSMENT — TONOMETRY
IOP_METHOD: ICARE
IOP_METHOD: ICARE
OS_IOP_MMHG: 11
OS_IOP_MMHG: 14
OD_IOP_MMHG: 15
IOP_METHOD: TONOPEN
OS_IOP_MMHG: 20
OD_IOP_MMHG: 16
OD_IOP_MMHG: 22
OS_IOP_MMHG: 20
IOP_METHOD: ICARE
OS_IOP_MMHG: 24
OD_IOP_MMHG: 12
IOP_METHOD: APPLANATION
OD_IOP_MMHG: 19
IOP_METHOD: ICARE
OD_IOP_MMHG: 19
OS_IOP_MMHG: 12

## 2021-01-01 ASSESSMENT — ENCOUNTER SYMPTOMS
NAUSEA: 0
SPEECH DIFFICULTY: 0
SINUS PRESSURE: 0
EYE PAIN: 0
HEADACHES: 0
APPETITE CHANGE: 0
TROUBLE SWALLOWING: 0
CHILLS: 0
NUMBNESS: 0
DECREASED CONCENTRATION: 0
FEVER: 0
VOICE CHANGE: 0
SHORTNESS OF BREATH: 0
ACTIVITY CHANGE: 0
EYE DISCHARGE: 0
BRUISES/BLEEDS EASILY: 0
WEAKNESS: 0
LIGHT-HEADEDNESS: 0
SEIZURES: 1
DYSPHORIC MOOD: 0

## 2021-01-01 ASSESSMENT — CUP TO DISC RATIO
OD_RATIO: 0.2
OS_RATIO: 0.3
OD_RATIO: 0.2
OS_RATIO: 0.2
OS_RATIO: 0.4
OD_RATIO: 0.4
OS_RATIO: 0.2
OS_RATIO: 0.3

## 2021-01-01 ASSESSMENT — MIFFLIN-ST. JEOR
SCORE: 1171.44
SCORE: 1190.94

## 2021-01-01 ASSESSMENT — PAIN SCALES - GENERAL
PAINLEVEL: NO PAIN (0)
PAINLEVEL: NO PAIN (0)

## 2021-01-01 ASSESSMENT — PATIENT HEALTH QUESTIONNAIRE - PHQ9
SUM OF ALL RESPONSES TO PHQ QUESTIONS 1-9: 21
10. IF YOU CHECKED OFF ANY PROBLEMS, HOW DIFFICULT HAVE THESE PROBLEMS MADE IT FOR YOU TO DO YOUR WORK, TAKE CARE OF THINGS AT HOME, OR GET ALONG WITH OTHER PEOPLE: EXTREMELY DIFFICULT

## 2021-01-06 NOTE — PROGRESS NOTES
Assessment & Plan     Alexa Kline is a 60 year old female with the following diagnoses:   1. Orbital lesion - Right Eye    2. Visual field defect    3. Optic atrophy       60 year old woman presented for follow up on right atypical optic neuritis . She had this ring enhancing lesion on her right optic nerve.  Lab work up unremarkable. She was last seen in 11/4/2020, back then her optic neuritis responded to steroids. She is currently off steroids (last dose was in mid December 2020). She reports that her peripheral vision is better in the right eye.    Lab work : AQP4, MOG, lyme, treponema, Anti-nuclear antibody, ace, cysticercosis all unremarkable.        Her visual acuity is 20/20 in both eye with no APD. Color plates are full. Altitudinal VF loss in the right eye is better today. OCT optic nerves showed RNFL superiorly in both eyes that is worsened in the right eye    She repeated MRI orbit today.  I reviewed the MRI personally with the patient. she still has ring enhancement that looks improved to my eye and hers.      My impression is that she still has a ring enhancement around the right optic nerve that is better compared to last MRI orbit. Her vision did not worsen off steroid and has improved with regard to the visual field. We will continue to monitor for now.  I would hold on repeat MRI for now.  Follow up 4-6 months sooner as needed for worsening symptoms.             Attending Physician Attestation:  Complete documentation of historical and exam elements from today's encounter can be found in the full encounter summary report (not reduplicated in this progress note).  I personally obtained the chief complaint(s) and history of present illness.  I confirmed and edited as necessary the review of systems, past medical/surgical history, family history, social history, and examination findings as documented by others; and I examined the patient myself.  I personally reviewed the relevant tests,  images, and reports as documented above.  I formulated and edited as necessary the assessment and plan and discussed the findings and management plan with the patient and family. I personally reviewed the ophthalmic test(s) associated with this encounter, agree with the interpretation(s) as documented by the resident/fellow, and have edited the corresponding report(s) as necessary.  - Saul Veloz MD  Neuro-ophthalmology fellow  [facility]

## 2021-01-06 NOTE — NURSING NOTE
Chief Complaints and History of Present Illnesses   Patient presents with     Follow Up     2 month f/u Orbital lesion - Right Eye     Chief Complaint(s) and History of Present Illness(es)     Follow Up     Laterality: right eye    Associated symptoms: Negative for eye pain, double vision, flashes, floaters and headache    Comments: 2 month f/u Orbital lesion - Right Eye              Comments     Patient notes that her VA is good, she has no new concerns today.     Jessica LUBIN January 6, 2021 8:52 AM

## 2021-02-13 NOTE — ED NOTES
Bed: ED24  Expected date: 2/13/21  Expected time:   Means of arrival:   Comments:  From optho with right eye vision decrease x 5 days. Hx of optic neuritis. Needs MRI of orbits w and w/o contrast. Call optho after also.     Jose Ramon Steiner MD  02/13/21 1034

## 2021-02-13 NOTE — DISCHARGE INSTRUCTIONS
TODAY'S VISIT:  You were seen today for vision changes  -   - If you had any labs or imaging/radiology tests performed today, you should also discuss these tests with your usual provider.     FOLLOW-UP:  Please make an appointment to follow up with:  - Your Primary Care Provider. If you do not have a PCP, please call the Primary Care Center (phone: [phone] for an appointment  - Eye Clinic (phone: [phone])     - Have your provider review the results from today's visit with you again to make sure no further follow-up or additional testing is needed based on those results.     PRESCRIPTIONS / MEDICATIONS:  - prednisone    RETURN TO THE EMERGENCY DEPARTMENT  Return to the Emergency Department at any time for any new or worsening symptoms or any concerns.

## 2021-02-13 NOTE — TELEPHONE ENCOUNTER
Spoke with patient, she is having new onset difficulty focusing when reading with her right eye, painful pulling sensation with extraocular movement. Patient agrees to see us in clinic tomorrow morning 9 AM [facility] 2/13.

## 2021-02-13 NOTE — ED TRIAGE NOTES
Patient presents ambulatory to triage with c/o eye problem. Patient reports decreased vision in right eye for the past five days. Patient reports recurring issues with optic neuritis since September. Patient presents from eye clinic for MRI.

## 2021-02-13 NOTE — ED PROVIDER NOTES
[place] EMERGENCY DEPARTMENT ([facility])  2/13/21    History     Chief Complaint   Patient presents with     Eye Problem     HPI  Alexa Kline is a 60 year old female with a history of atypical optic neuritis, endometrial cancer who presents to the Emergency Department with decreased vision in the right eye. Patient was seen by ophthalmology this morning for these symptoms. Patient was sent here to the ED for an MRI of orbits.  Patient notes last fall she had optic neuritis treated with steroids and improved has been doing well.  Over last 5 days started noticing changes minimally in the lower peripheral area now is describing multiple blind spots and with her eyes closed she sees a kaleidoscope of varying displays.  No headache no left eye changes no other neuro changes.  Symptoms seem similar to when she had optic neuritis in the past before.    Past Medical History  No past medical history on file.  Past Surgical History:   Procedure Laterality Date     REPAIR PTOSIS Bilateral           predniSONE (DELTASONE) 20 MG tablet       ALBUTEROL IN       biotin 1000 MCG TABS tablet       calcium 600-200 MG-UNIT TABS       cetirizine (ZYRTEC) 10 MG tablet       cycloSPORINE (RESTASIS) 0.05 % ophthalmic emulsion       linaclotide (LINZESS) 145 MCG capsule       Melatonin-Pyridoxine (MELATONEX PO)       mometasone (NASONEX) 50 MCG/ACT nasal spray       Multiple Vitamins-Minerals (MULTIVITAMIN ADULT PO)       naproxen sodium 220 MG capsule       naproxen sodium 220 MG capsule       Omega-3 Fatty Acids (FISH OIL) 1200 MG capsule       Vitamin D, Cholecalciferol, 25 MCG (1000 UT) TABS      No Known Allergies  Family History  Family History   Problem Relation Age of Onset     Glaucoma No family hx of      Macular Degeneration No family hx of      Social History   Social History     Tobacco Use     Smoking status: Never Smoker     Smokeless tobacco: Never Used   Substance Use Topics     Alcohol use: Not on file  "    Drug use: Not on file      Past medical history, past surgical history, medications, allergies, family history, and social history were reviewed with the patient. No additional pertinent items.       Review of Systems   Constitutional: Negative for activity change, appetite change, chills and fever.   HENT: Negative for sinus pressure, trouble swallowing and voice change.    Eyes: Positive for visual disturbance (right eye). Negative for pain and discharge.   Respiratory: Negative for shortness of breath.    Cardiovascular: Negative for chest pain.   Gastrointestinal: Negative for nausea.   Skin: Negative for rash.   Neurological: Negative for speech difficulty, weakness, light-headedness, numbness and headaches.   Hematological: Does not bruise/bleed easily.   Psychiatric/Behavioral: Negative for decreased concentration and dysphoric mood.   All other systems reviewed and are negative.    A complete review of systems was performed with pertinent positives and negatives noted in the HPI, and all other systems negative.    Physical Exam   BP: 119/50  Pulse: 67  Temp: 97.6  F (36.4  C)  Resp: 16  Height: 167.6 cm (5' 6\")  Weight: 58.5 kg (128 lb 14.4 oz)(standing scale)  SpO2: 100 %  Physical Exam  Vitals signs and nursing note reviewed.   Constitutional:       General: She is in acute distress.      Appearance: She is well-developed. She is not ill-appearing or diaphoretic.      Comments: Patient very pleasant here describing symptoms as not writhing in pain   HENT:      Head: Normocephalic and atraumatic.      Mouth/Throat:      Mouth: Mucous membranes are moist.   Eyes:      General: No scleral icterus.     Extraocular Movements: Extraocular movements intact.      Conjunctiva/sclera: Conjunctivae normal.      Pupils: Pupils are equal, round, and reactive to light.      Comments: Decreased vision right eye   Neck:      Musculoskeletal: Normal range of motion and neck supple.   Cardiovascular:      Rate and " Rhythm: Normal rate.   Pulmonary:      Effort: No respiratory distress.   Abdominal:      Tenderness: There is no guarding.   Skin:     General: Skin is warm and dry.      Capillary Refill: Capillary refill takes less than 2 seconds.      Coloration: Skin is not pale.      Findings: No erythema or rash.   Neurological:      General: No focal deficit present.      Mental Status: She is alert and oriented to person, place, and time.      Comments: Decreased vision perception right eye   Psychiatric:         Mood and Affect: Mood normal.         Behavior: Behavior normal.         Thought Content: Thought content normal.         Judgment: Judgment normal.           ED Course         I discussed regarding patient prior to patient coming over with ophthalmology.  Patient arrived and assessed as noted IV established labs drawn.  CBC chemistries were reviewed with normal limits CRP and sed rate were also negative.  These were reviewed by myself also noted below.    MRI of the orbits with and without contrast were ordered.    Findings at this point preliminary show some mild changes of optic neuritis in the right.  No other major changes noted.  Discussed with ophthalmology who is going to review with neuro-ophthalmology the MRI and make recommendations regarding steroid treatment.  We are waiting for this I did sign out after I did inform the patient of our plan which she agrees.  I signed out to the evening physician who will coordinate with ophthalmology recommendations for treatment of most likely optic neuritis recurrent.    Procedures                         Results for orders placed or performed during the hospital encounter of 02/13/21   MR Orbit w/o & w Contrast     Status: None    Narrative    MR ORBIT W/O & W CONTRAST 2/13/2021 2:04 PM    Provided History:  right vision decrease with hx of optic neuritis  eval for recurrence per optho  ICD-10:    Comparison:  MRI 1/8/2021    Technique:    1. MRI of the Brain:   Sagittal T1-weighted, axial turboFLAIR and axial  diffusion-weighted with ADC map images of the brain were obtained  without intravenous contrast.  After intravenous administration of  gadolinium, axial T1-weighted images of the brain were obtained.    2. MRI of the Orbits focused on the orbits/visual pathways:  Axial  T2-weighted with inversion recovery and coronal T1-weighted images  were obtained without intravenous contrast. Axial and coronal  T1-weighted images with fat saturation were obtained after intravenous  gadolinium administration.    Contrast: 5.8CC GADAVIST    Findings:     Slightly increased conspicuity of T2 hyperintense peripherally  enhancing focus in the intraorbital right optic nerve measuring 0.8 x  0.5 cm, previously measuring 0.7 x 0.5 cm. The left optic nerve  appears unremarkable.. No mass effect. No midline shift. No abnormal  extra-axial fluid collection. The ventricles and sulci are normal for  age.  Normal intravascular flow voids.        Impression    Impression:    1. Little change in the peripherally enhancing right optic nerve  lesion, nearly appearing cystic centrally, which does not have the  classic appearance of optic neuritis. Differences in findings may be  secondary to technique.  2. No acute abnormalities. The left optic nerve appears unremarkable..    I have personally reviewed the examination and initial interpretation  and I agree with the findings.    COLLINS SCOTT MD   CBC with platelets differential     Status: None   Result Value Ref Range    WBC 4.5 4.0 - 11.0 10e9/L    RBC Count 4.87 3.8 - 5.2 10e12/L    Hemoglobin 14.3 11.7 - 15.7 g/dL    Hematocrit 43.6 35.0 - 47.0 %    MCV 90 78 - 100 fl    MCH 29.4 26.5 - 33.0 pg    MCHC 32.8 31.5 - 36.5 g/dL    RDW 11.9 10.0 - 15.0 %    Platelet Count 329 150 - 450 10e9/L    Diff Method Automated Method     % Neutrophils 49.2 %    % Lymphocytes 38.4 %    % Monocytes 9.1 %    % Eosinophils 2.4 %    % Basophils 0.7 %    %  Immature Granulocytes 0.2 %    Nucleated RBCs 0 0 /100    Absolute Neutrophil 2.2 1.6 - 8.3 10e9/L    Absolute Lymphocytes 1.7 0.8 - 5.3 10e9/L    Absolute Monocytes 0.4 0.0 - 1.3 10e9/L    Absolute Eosinophils 0.1 0.0 - 0.7 10e9/L    Absolute Basophils 0.0 0.0 - 0.2 10e9/L    Abs Immature Granulocytes 0.0 0 - 0.4 10e9/L    Absolute Nucleated RBC 0.0    Erythrocyte sedimentation rate auto     Status: None   Result Value Ref Range    Sed Rate 4 0 - 30 mm/h   CRP inflammation     Status: None   Result Value Ref Range    CRP Inflammation <2.9 0.0 - 8.0 mg/L   Partial thromboplastin time     Status: None   Result Value Ref Range    PTT 27 22 - 37 sec   Comprehensive metabolic panel     Status: Abnormal   Result Value Ref Range    Sodium 138 133 - 144 mmol/L    Potassium 3.4 3.4 - 5.3 mmol/L    Chloride 103 94 - 109 mmol/L    Carbon Dioxide 30 20 - 32 mmol/L    Anion Gap 5 3 - 14 mmol/L    Glucose 88 70 - 99 mg/dL    Urea Nitrogen 9 7 - 30 mg/dL    Creatinine 0.58 0.52 - 1.04 mg/dL    GFR Estimate >90 >60 mL/min/[1.73_m2]    GFR Estimate If Black >90 >60 mL/min/[1.73_m2]    Calcium 9.4 8.5 - 10.1 mg/dL    Bilirubin Total 0.5 0.2 - 1.3 mg/dL    Albumin 5.2 (H) 3.4 - 5.0 g/dL    Protein Total 8.9 (H) 6.8 - 8.8 g/dL    Alkaline Phosphatase 60 40 - 150 U/L    ALT 34 0 - 50 U/L    AST 33 0 - 45 U/L     Medications   lidocaine 1 % 0.1-1 mL (has no administration in time range)   lidocaine (LMX4) cream (has no administration in time range)   sodium chloride (PF) 0.9% PF flush 3 mL (has no administration in time range)   sodium chloride (PF) 0.9% PF flush 3 mL (has no administration in time range)   gadobutrol (GADAVIST) injection 5.85 mL (5.8 mLs Intravenous Given 2/13/21 1322)   predniSONE (DELTASONE) tablet 100 mg (100 mg Oral Given 2/13/21 2027)        Assessments & Plan (with Medical Decision Making)  60-year-old female presents with decreased right vision similar to when she had optic neuritis last fall.  Patient has had 5  days of symptoms no other neuro focal findings seen by ophthalmology in the clinic sent for MRI of the orbits which were done.  Labs otherwise stable including inflammatory markers findings show some mild changes consistent with mild optic neuritis of the right eye.  MRI to be reviewed by neuro ophthalmology.  Signed out to evening physician who will talk to ophthalmology regarding the final read of the MRI and recommendations.           I have reviewed the nursing notes. I have reviewed the findings, diagnosis, plan and need for follow up with the patient.    Discharge Medication List as of 2/13/2021  5:10 PM          Final diagnoses:   Changes in vision - right eye   Optic neuritis       --  Jose Ramon Steiner MD  [facility] EMERGENCY DEPARTMENT  2/13/2021    This note was created at least in part by the use of dragon voice dictation system. Inadvertent typographical errors may still exist.  Jose Ramon Steiner MD.    Patient evaluated in the emergency department during the COVID-19 pandemic period. Careful attention to patients safety was addressed throughout the evaluation. Evaluation and treatment management was initiated with disposition made efficiently and appropriate as possible to minimize any risk of potential exposure to patient during this evaluation.       Jose Ramon Steiner MD  02/13/21 5071

## 2021-02-13 NOTE — ED NOTES
"     Emergency Department Patient Sign-out       Brief HPI:  This is a 60 year old female signed out to me by Dr. Steiner .  See initial ED Provider note for details of the presentation.            Significant Events prior to my assuming care: The patient is a 60-year-old female with past medical history of optic neuritis who presents to the emergency department with vision changes.  MRI shows findings suspicious for recurrence of optic neuritis.  Ophthalmology has been consulted and is planning to discuss the patient with neuro ophthalmology team.  They will call back with recommendations.  Patient does have IV in place, will likely need dose of IV steroids and discharge home with course of steroids, but this will depend on final recommendations from ophthalmology team.      Exam:   Patient Vitals for the past 24 hrs:   BP Temp Temp src Pulse Resp SpO2 Height Weight   02/13/21 1515 123/77 -- -- -- -- 100 % -- --   02/13/21 1035 119/50 97.6  F (36.4  C) Oral 67 16 100 % 1.676 m (5' 6\") 58.5 kg (128 lb 14.4 oz)           ED RESULTS:   Results for orders placed or performed during the hospital encounter of 02/13/21 (from the past 24 hour(s))   CBC with platelets differential     Status: None    Collection Time: 02/13/21 11:15 AM   Result Value Ref Range    WBC 4.5 4.0 - 11.0 10e9/L    RBC Count 4.87 3.8 - 5.2 10e12/L    Hemoglobin 14.3 11.7 - 15.7 g/dL    Hematocrit 43.6 35.0 - 47.0 %    MCV 90 78 - 100 fl    MCH 29.4 26.5 - 33.0 pg    MCHC 32.8 31.5 - 36.5 g/dL    RDW 11.9 10.0 - 15.0 %    Platelet Count 329 150 - 450 10e9/L    Diff Method Automated Method     % Neutrophils 49.2 %    % Lymphocytes 38.4 %    % Monocytes 9.1 %    % Eosinophils 2.4 %    % Basophils 0.7 %    % Immature Granulocytes 0.2 %    Nucleated RBCs 0 0 /100    Absolute Neutrophil 2.2 1.6 - 8.3 10e9/L    Absolute Lymphocytes 1.7 0.8 - 5.3 10e9/L    Absolute Monocytes 0.4 0.0 - 1.3 10e9/L    Absolute Eosinophils 0.1 0.0 - 0.7 10e9/L    Absolute " Basophils 0.0 0.0 - 0.2 10e9/L    Abs Immature Granulocytes 0.0 0 - 0.4 10e9/L    Absolute Nucleated RBC 0.0    Erythrocyte sedimentation rate auto     Status: None    Collection Time: 02/13/21 11:15 AM   Result Value Ref Range    Sed Rate 4 0 - 30 mm/h   CRP inflammation     Status: None    Collection Time: 02/13/21 11:15 AM   Result Value Ref Range    CRP Inflammation <2.9 0.0 - 8.0 mg/L   Partial thromboplastin time     Status: None    Collection Time: 02/13/21 11:15 AM   Result Value Ref Range    PTT 27 22 - 37 sec   Comprehensive metabolic panel     Status: Abnormal    Collection Time: 02/13/21 11:15 AM   Result Value Ref Range    Sodium 138 133 - 144 mmol/L    Potassium 3.4 3.4 - 5.3 mmol/L    Chloride 103 94 - 109 mmol/L    Carbon Dioxide 30 20 - 32 mmol/L    Anion Gap 5 3 - 14 mmol/L    Glucose 88 70 - 99 mg/dL    Urea Nitrogen 9 7 - 30 mg/dL    Creatinine 0.58 0.52 - 1.04 mg/dL    GFR Estimate >90 >60 mL/min/[1.73_m2]    GFR Estimate If Black >90 >60 mL/min/[1.73_m2]    Calcium 9.4 8.5 - 10.1 mg/dL    Bilirubin Total 0.5 0.2 - 1.3 mg/dL    Albumin 5.2 (H) 3.4 - 5.0 g/dL    Protein Total 8.9 (H) 6.8 - 8.8 g/dL    Alkaline Phosphatase 60 40 - 150 U/L    ALT 34 0 - 50 U/L    AST 33 0 - 45 U/L   MR Orbit w/o & w Contrast     Status: None (Preliminary result)    Collection Time: 02/13/21  2:04 PM    Impression    Impression:    1. Subtle increase in conspicuity of peripherally enhancing right  optic nerve lesion. Differences in findings may be secondary to  technique.  2. No acute abnormalities. The left optic nerve appears unremarkable..         ED MEDICATIONS:   Medications   lidocaine 1 % 0.1-1 mL (has no administration in time range)   lidocaine (LMX4) cream (has no administration in time range)   sodium chloride (PF) 0.9% PF flush 3 mL (has no administration in time range)   sodium chloride (PF) 0.9% PF flush 3 mL (has no administration in time range)   gadobutrol (GADAVIST) injection 5.85 mL (5.8 mLs  Intravenous Given 2/13/21 1322)         Impression:    ICD-10-CM    1. Changes in vision  H53.9     right eye   2. Optic neuritis  H46.9        Plan:    Pending ophthalmology recommendations    Patient was discussed with ophthalmology, they recommend 100 mg of prednisone daily for 3 days, followed by 80 mg daily until follow-up with neuro-ophthalmology on Tuesday (they will call to schedule an appointment for the patient).  We will give initial dose of prednisone here in the emergency department.    6403 Patient to be discharged home. Advised to follow up with ophthalmology as directed. To return to ER immediately with any new/worsening symptoms. Plan of care discussed with patient who expresses understanding and agrees with plan of care.    MD Rudy Jackson Michelle C, MD  02/13/21 5730

## 2021-02-15 NOTE — TELEPHONE ENCOUNTER
Spoke to patient to assist with scheduling on Wednesday with Dr. Brumfield per Dr. Wallace after ED visit.     Kristi Flores on 2/15/2021 at 9:46 AM

## 2021-02-17 PROBLEM — H05.89 ORBITAL LESION: Status: ACTIVE | Noted: 2021-01-01

## 2021-02-17 NOTE — TELEPHONE ENCOUNTER
Called patient to schedule procedure with Dr. Matos, there was no answer.  Left message with my direct line [phone].

## 2021-02-17 NOTE — TELEPHONE ENCOUNTER
Spoke with patient to schedule surgery with Dr. Matos    Surgery was scheduled on 2/25 at [facility]  Patient will have H&P at [facility] [place]     Patient is aware a COVID-19 test is needed before their procedure. The test should be with-in 4 days of their procedure.   Test Details: Date 2/23 Location [facility]    Post-Op visit was scheduled on 3/9  Patient is aware a / is needed day of surgery.   Surgery packet was mailed 2/17, patient has my direct contact information for any further questions.

## 2021-02-17 NOTE — PROGRESS NOTES
Assessment & Plan     Alexa Kline is a 60 year old female with the following diagnoses:   1. Optic perineuritis         Alexa Kline is a 60 year old female with a history of atypical optic neuritis diagnosed in the fall of 2020. She had a ring enhancing lesion of her right optic nerve and lab workup was unremarkable. She received high dose po steroids for 3 days with a slow steroid taper ending mid November 2020. She was recently evaluated in neuro-ophthalmology clinic on 1/6/21 at that time altitudinal VF defect in right eye was improved, no APD and VA was 20/20. She had a repeat MRI that day with improvement in the ring enhancement of right optic nerve    On 2/10/21 she describes new vision changes in right eye that were progressive. She notices that she doesn't see all the words on a page.  In upper left quadrant would be all blurred out and other areas bolder.   She describes that the upper left of her vision in right eye is difficult to see and she has to shift the book to the right and a little bit down to bring it into focus. She describes pain in extreme positions of gaze. No flashes but she has stable floaters. MRI orbit showed mild increase in the enhancement of the cystic lesion in the right optic nerve. She was started on 100 mg prednisone for 3 day that was decreased to 80 mg.  She feels it is improved since starting the prednisone.  Weight 129 pounds     MR ORBIT W/O & W CONTRAST 2/13/2021    Impression:    1. Little change in the peripherally enhancing right optic nerve  lesion, nearly appearing cystic centrally, which does not have the  classic appearance of optic neuritis. Differences in findings may be secondary to technique.  2. No acute abnormalities. The left optic nerve appears unremarkable    Visual acuity 20/20 RIGHT eye and 20/20 left eye.  Color vision normal both eyes.  Pupils normal with afferent pupillary defect RIGHT eye.      I reviewed her MRI images personally, there  is worsening of the ring enhancing lesion with regards to intensity on the February MRI compared to the January MRI.  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  We discussed prolonged steroid taper, steroid sparing agents, and biopsy.  The lesion never went away in January.  She really wants to know what this is before we treat it.  We will send her back to Dr. Matos to discuss biopsy. I will discuss with Dr. Matos timing of coming off the prednsone.  I sent him in epic in basket and called him to leave a message.  She understands that the biopsy could result in blindness, but would prefer a diagnosis.          Attending Physician Attestation:  Complete documentation of historical and exam elements from today's encounter can be found in the full encounter summary report (not reduplicated in this progress note).  I personally obtained the chief complaint(s) and history of present illness.  I confirmed and edited as necessary the review of systems, past medical/surgical history, family history, social history, and examination findings as documented by others; and I examined the patient myself.  I personally reviewed the relevant tests, images, and reports as documented above.  I formulated and edited as necessary the assessment and plan and discussed the findings and management plan with the patient and family. I personally reviewed the ophthalmic test(s) associated with this encounter, agree with the interpretation(s) as documented by the resident/fellow, and have edited the corresponding report(s) as necessary.  - Saul Veloz MD  Neuro-ophthalmology fellow  [facility]

## 2021-02-17 NOTE — NURSING NOTE
Chief Complaints and History of Present Illnesses   Patient presents with     Follow Up     Visual field defect.  Orbital lesion - Right Eye.     Chief Complaint(s) and History of Present Illness(es)     Follow Up     Laterality: right eye    Onset: gradual    Onset: years ago    Course: gradually improving    Associated symptoms: dryness and floaters.  Negative for eye pain, tearing, flashes, photophobia and double vision    Treatments tried: eye drops    Response to treatment: mild improvement    Pain scale: 0/10    Comments: Visual field defect.  Orbital lesion - Right Eye.              Comments     Pt states vision is better since last visit.  Vision got worse last week.  She saw Dr Wallace on Saturday and was prescribed Prednisone.  Her vision has gradually improved since then.  Floaters are more active today than normal starting maybe a week ago.    Restasis BE 2x day.    TRISTIAN Panchal February 17, 2021 8:50 AM

## 2021-02-17 NOTE — TELEPHONE ENCOUNTER
Patient wanted to know what to do with steroids.  Dr. Brumfield says patient can stop steroids and undergo biopsy 2/25.     Kristi Flores on 2/17/2021 at 3:21 PM

## 2021-02-18 NOTE — Clinical Note
February 18, 2021       TO: Alexa Kline  [address] [address]       DearMs.Raisa,    We are writing to inform you of your test results.    {[facility] results letter list:[id number]}    No results found from the In Basket message.    ***

## 2021-02-18 NOTE — LETTER
2021         RE:  :  MRN: Alexa Kline  1960  [id number]     Dear Dr. Brumfield,    Thank you for asking me to see your patient, Alexa Kline, for an oculoplastic   consultation.  My assessment and plan are below.  For further details, please see my attached clinic note.      Problem List Items Addressed This Visit     Orbital lesion      Other Visit Diagnoses     Optic perineuritis    -  Primary    Visual field defect             We again discussed options including repeat treatment with steroids, steroid sparing agents, or proceeding with biopsy. Given the atypical appearance on imaging and worsening over the past several months with increased enhancement biopsy is certainly reasonable. There is a risk that we get a nondiagnostic specimen. There is a risk of severe postoperative vision loss and she is aware of this. Other risks include a droopy eyelid, double vision, change in pupil size, scar, need for more surgery, need for different modalities of treatment. Of course there is always the systemic risks of anesthesia as well.    She would like to proceed with optic nerve sheath biopsy. She stopped steroids yesterday. We have added her to the schedule for next Thursday.    {Provider  Link to [facility] Help Grid :    No follow-ups on file.    Morelia Matos MD  [facility] OPHTHALMOLOGY [facility] [place]        Again, thank you for allowing me to participate in the care of your patient.      Sincerely,    Morelia Matos MD  Department of Ophthalmology and Visual Neurosciences  [facility]    CC: Saul Brumfield MD  [address] [place] [id number]  [facility] [address]  Via In Basket

## 2021-02-18 NOTE — PROGRESS NOTES
Alexa is a 60 year old who is being evaluated via a billable telephone visit.      What phone number would you like to be contacted at? [phone]  How would you like to obtain your AVS? [facility]    Assessment & Plan   Problem List Items Addressed This Visit     Orbital lesion      Other Visit Diagnoses     Optic perineuritis    -  Primary    Visual field defect             We again discussed options including repeat treatment with steroids, steroid sparing agents, or proceeding with biopsy. Given the atypical appearance on imaging and worsening over the past several months with increased enhancement biopsy is certainly reasonable. There is a risk that we get a nondiagnostic specimen. There is a risk of severe postoperative vision loss and she is aware of this. Other risks include a droopy eyelid, double vision, change in pupil size, scar, need for more surgery, need for different modalities of treatment. Of course there is always the systemic risks of anesthesia as well.    She would like to proceed with optic nerve sheath biopsy. She stopped steroids yesterday. We have added her to the schedule for next Thursday.    {Provider  Link to [facility] Help Grid :    No follow-ups on file.    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 She has had an extensive laboratory work-up. This has been unrevealing. She had imaging, which revealed a peripherally enhancing optic nerve or nerve sheath lesion. She was started on high-dose prednisone and symptomatically improved. Given how atypical her lesion looked biopsy was offered, but given her dramatic improvement with steroids this was not done at the time. Upon tapering prednisone over the past several days she has started to notice changes to the vision  in the right eye again. A new MRI was obtained which revealed increased enhancement of the same ring-enhancing lesion on the right side. She saw Dr. Brumfield yesterday who discussed with her options for management including steroids, steroid sparing agents, biopsy. After going through the risks and benefits of each, she decided she would like to discuss biopsy with me again. She was started on steroids over the weekend, at 100 mg for 3 days then decrease to 80 mg. Given she decided to proceed with a biopsy decision was made to stop the steroids yesterday to maximize yield from histopathology.      Review of Systems     Review of systems negative except as documented in the chart.    Objective         Vitals:  No vitals were obtained today due to virtual visit.    Physical Exam   healthy, alert and no distress  PSYCH: Alert and oriented times 3; coherent speech, normal   rate and volume, able to articulate logical thoughts, able   to abstract reason, no tangential thoughts, no hallucinations   or delusions  Her affect is normal  RESP: No cough, no audible wheezing, able to talk in full sentences  Remainder of exam unable to be completed due to telephone visits    I reviewed examination with neuro-ophtalmology dated 2/17/2021. Of note acuity was 20/20 both eyes and normal color, but there was an afferent pupillary defect.     Attending Physician Attestation: Complete documentation of historical and exam elements from today's encounter can be found in the full encounter summary report (not reduplicated in this progress note). I personally obtained the chief complaint(s) and history of present illness. I confirmed and edited as necessary the review of systems, past medical/surgical history, family history, social history, and examination findings as documented by others; and I examined the patient myself. I personally reviewed the relevant tests, images, and reports as documented above. I formulated and edited as necessary  the assessment and plan and discussed the findings and management plan with the patient and family.  -Morelia Matos MD          Phone call duration: 15 minutes

## 2021-02-25 NOTE — ANESTHESIA CARE TRANSFER NOTE
Patient: Alexa Kline    Procedure(s):  Right optic nerve sheath biopsy    Diagnosis: Orbital lesion [H05.89]  Diagnosis Additional Information: No value filed.    Anesthesia Type:   General     Note:    Oropharynx: oropharynx clear of all foreign objects  Level of Consciousness: awake  Oxygen Supplementation: nasal cannula  Level of Supplemental Oxygen (L/min / FiO2): 3  Independent Airway: airway patency satisfactory and stable  Dentition: dentition unchanged  Vital Signs Stable: post-procedure vital signs reviewed and stable  Report to RN Given: handoff report given  Patient transferred to: PACU  Comments: VSS and WNL, no PONV, report to Lillie RN  Handoff Report: Identifed the Patient, Identified the Reponsible Provider, Reviewed the pertinent medical history, Discussed the surgical course, Reviewed Intra-OP anesthesia mangement and issues during anesthesia, Set expectations for post-procedure period and Allowed opportunity for questions and acknowledgement of understanding      Vitals: (Last set prior to Anesthesia Care Transfer)  CRNA VITALS  2/25/2021 1158 - 2/25/2021 1233      2/25/2021             Pulse:  58    SpO2:  98 %    Resp Rate (observed):  (!) 6        Electronically Signed By: OMID Woody CRNA  February 25, 2021  12:33 PM

## 2021-02-25 NOTE — ANESTHESIA POSTPROCEDURE EVALUATION
Patient: Alexa Kline    Procedure(s):  Right optic nerve sheath biopsy    Diagnosis:Orbital lesion [H05.89]  Diagnosis Additional Information: No value filed.    Anesthesia Type:  General    Note:  Disposition: Outpatient   Postop Pain Control: Uneventful            Sign Out: Well controlled pain   PONV: No   Neuro/Psych: Uneventful            Sign Out: Acceptable/Baseline neuro status   Airway/Respiratory: Uneventful            Sign Out: Acceptable/Baseline resp. status   CV/Hemodynamics: Uneventful            Sign Out: Acceptable CV status   Other NRE: NONE   DID A NON-ROUTINE EVENT OCCUR? No         Last vitals:  Vitals:    02/25/21 1252 02/25/21 1300 02/25/21 1330   BP:  116/68 114/71   Pulse: 56 58 63   Resp: 14 16 14   Temp: 36.3  C (97.3  F)  36.3  C (97.4  F)   SpO2: 98% 97% 99%       Last vitals prior to Anesthesia Care Transfer:  CRNA VITALS  2/25/2021 1158 - 2/25/2021 1258      2/25/2021             Pulse:  58    SpO2:  98 %    Resp Rate (observed):  (!) 6          Electronically Signed By: Loi Gomes DO  February 25, 2021  2:35 PM

## 2021-02-25 NOTE — ANESTHESIA PREPROCEDURE EVALUATION
Anesthesia Pre-Procedure Evaluation    Patient: Alexa Kline   MRN: [id number] : 1960        Preoperative Diagnosis: Orbital lesion [H05.89]   Procedure : Procedure(s):  Right optic nerve sheath biopsy     History reviewed. No pertinent past medical history.   Past Surgical History:   Procedure Laterality Date     REPAIR PTOSIS Bilateral       No Known Allergies   Social History     Tobacco Use     Smoking status: Never Smoker     Smokeless tobacco: Never Used   Substance Use Topics     Alcohol use: Not on file      Wt Readings from Last 1 Encounters:   21 58.5 kg (129 lb)        Anesthesia Evaluation   Pt has had prior anesthetic.         ROS/MED HX  ENT/Pulmonary: Comment: Bronchiectasis   Visual disturbance  Vitreous floater, right  Orbital lesion          Neurologic:     (+) seizures,     Cardiovascular:  - neg cardiovascular ROS     METS/Exercise Tolerance: >4 METS    Hematologic:  - neg hematologic  ROS     Musculoskeletal:  - neg musculoskeletal ROS     GI/Hepatic:  - neg GI/hepatic ROS     Renal/Genitourinary:  - neg Renal ROS     Endo:  - neg endo ROS     Psychiatric/Substance Use:  - neg psychiatric ROS     Infectious Disease:  - neg infectious disease ROS     Malignancy:  - neg malignancy ROS     Other:  - neg other ROS          Physical Exam    Airway  airway exam normal      Mallampati: I   TM distance: > 3 FB   Neck ROM: full   Mouth opening: > 3 cm    Respiratory Devices and Support         Dental  no notable dental history         Cardiovascular   cardiovascular exam normal       Rhythm and rate: regular and normal     Pulmonary   pulmonary exam normal        breath sounds clear to auscultation           OUTSIDE LABS:  CBC:   Lab Results   Component Value Date    WBC 4.5 2021    HGB 14.3 2021    HCT 43.6 2021     2021     BMP:   Lab Results   Component Value Date     2021    POTASSIUM 3.4 2021    POTASSIUM 4.1 09/10/2020    CHLORIDE  103 02/13/2021    CO2 30 02/13/2021    BUN 9 02/13/2021    CR 0.58 02/13/2021    CR 0.72 09/10/2020    GLC 88 02/13/2021    GLC 92 09/10/2020     COAGS:   Lab Results   Component Value Date    PTT 27 02/13/2021     POC: No results found for: BGM, HCG, HCGS  HEPATIC:   Lab Results   Component Value Date    ALBUMIN 5.2 (H) 02/13/2021    PROTTOTAL 8.9 (H) 02/13/2021    ALT 34 02/13/2021    AST 33 02/13/2021    ALKPHOS 60 02/13/2021    BILITOTAL 0.5 02/13/2021     OTHER:   Lab Results   Component Value Date    A1C 5.4 09/10/2020    DAI 9.4 02/13/2021    CRP <2.9 02/13/2021    SED 4 02/13/2021       Anesthesia Plan    ASA Status:  2   NPO Status:  NPO Appropriate    Anesthesia Type: General.     - Airway: LMA   Induction: Intravenous, Propofol.   Maintenance: Balanced.        Consents    Anesthesia Plan(s) and associated risks, benefits, and realistic alternatives discussed. Questions answered and patient/representative(s) expressed understanding.     - Discussed with:  Patient    Use of blood products discussed: No .     Postoperative Care    Pain management: Multi-modal analgesia, Oral pain medications.   PONV prophylaxis: Ondansetron (or other 5HT-3), Dexamethasone or Solumedrol     Comments:                Loi Gomes DO

## 2021-02-25 NOTE — OP NOTE
PREOPERATIVE DIAGNOSIS:  Atypical optic neuropathy with vision loss, right eye. Optic nerve sheath mass.     POSTOPERATIVE DIAGNOSIS:  Atypical optic neuropathy with vision loss, right eye. Optic nerve sheath mass.     PROCEDURE PERFORMED:  Optic nerve sheath biopsy, right eye      SURGEON:  Morelia Matos MD      ASSISTANTS: Jhony Fowler MD  and Milagro Wallace MD    ANESTHESIA:  General with local infiltration of a 50/50 mixture of 2% lidocaine with epinephrine and 0.5% Marcaine.      COMPLICATIONS:  None.      ESTIMATED BLOOD LOSS:  Less than 5 mL.      SPECIMEN:   ID Type Source Tests Collected by Time Destination   A : Right optic nerve sheath Tissue Other SURGICAL PATHOLOGY EXAM Morelia Matos MD 2/25/2021 12:17 PM    B : Right optic nerve sheath Tissue Other SURGICAL PATHOLOGY EXAM Morelia Matos MD 2/25/2021 12:19 PM        HISTORY:  Alexa Kline  presented with severe vision loss. Imaging revealed an atypical ring enhancing lesion involving either the optic nerve sheath or the nerve itself. This was responsive to high dose steroids. Unfortunately after tapering the steroids, she had recurrence of her visual symptoms to a lesser extent. Imaging revealed the mass to have gradually progressed. Given the atypical imaging findings, and clinical presentation, decision was made to biopsy the optic nerve sheath to rule out neoplastic, inflammatory, and infectious processes. After the risks, benefits and alternatives to the proposed procedure were explained to the patient, informed consent was obtained.      PROCEDURE:  The patient was brought to the operating room and placed supine on the operating table.  General anesthesia was induced.  The right upper lid crease was marked with a marking pen and infiltrated with local anesthetic.  The area was prepped and draped in the typical sterile ophthalmic fashion.  Attention was directed to the right side.  Lid crease incision was made with a 15 blade  and dissection carried down to the orbicularis with high temperature cautery.  Orbital septum was opened horizontally.  Dissection was carried into the nasal fat compartment and into the intraconal space using blunt dissection.  The optic nerve was identified. Malleable retractors over cottonoids were used to isolate the optic nerve. A myringotomy forcep was used to separate the optic nerve sheath from the nerve and a small window of sheath was excised with Yassargil neurosurgical scissors. A small gush of fluid was seen on incision in the nerve sheath. The nerve sheath appeared slightly inflamed. There was minimal bleeding. The optic nerve itself appeared abnormal, it appeared to be infiltrated, being thickened and had an irregular smooth surface. Given her relatively good preoperative vision, decision was made not to take a biopsy of the nerve parenchyma, but I did take a bit more of the surrounding sheath along with the superficial most aspect of the mass. There was minimal bleeding which was controlled without cautery. The skin was closed with running 6-0 plain gut suture.  Erythromycin ophthalmic ointment was applied.  The patient was taken to recovery room in stable condition. Vision was checked and she was easily able to count fingers.         Morelia Matos MD

## 2021-02-25 NOTE — BRIEF OP NOTE
[facility] Brief Operative Note    Pre-operative diagnosis: Orbital lesion [H05.89]   Post-operative diagnosis Same   Procedure: Procedure(s):  Right optic nerve sheath biopsy   Surgeon(s): Surgeon(s) and Role:     * Morelia Matos MD - Primary     * Martín Fowler MD - Fellow - Assisting   Estimated blood loss: 1mL    Specimens: ID Type Source Tests Collected by Time Destination   A : Right optic nerve sheath Tissue Other SURGICAL PATHOLOGY EXAM Morelia Matos MD 2/25/2021 12:17 PM       Findings: As expected

## 2021-02-25 NOTE — DISCHARGE INSTRUCTIONS
Post-operative Instructions  Ophthalmic Plastic and Reconstructive Surgery    Morelia Matos M.D.     All instructions apply to the operated eye(s) or eyelid(s).    Wound care and personal care  ? Apply ice compresses 15 minutes of every hour while awake for 2 days. As long as there is no further bleeding, after two days, switch to warm water compresses for five minutes, four times a day until seen by your physician.   ? You may shower or wash your hair the day after surgery. Do not go swimming for at least 2 weeks to prevent contamination of your wounds.  ? Do not apply make-up to the eyes or eyelids for 2 weeks after surgery.  ? Expect some swelling, bruising, black eye (even into the lower eyelids and cheeks). Also expect serum caking, crusting and discharge from the eye and/or incisions. A small amount of surface bleeding, and depending on the type of surgery, bleeding from the inside of the eyelid, is normal for the first 48 hours.  ? Avoid straining, bending at the waist, or lifting more than 15 pounds for 10 days. Activities that raise your blood pressure can worsen swelling, cause bleeding, and breaking of sutures. Like wise, sleeping with your head slightly elevated for the first several days can help swelling resolve more quickly.   ? Do continue to ambulate (walk) as you normally would - being sedentary after surgery can cause blood clots.   ? Your eye(s) and eyelid(s) may be painful and tender. This is normal after surgery.      Contact information and follow-up  ? Return to the Eye Clinic for a follow-up appointment with your physician as scheduled. If no appointment has been scheduled:   - [facility] eye clinic: [phone] for an appointment with Dr. Matos within 1 to 2 weeks from your date of surgery. If you are scheduled for a phone or video visit for your first postoperative appointment, please e-mail pictures to [email or web address] 1-2 days before your appointment.   -   [facility] eye clinic: [phone] for an appointment with Dr. Matos within 1 to 2 weeks from your date of surgery.     ? For severe pain, bleeding, or loss of vision, call the [facility] at [phone] or [facility] at [phone].     After hours or on weekends and holidays, call [phone] and ask to speak with the ophthalmologist on call.    An on call person can be reached after hours for concerns. The on call doctor should not call in medication refill requests after hours or on weekends, so please plan accordingly. An effort has been made to provide adequate pain medications following every surgery, and refills will not be provided in most instances.     Activity restrictions and driving  ? Avoid heavy lifting, bending, exercise or strenuous activity for 1 week after surgery.  You may resume other activities and return to work as tolerated.  ? You may not resume driving if you are using narcotic pain medications (such as Norco, Percocet, Tylenol #3).    Medications  ? Restart all regular home medications and eye drops. If you take Plavix or  Aspirin on a regular basis, wait for 72 hours after your surgery before restarting these in order to decrease the risk of bleeding complications.  ? Avoid aspirin and aspirin-like medications (Motrin, Aleve, Ibuprofen, [place] etc) for 72 hours to reduce the risk of bleeding. You may take Tylenol (acetaminophen) for pain.  ? In addition to your home medications, take the following post-operative medications as prescribed by your physician.    ? Apply antibiotic ointment to all sutures three times a day, and into the operated eye(s) at night.  ? In many cases, postoperative discomfort can be managed with Tylenol alone. If narcotic pain medication was prescribed, take pain pills at prescribed frequency as needed for pain.    ? WARNING: Most prescription pain medications contain Tylenol  (acetaminophen). You  must not take more than 4,000 mg of acetaminophen per  24-hour period. This is equivalent to 6 tablets of Darvocet, 12 tablets of Norco, Percocet or Tylenol #3. If you take other over-the-counter medications containing acetaminophen, you must take the amount of acetaminophen into account and reduce the number of prescribed pain pills accordingly.  ? Prescribed narcotic medications may make you drowsy. You must not drive a car, operate heavy machinery or drink alcohol while taking them.  ? Prescribed narcotic pain medications may cause constipation and nausea. Take them with some food to prevent a stomach upset.        [facility]  Home Care Following Anesthesia  For 24 hours after surgery:  1. Get plenty of rest.  A responsible adult must stay with you for at least 24 hours after you leave the surgery center.  2. Do not drive or use heavy equipment.  If you have weakness or tingling, don't drive or use heavy equipment until this feeling goes away.   3. Do not drink alcohol.   4. Avoid strenuous or risky activities.  Ask for help when climbing stairs.  5. You may feel lightheaded.  IF so, sit for a few minutes before standing.  Have someone help you get up.   6. If you have nausea (feel sick to your stomach): Drink only clear liquids such as apple juice, ginger ale, broth or 7-Up.  Rest may also help.  Be sure to drink enough fluids.  Move to a regular diet as you feel able.   7. You may have a slight fever.  Call the doctor if your fever is over 100 F (37.7 C) (taken under the tongue) or lasts longer than 24 hours.  8. You may have a dry mouth, a sore throat, muscle aches or trouble sleeping. These should go away after 24 hours.  9. Do not make important or legal decisions.               Tips for taking pain medications  To get the best pain relief possible, remember these points:    Take pain medications as directed, before pain becomes severe.    Pain medication can upset your  stomach: taking it with food may help.    Constipation is a common side effect of pain medication. Drink plenty of  fluids.    Eat foods high in fiber. Take a stool softener if recommended by your doctor or pharmacist.    Do not drink alcohol, drive or operate machinery while taking pain medications.    Ask about other ways to control pain, such as with heat, ice or relaxation.    Tylenol/Acetaminophen Consumption  To help encourage the safe use of acetaminophen, the makers of TYLENOL  have lowered the maximum daily dose for single-ingredient Extra Strength TYLENOL  (acetaminophen) products sold in the [place] from 8 pills per day (4,000 mg) to 6 pills per day (3,000 mg). The dosing interval has also changed from 2 pills every 4-6 hours to 2 pills every 6 hours.    If you feel your pain relief is insufficient, you may take Tylenol/Acetaminophen in addition to your narcotic pain medication.     Be careful not to exceed 3,000 mg of Tylenol/Acetaminophen in a 24 hour period from all sources.    If you are taking extra strength Tylenol/acetaminophen (500 mg), the maximum dose is 6 tablets in 24 hours.    If you are taking regular strength acetaminophen (325 mg), the maximum dose is 9 tablets in 24 hours.    Call a doctor for any of the followin. Signs of infection (fever, growing tenderness at the surgery site, a large amount of drainage or bleeding, severe pain, foul-smelling drainage, redness, swelling).  2. It has been over 8 to 10 hours since surgery and you are still not able to urinate (pass water).  3. Headache for over 24 hours.  4. Signs of Covid-19 infection (temperature over 100 degrees, shortness of breath, cough, loss of taste/smell, generalized body aches, persistent headache, chills, sore throat, nausea/vomiting/diarrhea)    Your doctor is:       Dr. Morelia Matos, Ophthalmology: [phone]               Or dial [phone] and ask for the resident on call for:  Ophthalmology  For emergency care,  call the:  [facility]:  [phone] (TTY for hearing impaired: [phone])

## 2021-02-26 NOTE — TELEPHONE ENCOUNTER
Dr. Veloz spoke to patient     Well demarcated visual field defect inferior.  Do not think it is inflammation and most likely related to surgery.  Dr. Veloz communicated with Dr. Matos and he will reach out to patient.      Kristi Flores on 2/26/2021 at 10:06 AM

## 2021-02-26 NOTE — TELEPHONE ENCOUNTER
Postoperative day 1 call to patient after speaking with Dr. Veloz. Patient noticed graying of her inferior visual field after waking up this morning. This was very similar to how her symptoms first presented but then improved with steroids.     I explained intraoperatively her nerve sheath was possibly a bit inflamed but the optic nerve parenchyma appeared to be infiltrated. I did try to get a very superficial biopsy of nerve infiltration, but also tried to minimize risk of severe vision loss. The area involved is on the superior aspect of her optic nerve so that would explain the initial symptoms as well as the post surgical changes.    I told her I don't think this is new inflammation, and I think the current steroid course we have her on is adequate. She is on 80 mg of prednisone and will taper by 10 mg weekly. I also told her I don't know what the prognosis will be, but it is possible the area of vision loss is permanent, or it may gradually improve.     Morelia Matos MD    Oculoplastic and Orbital Surgery   Department of Ophthalmology and Visual Neurosciences  [facility]

## 2021-02-26 NOTE — TELEPHONE ENCOUNTER
"Spoke to pt at 0848    S/p Optic nerve sheath biopsy right eye yesterday    Right eye peripheral visual field loss    Pt was off prednisone one week prior to biopsy and took 80 mg yesterday for first time after one week    This AM lower part of vision grayed out--noticed after waking up    No eye pain      Will review plan with neuro-ophthalmology team and call back    Geoffrey Ariza RN 8:55 AM 02/26/21                  [facility] Call Center    Phone Message    May a detailed message be left on voicemail: yes     Reason for Call: Symptoms or Concerns     If patient has red-flag symptoms, warm transfer to triage line    Current symptom or concern: Lower half of Rt eye vision is \"greyed out\"     Symptoms have been present for: 4 hour(s)    Has patient previously been seen for this? Yes    By Dr Matos    Date: Procedure yesterday 2/25    Are there any new or worsening symptoms? Yes: Second time this has happened. Wonders if her steroid dosage should be changed (reports it is currently 80mg/day). Pt can be reached at [phone]      Action Taken: Message routed to:  [facility] EYE    Travel Screening: Not Applicable                                                                        "

## 2021-03-01 NOTE — PATIENT INSTRUCTIONS
1. Continue Prednisone 80 mg daily until 3/4/2021, then decrease to 60 mg daily for 1 week, then 40 mg daily for 1 week. Then decrease by 10 mg weekly.     2. Start Vistaril (hydroxyzine) 25 mg by mouth at bedtime to help fall asleep. Do not take with benadryl.

## 2021-03-01 NOTE — PROGRESS NOTES
Assessment & Plan     Alexa Kline is a 60 year old female with the following diagnoses:   1. 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 She had a ring enhancing lesion of her right optic nerve and lab workup was unremarkable. She received high dose po steroids for 3 days with a slow steroid taper ending mid November 2020. She was recently evaluated in neuro-ophthalmology clinic on 1/6/21 at that time altitudinal VF defect in right eye was improved, no APD and VA was 20/20. She had a repeat MRI that day with improvement in the ring enhancement of right optic nerve.     On 2/10/21 she described new vision changes in right eye that were progressive. She notices that she doesn't see all the words on a page.  In upper left quadrant would be all blurred out and other areas bolder.   She describes that the upper left of her vision in right eye is difficult to see and she has to shift the book to the right and a little bit down to bring it into focus. She describes pain in extreme positions of gaze. No flashes but she has stable floaters. MRI orbit showed mild increase in the enhancement of the cystic lesion in the right optic nerve. She was started on 100 mg prednisone for 3 day that was decreased to 80 mg.  She feels it is improved since starting the prednisone.  Weight 129 pounds     Interval Update 03/01/21:  Immediately after the RIGHT optic nerve sheath biopsy on February 25, 2021, she woke up groggy and does not recall having a new or worsening scotoma. The next day when she woke up from sleep, she noticed a dense inferior scotoma with a tiny wedge in the central vision. She discussed this with Dr. Veloz and Dr. Matos, who felt this could be relate to the biopsy because they dissected the superior portion of the sheath and he did try to get a very superficial biopsy of the nerve infiltration that was  "visualized. The patient is concerned that it is related to reactivation of the optic perineuritis, because she had similar symptoms in the past. She restarted Prednisone 80 mg PO daily since 2/25/2021. Since she noticed the initial visual field defect, she reports it has been stable until today -- she thinks it has moved slightly more superiorly. She endorses stable pain with extraocular movements. She also has a new headache that wraps around the front of her scalp, better with Tylenol, constant, but progressively worsening for a few days to a slight degree. She did use ice packs immediately following the biopsy to help limit periorbital swelling. She describes the clarity of her vision as \"crappy\" and believes it is \"fuzier\" than before surgery but it has been stable since then. She also believes the left eye vision is slightly worse since the surgery.       Laboratory:  Leukemia/lymphoma evaluation from right optic nerve sheath biopsy:  Right optic nerve sheath:        Interpretation limited by low viability        Rare to absent viable B cells        Rare to absent viable T cells        See comment   This specimen has markedly decreased viability, which essentially   precludes meaningful evaluation    MR ORBIT W/O & W CONTRAST 2/13/2021    Impression:    1. Little change in the peripherally enhancing right optic nerve  lesion, nearly appearing cystic centrally, which does not have the  classic appearance of optic neuritis. Differences in findings may be secondary to technique.  2. No acute abnormalities. The left optic nerve appears unremarkable    Visual acuity 20/25 RIGHT eye and 20/25 left eye.  Color vision normal both eyes.  Pupils normal without afferent pupillary defect RIGHT eye (dilated prior to my examination).     Visual field GTop:  Right eye: dense inferior altitudinal defect that has progressed since 2/17/21 and 1/2021. This defect respects the horizontal meridian. MD -14.0  Left eye: normal field " with nonspecific defects. MD -0.4      We discussed that she has a worsening inferior altitudinal scotoma in the right eye which is either related to the biopsy or worsening disease since we had held her prednisone for 1 week prior to the biopsy. We are still awaiting the results of the surgical pathology biopsy. The leukemia/lymphoma evaluation was unable to be determined due to the markedly decreased viability. We will continue Prednisone 80 mg daily until Thursday, then taper by 20 mg weekly until at 40 mg, then decrease by 10 mg weekly. She will follow up in 2-3 weeks for visual field and rNFL.           Attending Physician Attestation:  Complete documentation of historical and exam elements from today's encounter can be found in the full encounter summary report (not reduplicated in this progress note).  I personally obtained the chief complaint(s) and history of present illness.  I confirmed and edited as necessary the review of systems, past medical/surgical history, family history, social history, and examination findings as documented by others; and I examined the patient myself.  I personally reviewed the relevant tests, images, and reports as documented above.  I formulated and edited as necessary the assessment and plan and discussed the findings and management plan with the patient and family. I personally reviewed the ophthalmic test(s) associated with this encounter, agree with the interpretation(s) as documented by the resident/fellow, and have edited the corresponding report(s) as necessary.  - Saul Swanson MD  Resident

## 2021-03-01 NOTE — TELEPHONE ENCOUNTER
Patient's vision stable since procedure despite steroids.  She wants to know if Dr. Brumfield agreeable with plan for steroid or if should do higher burst at first.  Explained that it is possible this is inflammation and will take time, but as discussed prior to the procedure, there is risk this is complication of surgery and will not improve.  Tentatively put her on the schedule for today, but will discuss with Dr. Brumfield if he feels necessary or if he would like to change steroid plan.     Kristi Flores on 3/1/2021 at 8:48 AM

## 2021-03-01 NOTE — NURSING NOTE
Chief Complaints and History of Present Illnesses   Patient presents with     Blurred Vision Follow-Up     Chief Complaint(s) and History of Present Illness(es)     Blurred Vision Follow-Up               Comments     Alexa Kline is a 60 year old female with the following diagnoses:   1. Optic perineuritis   2. Visual field defect    Had a biopsy done last Thursday. Vision got worse and has not recovered since then.     Kelley Ventura CO 1:32 PM March 1, 2021

## 2021-03-01 NOTE — TELEPHONE ENCOUNTER
I spoke to the patient who notes vision loss in the lower half of her right eye since her Optic Nerve Sheath Biopsy on February 26th.    She is requesting that Dr. Brumfield look at her oral Prednisone dosage.  She asked for a return phone call at there home phone number.

## 2021-03-05 NOTE — TELEPHONE ENCOUNTER
Ordered safety screening laboratory testing to be completed prior to consideration of steroid sparing immune suppressive therapy (Hepatitis B, Hepatitis C, HIV, Quantiferon for TB). [place] [facility] and we can set up an appointment for a lab draw, or the patient can stop by the lab when she sees Dr. Matos on 3/9/21. There is no need to go there any sooner unless she prefers to do so, as these tests typically take a few days to come back.    Happy to see Ms. Kline next week. How about Tuesday, 3/9 at 10AM or Wed, 3/10 at 2PM.

## 2021-03-05 NOTE — TELEPHONE ENCOUNTER
[facility] Call Center    Phone Message    May a detailed message be left on voicemail: yes     Reason for Call: Other:   Pt would like a call back from Octaviano in regards to treatment next steps    Pt states that Dr Thibodeaux was going to connect with Dr Brumfield to discuss treatment. Pt would like to know if they've connected yet. Pt would like to get started on the treatment asap.     Please follow-up with pt.     Action Taken: Other:   eye    Travel Screening: Not Applicable

## 2021-03-05 NOTE — TELEPHONE ENCOUNTER
Spoke with patient to let her know that Dr. Brumfield isn't in clinic today but the message has been forwarded to him. I told patient that I am covering for Lissa and once I hear back from Dr. Brumfield, I'll reach out to her. I did inform patient that I am not in clinic on Mondays but will try to check my messages that day if possible to see if Dr. Brumfield responded back yet or not.     JESS VALLEJO, COT on 3/5/2021 at 12:26 PM

## 2021-03-09 PROBLEM — Z15.89 HLA B27 POSITIVE: Status: ACTIVE | Noted: 2021-01-01

## 2021-03-09 NOTE — PATIENT INSTRUCTIONS
Continue prednisone taper per Dr. Brumfield. Would not specifically recommend increase dose of prednisone nor IV steroid at this time as not specifically recommended by my Partners    We discussed Methotrexate and Ms. Kline and would like to review her options     We placed a consult to Rheumatology today to discuss Rituximab, hopefully in the next few weeks

## 2021-03-09 NOTE — LETTER
3/9/2021       RE: Alexa Kline  [address] [address]     Dear Colleague,    Thank you for referring your patient, Alexa Kline, to the [facility] at [facility]. Please see a copy of my visit note below.    Chief Complaint/Presenting Concern: Optic perineuritis    History of Present Illness:   Alexa Kline is a 60 year old patient who presents for evaluation of optic perineuritis of the right eye from Dr. Bandar Brumfield and Dr. Morelia Matos.    The history began in the fall 2020 with a diagnosis of an atypical optic neuropathy of the right eye, although Ms. Kline things may have been blurry even prior to that when she realized that she could not see out of the bottom of the right eye when reading. She saw one Eye Specialist who identified a floater, but things progressed. She then saw a retina specialist who suspected an optic nerve process and referred her Dr. Brumfield.     She was then seen by Dr. Brumfield who performed imaging identified a ring-enhancing of the right optic nerve and laboratory work-up was performed which was negative for infectious and inflammatory causes of optic neuropathy as well as MOG and NMO.  High-dose oral corticosteroids were initiated with a taper over several weeks.  In January 2021 the inferior visual field defect was stable to slightly improved and an MRI showed improvement in the enhancement around the right optic nerve.    In early February 2021 the patient noted new vision changes in the right eye this time affecting the superior visual field. An MRI showed an increase in the changes around the right optic nerve and the patient was started on high-dose oral prednisone.  Given concerns for recurrence and the need for a second course of prednisone the patient was referred to Dr. Matos for optic nerve sheath biopsy of the right eye which was performed on February 25, 2021.  Cytology evaluation  showed rare to absent viable B/T cells but no clear homogeneous cell proliferation, albeit the sample was reported as having reduced cellular viability.    The patient subsequently noted worsening vision in the right eye after her optic nerve sheath biopsy.  Given that the scotoma was inferiorly there was consideration for a relationship to the superior aspect of the optic nerve which had been biopsied.  Patient was recommended to continue prednisone 80 mg daily and then reduce to 60 mg this week.  I was in communication with Dr. Brumfield and Dr. Matos to see the patient to discuss possible steroid sparing immune suppressive therapy.  Laboratory testing was performed today and as noted below.    At this time, Ms. Alexa Kline has a clear distinction between things that are clear and blurry in the right eye. If she moves her eye, she can see move things into the clear area. The left eye has not been affected.    Additional Ocular History:   1. Vitreous floater  2. Prior ptosis surgery    Relevant Past Medical/Family/Social History:  1. Osteopenia  2. Prior treatment for uterine (endometrial cancer)  3. Bronchiectasis  4. Generally lower end of blood pressure, no hyperlipidemia, no diabetes.  5. Peripheral lymphedema with persistent leg pain.     No family history of eye disease. HLA B27+ in sister and father.     Relevant Review of Systems: Some recent headaches. No vision changes with elevated blood pressure, no paresthesias when looking up.     Laboratory Testing   March 9, 2021 (performed today, pending); QuantiFERON, HIV, hepatitis C, hepatitis B  Labs reviewed from 9/2020: Normal: ACE, ESR CRP, CBC, BMP,,ANGELLA, treponemal, Lyme, MOG, NMO. HLA-B27 positive     Current eye related medications: Prednisone 60 mg daily, Restasis 2x/day each eye, Erythromycin ointment to right eyelid    Retina/Uveitis Imaging:  Topcon Disc Photos OU (both eyes) March 9, 2021  right eye: Peripapillary atrophy    left eye: Myopic  changes    OCT Optic Nerve RNFL Spectralis March 1, 2021 (reviewed from Dr. Brumfield)  right eye: Average thickness 72  m, new nasal borderline thinning versus September 2020.  left eye: Average thickness 70  m, scattered superior and temporal thinning.  Stable versus September 2020    G top visual field right eye (reviewed from March 1, 2021 with Dr. Brumfield): Fair reliability with wider inferior scotoma. Worse mean deviation versus February 2021    Assessment:    1. Optic perineuritis - Right Eye  Steroid reponsive    2. HLA B27 positive  Without associated uveitis or other known systemic associations. Positive family history    3. Operculated retinal tear of left eye  Inferiorly without symptoms, early pigment at base and no detachment    4. High risk medication use  Prednisone 60 mg daily.     Plan/Recommendations:    Discussed findings with patient. Agree that this is steroid responsive optic perineuritis as described by my Partners. Also agree that we should consider steroid sparing options such as anti-metabolites (e.g. Methotrexate) to reduce likelihood of flares and need for courses of prednisone.    Do not recommend additional diagnostic testing as labs done today.    HLA-B27 positivity was identified on initial lab testing in September 2020.  There are family members with HLA-B27 including her sister who has uveitis. Ms. Kline has no obvious systemic symptoms related to HLA-B27 and no uveitis    We identified an asymptomatic operculated retinal tear inferiorly in the left eye on dilated exam.  As there are no hemorrhages or other high risk features, this can be observed without laser    Continue prednisone taper per Dr. Brumfield. Would not specifically recommend increase dose of prednisone nor IV steroid at this time as not specifically recommended by my partners    We discussed Methotrexate and Ms. Kline and would like to review her options and discuss with Rheumatology.  I informed her that my personal  recommendation would be to start with methotrexate as her initial steroid sparing therapy, but she was also interested in discussing rituximab.  We did briefly discussed the risk of immune suppression and I informed her that this would be taken into consideration regardless of which option would be considered.     We placed a consult to Rheumatology today to discuss Rituximab, hopefully in the next few weeks.  I informed her that I was unsure if Rituximab would be recommended by Rheumatology nor if it would be efficacious for this condition.     RTC TBD with Aye. IOP dilate right eye, testing TBD    Physician Attestation     Attending Physician Attestation:  Complete documentation of historical and exam elements from today's encounter can be found in the full encounter summary report (not reduplicated in this progress note). I personally obtained the chief complaint(s) and history of present illness. I confirmed and edited as necessary the review of systems, past medical/surgical history, family history, social history, and examination findings as documented by others; and I examined the patient myself. I personally reviewed the relevant tests, images, and reports as documented above. I formulated and edited as necessary the assessment and plan and discussed the findings and management plan with the patient and any family members present at the time of the visit.  Morgan Griffiths M.D., Uveitis and Medical Retina, March 9, 2021     Sincerely,    Morgan Griffiths MD  [facility] Dept of Ophthalmology  Uveitis and Medical Retina

## 2021-03-09 NOTE — Clinical Note
Gunnar Blair: Hope all is well! This patient has optic perineuritis, a variant of orbital inflammation which is not quite like true demyelinating optic neuritis. One of my partners discussed with her about Rituximab and wanted to see if you would consider this with her? Thanks in advance. I placed a consult today.

## 2021-03-09 NOTE — PROGRESS NOTES
Chief Complaint/Presenting Concern: Optic perineuritis    History of Present Illness:   Alexa Kline is a 60 year old patient who presents for evaluation of optic perineuritis of the right eye from Dr. Bandar Brumfield and Dr. Morelia Matos.    The history began in the fall 2020 with a diagnosis of an atypical optic neuropathy of the right eye, although Ms. Kline things may have been blurry even prior to that when she realized that she could not see out of the bottom of the right eye when reading. She saw one Eye Specialist who identified a floater, but things progressed. She then saw a Retina Specialist who suspected an optic nerve process and referred her Dr. Brumfield.     She was then seen by Dr. Brumfield who performed imaging identified a ring-enhancing of the right optic nerve and laboratory work-up was performed which was negative for infectious and inflammatory causes of optic neuropathy as well as MOG and NMO.  High-dose oral corticosteroids were initiated with a taper over several weeks.  In January 2021 the inferior visual field defect was stable to slightly improved and an MRI showed improvement in the enhancement around the right optic nerve.    In early February 2021 the patient noted new vision changes in the right eye this time affecting the superior visual field. An MRI showed an increase in the changes around the right optic nerve and the patient was started on high-dose oral prednisone.  Given concerns for recurrence and the need for a second course of prednisone the patient was referred to Dr. Matos for optic nerve sheath biopsy of the right eye which was performed on February 25, 2021.  Cytology evaluation showed rare to absent viable B/T cells but no clear homogeneous cell proliferation, albeit the sample was reported as having reduced cellular viability.    The patient subsequently noted worsening vision in the right eye after her optic nerve sheath biopsy.  Given that the scotoma was inferiorly  there was consideration for a relationship to the superior aspect of the optic nerve which had been biopsied.  Patient was recommended to continue prednisone 80 mg daily and then reduce to 60 mg this week.  I was in communication with Dr. Brumfield and Dr. Matos to see the patient to discuss possible steroid sparing immune suppressive therapy.  Laboratory testing was performed today and as noted below.    At this time, Ms. Alexa Kline has a clear distinction between things that are clear and blurry in the right eye. If she moves her eye, she can see move things into the clear area. The left eye has not been affected.    Additional Ocular History:   1. Vitreous floater  2. Prior ptosis surgery    Relevant Past Medical/Family/Social History:  1. Osteopenia  2. Prior treatment for uterine (endometrial cancer)  3. Bronchiectasis  4. Generally lower end of blood pressure, no hyperlipidemia, no diabetes.  5. Peripheral lymphedema with persistent leg pain.     No family history of eye disease. HLA B27+ in Sister and Father.     Relevant Review of Systems: Some recent headaches. No vision changes with elevated blood pressure, no paresthesias when looking up.     Laboratory Testing   March 9, 2021 (performed today, pending); QuantiFERON, HIV, hepatitis C, hepatitis B  Labs reviewed from 9/2020: Normal: ACE, ESR CRP, CBC, BMP,,ANGELLA, treponemal, Lyme, MOG, NMO. HLA-B27 positive     Current eye related medications: Prednisone 60 mg daily, Restasis 2x/day each eye, Erythromycin ointment to right eyelid    Retina/Uveitis Imaging:  Topcon Disc Photos OU (both eyes) March 9, 2021  right eye: Peripapillary atrophy    left eye: Myopic changes    OCT Optic Nerve RNFL Spectralis March 1, 2021 (reviewed from Dr. Brumfield)  right eye: Average thickness 72  m, new nasal borderline thinning versus September 2020.  left eye: Average thickness 70  m, scattered superior and temporal thinning.  Stable versus September 2020    G top visual field  right eye (reviewed from March 1, 2021 with Dr. Brumfield): Fair reliability with wider inferior scotoma. Worse mean deviation versus February 2021    Assessment:    1. Optic perineuritis - Right Eye  Steroid reponsive    2. HLA B27 positive  Without associated uveitis or other known systemic associations. Positive family history    3. Operculated retinal tear of left eye  Inferiorly without symptoms, early pigment at base and no detachment    4. High risk medication use  Prednisone 60 mg daily.     Plan/Recommendations:    Discussed findings with patient. Agree that this is steroid responsive optic perineuritis as described by my Partners. Also agree that we should consider steroid sparing options such as anti-metabolites (e.g. Methotrexate) to reduce likelihood of flares and need for courses of prednisone.    Do not recommend additional diagnostic testing as labs done today.    HLA-B27 positivity was identified on initial lab testing in September 2020.  There are family members with HLA-B27 including her sister who has uveitis. Ms. Kline has no obvious systemic symptoms related to HLA-B27 and no uveitis    We identified an asymptomatic operculated retinal tear inferiorly in the left eye on dilated exam.  As there are no hemorrhages or other high risk features, this can be observed without laser    Continue prednisone taper per Dr. Brumfield. Would not specifically recommend increase dose of prednisone nor IV steroid at this time as not specifically recommended by my [facility]    We discussed Methotrexate and Ms. Kline and would like to review her options and discuss with Rheumatology.  I informed her that my personal recommendation would be to start with methotrexate as her initial steroid sparing therapy, but she was also interested in discussing rituximab.  We did briefly discussed the risk of immune suppression and I informed her that this would be taken into consideration regardless of which option would be  considered    We placed a consult to Rheumatology today to discuss Rituximab, hopefully in the next few weeks.  I informed her that I was unsure if Rituximab would be recommended by rheumatology nor if it would be efficacious for this condition.     RTC TBD with Aye. IOP dilate right eye, testing TBD    Physician Attestation     Attending Physician Attestation:  Complete documentation of historical and exam elements from today's encounter can be found in the full encounter summary report (not reduplicated in this progress note). I personally obtained the chief complaint(s) and history of present illness. I confirmed and edited as necessary the review of systems, past medical/surgical history, family history, social history, and examination findings as documented by others; and I examined the patient myself. I personally reviewed the relevant tests, images, and reports as documented above. I formulated and edited as necessary the assessment and plan and discussed the findings and management plan with the patient and any family members present at the time of the visit.  Morgan Griffiths M.D., Uveitis and Medical Retina, March 9, 2021

## 2021-03-09 NOTE — NURSING NOTE
Chief Complaints and History of Present Illnesses   Patient presents with     Uveitis Evaluation     Chief Complaint(s) and History of Present Illness(es)     Uveitis Evaluation     Laterality: both eyes    Onset: gradual    Onset: weeks ago    Quality: States that the va in the right eye has decreased since the biopsy      Associated symptoms: floaters, flashes (only happened last nigh), eye pain (since the biopsy and with eye movement) and headache (occ.).  Negative for photophobia    Treatments tried: eye drops    Pain scale: 2/10              Comments     Restasis BID each eye   Linnea Burton COT 8:32 AM March 9, 2021

## 2021-03-11 NOTE — TELEPHONE ENCOUNTER
"hydrOXYzine (ATARAX) 25 MG tablet   Take 1 tablet (25 mg) by mouth At Bedtime   Last Written Prescription Date:  3/1/2021  Last Fill Quantity: 30,   # refills: 1  Last Office Visit : 3/1/2021 [facility]  Future Office visit:  3/17/2021 Octaviano  Denied  Per fax: \"Need authorization for early refill or new script sent for 50 MG tablets\"  [id number] - [place], [place] - [address] AT [facility]  [phone]  Called pharmacy: unclear re: early refill as this is first time script sent by Dr Brumfield.( per Epic chart review).  Per pharmacy tech and pharmacist, patient wanted to increase dose. She was asked to contact MD office directly via [facility] or phone call to discuss medication adjustment.    "

## 2021-03-15 NOTE — PROGRESS NOTES
Outpatient Rheumatology Consultation  This visit was conducted via synchronous video visit due to the current COVID-19 crisis to reduce patient risk.  Verbal consent was obtained and is documented below.    Name: Alexa Kline    MRN [id number]   Today's date: 3/15/2021         Reason for consult: Assist with steroid sparing agent for optic perineuritis   Requesting physician: Morgan Griffiths MD             Assessment & Plan:   60 year old female with right eye vision changes found to have ring enhancement of right optic nerve consistent with optic perineuritis which has been steroid responsive. Patient followed closely by Vandana Griffiths and Octaviano. She is referred to rheumatology today for consideration of steroid sparing agent as each attempt to taper has resulted in return of inflammatory disease. Discussed published options of azathioprine, methotrexate, TNF-inhibitors, and anti-CD20 therapy. While not robust, the majority of published reports on steroid sparing immunosuppressant use in this context is with AZA>methotrexate and then very little with TNF and rituxan. Though, these are options that can be used next if needed. As such, recommended that we start with AZA. Have ordered TPMT which was drawn today. Once this returns, will start. The other benefit of this therapy is that it will build to therapeutic levels much faster than methotrexate and should allow a faster and safer prednisone taper.     PLAN:  1) TPMT today  2) Once this returns, will call with results and instructions on starting AZA   3) F/u with Alyssa Griffiths/Octaviano as is already planned  4) Continue on prednisone taper guided by ophthalmology exams  5) will call to schedule follow-up in 1 month to ensure tolerating AZA without issues  6) Will order q4wk labs for initial 2 months then q3 months thereafter on AZA    Johnnie Fuentes MD  Rheumatology     I spent a total of 60 minutes on the date of service on chart review, patient video  encounter, , documentation.     Subjective:   In 2020 developed blurred vision -> decreased vision out of the bottom of the right eye when reading -> referred to Dr. Brumfield who found ring enhancement of the right optic nerve. Had CNS demyelinating disease serologies sent for NMO/AQP4 and MOG which were negative. ANGELLA negative. ESR was in normal range at 8. Lyme and treponema negative. ACE in normal range at 12. Cysticercosis IgG negative. HLA-B27 checked and positive. She was started on high dose prednisone with some initial stabilization and possible improvement but then in feb 2021 had active inflammation causing vision changes and increased optic nerve enhancement. High dose prednisone started again with optic nerve sheath biopsy performed. Patient was seen by Dr. Griffiths on 3/9/21 for evaluation who refers the patient to me today to discuss steroid sparing agents that would be appropriate for the patient. Saw PCP last week, bone density scan ordered. Also will have spinal MR today. To be done today. When she went of off steroids, has had some hair loss. No scarring plaques. Noticed more hair in the drain. Has had HA which is a new symptom since this current recurrence, has not been able to shake since her bx. Feels like she is straining. Cannot see out of the bottom half of the eye. No hearing changes. No rashes. No photosensitivity. Does still get hot flushes, for maybe one year. No night sweats. Does report weight loss, in June developed diarrhea had infectious work-up which was negative. Thought it was related to salad she was eating at the same. Lost 10 pounds at that time, slowly coming back now that she is on steroids. No ongoing diarrhea. No blood in the stool. No n/v. No recurrent hemoptysis, sinusitis, PNA. This year did develop minor epistaxis, thought it was due to cold dry weather here. Stopped the nasonex and stopped. No further bleeds. No oral or nasal ulcers. No change in strength or  sensation in arms/hands. Has baseline lymphedema so some sensation changes in feet. No skin tightening. No raynauds. No nail changes. No dry mouth. Has occasional swelling in her knees. Not red or hot. No pain. Also has occasional swelling at her ankles.      Past Medical History  Past Medical History:   Diagnosis Date     Bronchiectasis (H)      Hyperglycemia      Lymphedema      Malignant neoplasm of endometrium (H)      Optic perineuritis      Orbital lesion      Osteopenia      Seizure (H)      Uterine cancer (H)      Vitreous floater        Past Surgical History  Past Surgical History:   Procedure Laterality Date     REPAIR PTOSIS Bilateral      SHEATHOTOMY NERVE OPTIC Right 2/25/2021    Procedure: Right optic nerve sheath biopsy;  Surgeon: Morelia Matos MD;  Location: [facility] OR       Medications  Current Outpatient Medications   Medication     acetaminophen (TYLENOL) 500 MG tablet     ALBUTEROL IN     ASMANEX, 30 METERED DOSES, 220 MCG/INH inhaler     biotin 1000 MCG TABS tablet     calcium 600-200 MG-UNIT TABS     cetirizine (ZYRTEC) 10 MG tablet     cycloSPORINE (RESTASIS) 0.05 % ophthalmic emulsion     erythromycin (ROMYCIN) 5 MG/GM ophthalmic ointment     hydrOXYzine (ATARAX) 25 MG tablet     linaclotide (LINZESS) 145 MCG capsule     Melatonin-Pyridoxine (MELATONEX PO)     mometasone (NASONEX) 50 MCG/ACT nasal spray     Multiple Vitamins-Minerals (MULTIVITAMIN ADULT PO)     Omega-3 Fatty Acids (FISH OIL) 1200 MG capsule     predniSONE (DELTASONE) 20 MG tablet     Vitamin D, Cholecalciferol, 25 MCG (1000 UT) TABS     No current facility-administered medications for this visit.      Allergies   No Known Allergies    Family History  Mother with RA. HLA-B27+ father and sister with inflammatory eye disease.    Social History  , 2 kids. Healthy. Drinks 3-5 drinks per week during the pandemic. Never smoker. No drug use now or in the past.      Objective:    Physical exam:  No vitals for this video  visit. Vitals reviewed in Epic.  GEN: Sitting up at table. NAD  HEENT: no facial rash, sclera clear, no inflammatory nose/ external ear changes  CV: no upper extremity dependent edema  Pulm: speaking in full sentences, no cough, no audible wheezing, no use of accessory muscles  Abdomen: not distended  Skin: no acute cutaneous lesions  MSK: full active ROM of bilateral shoulders, elbows, wrists, MCPs, PIPs, DIPs. Can make full fist without difficulty. No erythema or edema of these joints.    Labs:  3/9/21  Hep B sAG negative  Hep c AB negative  HIV negative  Quant gold negative    21  Cr 0.73    21  CRP <2.9  ESR 4    2020  HLA-B27 positive  ANGELLA, ACE, lyme, treponema, NMO/AQP4, MOG all negative/normal    Imagin21                                                                Impression:    1. Little change in the peripherally enhancing right optic nerve  lesion, nearly appearing cystic centrally, which does not have the  classic appearance of optic neuritis. Differences in findings may be  secondary to technique.  2. No acute abnormalities. The left optic nerve appears unremarkable..     Pathology:  21  FINAL DIAGNOSIS:   A. Right optic nerve sheath, biopsy: Chronic inflammation. No evidence of   lymphoma.

## 2021-03-15 NOTE — PROGRESS NOTES
Left voicemail for patient to call back to set up telemedicine visit, will call again before appointment time.  Sonia Winters CMA on 3/15/2021 at 12:30 PM

## 2021-03-15 NOTE — LETTER
3/15/2021       RE: Alexa Kline  [address] [address]     Dear Colleague,    Thank you for referring your patient, Alexa Kline, to the [facility] RHEUMATOLOGY CLINIC [place] at [facility]. Please see a copy of my visit note below.      Outpatient Rheumatology Consultation  This visit was conducted via synchronous video visit due to the current COVID-19 crisis to reduce patient risk.  Verbal consent was obtained and is documented below.    Name: Alexa Kline    MRN [id number]   Today's date: 3/15/2021         Reason for consult: Assist with steroid sparing agent for optic perineuritis   Requesting physician: Morgan Griffiths MD             Assessment & Plan:   60 year old female with right eye vision changes found to have ring enhancement of right optic nerve consistent with optic perineuritis which has been steroid responsive. Patient followed closely by Vandana Griffiths and Octaviano. She is referred to rheumatology today for consideration of steroid sparing agent as each attempt to taper has resulted in return of inflammatory disease. Discussed published options of azathioprine, methotrexate, TNF-inhibitors, and anti-CD20 therapy. While not robust, the majority of published reports on steroid sparing immunosuppressant use in this context is with AZA>methotrexate and then very little with TNF and rituxan. Though, these are options that can be used next if needed. As such, recommended that we start with AZA. Have ordered TPMT which was drawn today. Once this returns, will start. The other benefit of this therapy is that it will build to therapeutic levels much faster than methotrexate and should allow a faster and safer prednisone taper.     PLAN:  1) TPMT today  2) Once this returns, will call with results and instructions on starting AZA   3) F/u with Alyssa Griffiths/Octaviano as is already planned  4) Continue on prednisone taper guided by  ophthalmology exams  5) will call to schedule follow-up in 1 month to ensure tolerating AZA without issues  6) Will order q4wk labs for initial 2 months then q3 months thereafter on AZA    Johnnie Fuentes MD  Rheumatology     I spent a total of 60 minutes on the date of service on chart review, patient video encounter, , documentation.     Subjective:   In 2020 developed blurred vision -> decreased vision out of the bottom of the right eye when reading -> referred to Dr. Brumfield who found ring enhancement of the right optic nerve. Had CNS demyelinating disease serologies sent for NMO/AQP4 and MOG which were negative. ANGELLA negative. ESR was in normal range at 8. Lyme and treponema negative. ACE in normal range at 12. Cysticercosis IgG negative. HLA-B27 checked and positive. She was started on high dose prednisone with some initial stabilization and possible improvement but then in feb 2021 had active inflammation causing vision changes and increased optic nerve enhancement. High dose prednisone started again with optic nerve sheath biopsy performed. Patient was seen by Dr. Griffiths on 3/9/21 for evaluation who refers the patient to me today to discuss steroid sparing agents that would be appropriate for the patient. Saw PCP last week, bone density scan ordered. Also will have spinal MR today. To be done today. When she went of off steroids, has had some hair loss. No scarring plaques. Noticed more hair in the drain. Has had HA which is a new symptom since this current recurrence, has not been able to shake since her bx. Feels like she is straining. Cannot see out of the bottom half of the eye. No hearing changes. No rashes. No photosensitivity. Does still get hot flushes, for maybe one year. No night sweats. Does report weight loss, in June developed diarrhea had infectious work-up which was negative. Thought it was related to salad she was eating at the same. Lost 10 pounds at that time, slowly coming back  now that she is on steroids. No ongoing diarrhea. No blood in the stool. No n/v. No recurrent hemoptysis, sinusitis, PNA. This year did develop minor epistaxis, thought it was due to cold dry weather here. Stopped the nasonex and stopped. No further bleeds. No oral or nasal ulcers. No change in strength or sensation in arms/hands. Has baseline lymphedema so some sensation changes in feet. No skin tightening. No raynauds. No nail changes. No dry mouth. Has occasional swelling in her knees. Not red or hot. No pain. Also has occasional swelling at her ankles.      Past Medical History  Past Medical History:   Diagnosis Date     Bronchiectasis (H)      Hyperglycemia      Lymphedema      Malignant neoplasm of endometrium (H)      Optic perineuritis      Orbital lesion      Osteopenia      Seizure (H)      Uterine cancer (H)      Vitreous floater        Past Surgical History  Past Surgical History:   Procedure Laterality Date     REPAIR PTOSIS Bilateral      SHEATHOTOMY NERVE OPTIC Right 2/25/2021    Procedure: Right optic nerve sheath biopsy;  Surgeon: Morelia Matos MD;  [facility]       Medications  Current Outpatient Medications   Medication     acetaminophen (TYLENOL) 500 MG tablet     ALBUTEROL IN     ASMANEX, 30 METERED DOSES, 220 MCG/INH inhaler     biotin 1000 MCG TABS tablet     calcium 600-200 MG-UNIT TABS     cetirizine (ZYRTEC) 10 MG tablet     cycloSPORINE (RESTASIS) 0.05 % ophthalmic emulsion     erythromycin (ROMYCIN) 5 MG/GM ophthalmic ointment     hydrOXYzine (ATARAX) 25 MG tablet     linaclotide (LINZESS) 145 MCG capsule     Melatonin-Pyridoxine (MELATONEX PO)     mometasone (NASONEX) 50 MCG/ACT nasal spray     Multiple Vitamins-Minerals (MULTIVITAMIN ADULT PO)     Omega-3 Fatty Acids (FISH OIL) 1200 MG capsule     predniSONE (DELTASONE) 20 MG tablet     Vitamin D, Cholecalciferol, 25 MCG (1000 UT) TABS     No current facility-administered medications for this visit.      Allergies   No Known  Allergies    Family History  Mother with RA. HLA-B27+ father and sister with inflammatory eye disease.    Social History  , 2 kids. Healthy. Drinks 3-5 drinks per week during the pandemic. Never smoker. No drug use now or in the past.      Objective:    Physical exam:  No vitals for this video visit. Vitals reviewed in Epic.  GEN: Sitting up at table. NAD  HEENT: no facial rash, sclera clear, no inflammatory nose/ external ear changes  CV: no upper extremity dependent edema  Pulm: speaking in full sentences, no cough, no audible wheezing, no use of accessory muscles  Abdomen: not distended  Skin: no acute cutaneous lesions  MSK: full active ROM of bilateral shoulders, elbows, wrists, MCPs, PIPs, DIPs. Can make full fist without difficulty. No erythema or edema of these joints.    Labs:  3/9/21  Hep B sAG negative  Hep c AB negative  HIV negative  Quant gold negative    21  Cr 0.73    21  CRP <2.9  ESR 4    2020  HLA-B27 positive  ANGELLA, ACE, lyme, treponema, NMO/AQP4, MOG all negative/normal    Imagin21                                                                Impression:    1. Little change in the peripherally enhancing right optic nerve  lesion, nearly appearing cystic centrally, which does not have the  classic appearance of optic neuritis. Differences in findings may be  secondary to technique.  2. No acute abnormalities. The left optic nerve appears unremarkable..     Pathology:  21  FINAL DIAGNOSIS:   A. Right optic nerve sheath, biopsy: Chronic inflammation. No evidence of   lymphoma.             Left voicemail for patient to call back to set up telemedicine visit, will call again before appointment time.  Sonia Winters CMA on 3/15/2021 at 12:30 PM      Alexa is a 60 year old who is being evaluated via a billable video visit.      How would you like to obtain your AVS? MyChart  If the video visit is dropped, the invitation should be resent by: Text to cell phone:  [phone]  Will anyone else be joining your video visit? No      Video-Visit Details    Type of service:  Video Visit    Start: 03/15/2021 01:31 pm   Stop: 03/15/2021 02:15 pm    Originating Location (pt. Location):Home    Distant Location (provider location):  [facility] RHEUMATOLOGY CLINIC [place]     Platform used for Video Visit: Joe Fuentes MD  Rheumatology

## 2021-03-15 NOTE — PROGRESS NOTES
Alexa is a 60 year old who is being evaluated via a billable video visit.      How would you like to obtain your AVS? MyChart  If the video visit is dropped, the invitation should be resent by: Text to cell phone: [phone]  Will anyone else be joining your video visit? No      Video-Visit Details    Type of service:  Video Visit    Start: 03/15/2021 01:31 pm   Stop: 03/15/2021 02:15 pm    Originating Location (pt. Location):Home    Distant Location (provider location):  [facility] RHEUMATOLOGY CLINIC [place]     Platform used for Video Visit: Joe Fuentes MD  Rheumatology

## 2021-03-17 NOTE — LETTER
3/17/2021         RE:  :  MRN: Alexa Kline  1960  [id number]     Dear Dr. Castañeda:     Your patient, Alexa Kline, returned for neuro-ophthalmic follow up. My assessment and plan are below.  For further details, please see my attached clinic note.      Assessment & Plan     Alexa Kline is a 60 year old female with the following diagnoses:   1. Optic perineuritis    2. Visual field defect         Alexa Kline is a 60 year old female with a history of atypical optic neuritis diagnosed in the fall of . She had a ring enhancing lesion of her right optic nerve and lab workup was unremarkable. She received high dose po steroids for 3 days with a slow steroid taper ending mid 2020. She was recently evaluated in neuro-ophthalmology clinic on 21 at that time altitudinal VF defect in right eye was improved, no APD and VA was 20/20. She had a repeat MRI that day with improvement in the ring enhancement of right optic nerve.      On 2/10/21 she described new vision changes in right eye that were progressive. She notices that she doesn't see all the words on a page.  In upper left quadrant would be all blurred out and other areas bolder.   She describes that the upper left of her vision in right eye is difficult to see and she has to shift the book to the right and a little bit down to bring it into focus. She describes pain in extreme positions of gaze. No flashes but she has stable floaters. MRI orbit showed mild increase in the enhancement of the cystic lesion in the right optic nerve. She was started on 100 mg prednisone for 3 day that was decreased to 80 mg.  She feels it is improved since starting the prednisone.  Weight 129 pounds     Immediately after the RIGHT optic nerve sheath biopsy on 2021, she woke up groggy and does not recall having a new or worsening scotoma. The next day when she woke up from sleep, she noticed a dense inferior scotoma with a tiny wedge in the  central vision. She discussed this with Dr. Veloz and Dr. Matos, who felt this could be relate to the biopsy because they dissected the superior portion of the sheath and he did try to get a very superficial biopsy of the nerve infiltration that was visualized    She reports that the the inferior altitudinal defect in the right is stable but she started noticing pain in the left eye mainly when she is looking up. It happens once in a while and the pain is very mild. She is currently in 40mg , she will decrease it to 30mg tomorrow    She saw Dr Fuentes on Monday, he wants to check TPMT then he will start her on AZA    Laboratory:  Leukemia/lymphoma evaluation from right optic nerve sheath biopsy:  Right optic nerve sheath:        Interpretation limited by low viability        Rare to absent viable B cells        Rare to absent viable T cells        See comment   This specimen has markedly decreased viability, which essentially   precludes meaningful evaluation    Pathology: FINAL DIAGNOSIS:   A. Right optic nerve sheath, biopsy: Chronic inflammation. No evidence of   lymphoma. (2/25/2021_     Her visual acuity is 20/20 in both eyes with APD in the right eye. Color plates are full. Automated visual fields showed stable inferior altitudinal defect in the right eye and normal visual fields in the left eye    My impression is that she has optic perineuritis (biopsy proven).  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      1- taper prednisone as instructed by rheumatology   2- AZA will be started soon  3- stop azithromycin ointment and switch to lubricating ointment at bedtime    Follow up 1 year sooner as needed for worsening symptoms.       Again, thank you for allowing me to participate in the care of your patient.      Sincerely,    Saul Brumfield MD  Professor  Ophthalmology Residency   Director of  Neuro-Ophthalmology  [employer] Endowed Chair  Departments of Ophthalmology, Neurology, and Neurosurgery  [facility]      [address], [place]  [address]  T - [phone]  F - [phone]  [email or web address]      CC: Mariana Price  [address]  [place] [address]  Via Outside Provider Messaging     Toby Castañeda MD  [facility]  [address]  [facility] [address]  Via Fax: [phone]     Janak Curry MD  [address]  [facility] [address]  Via In Basket    DX = optic perineuritis, biopsy

## 2021-03-17 NOTE — LETTER
3/17/2021       RE: Alexa Kline  [address] [address]     Dear Colleague,    Thank you for referring your patient, Alexa Kline, to the [facility] OPHTHALMOLOGY CLINIC [place] at [facility]. Please see a copy of my visit note below.           Assessment & Plan     Alexa Kline is a 60 year old female with the following diagnoses:   1. Optic perineuritis    2. 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 She had a ring enhancing lesion of her right optic nerve and lab workup was unremarkable. She received high dose po steroids for 3 days with a slow steroid taper ending mid November 2020. She was recently evaluated in neuro-ophthalmology clinic on 1/6/21 at that time altitudinal VF defect in right eye was improved, no APD and VA was 20/20. She had a repeat MRI that day with improvement in the ring enhancement of right optic nerve.      On 2/10/21 she described new vision changes in right eye that were progressive. She notices that she doesn't see all the words on a page.  In upper left quadrant would be all blurred out and other areas bolder.   She describes that the upper left of her vision in right eye is difficult to see and she has to shift the book to the right and a little bit down to bring it into focus. She describes pain in extreme positions of gaze. No flashes but she has stable floaters. MRI orbit showed mild increase in the enhancement of the cystic lesion in the right optic nerve. She was started on 100 mg prednisone for 3 day that was decreased to 80 mg.  She feels it is improved since starting the prednisone.  Weight 129 pounds     Immediately after the RIGHT optic nerve sheath biopsy on February 25, 2021, she woke up groggy and does not recall having a new or worsening scotoma. The next day when she woke up from sleep, she  noticed a dense inferior scotoma with a tiny wedge in the central vision. She discussed this with Dr. Veloz and Dr. Matos, who felt this could be relate to the biopsy because they dissected the superior portion of the sheath and he did try to get a very superficial biopsy of the nerve infiltration that was visualized    She reports that the the inferior altitudinal defect in the right is stable but she started noticing pain in the left eye mainly when she is looking up. It happens once in a while and the pain is very mild. She is currently in 40mg , she will decrease it to 30mg tomorrow    She saw Dr Fuentes on Monday, he wants to check TPMT then he will start her on AZA    Laboratory:  Leukemia/lymphoma evaluation from right optic nerve sheath biopsy:  Right optic nerve sheath:        Interpretation limited by low viability        Rare to absent viable B cells        Rare to absent viable T cells        See comment   This specimen has markedly decreased viability, which essentially   precludes meaningful evaluation    Pathology: FINAL DIAGNOSIS:   A. Right optic nerve sheath, biopsy: Chronic inflammation. No evidence of   lymphoma. (2/25/2021_     Her visual acuity is 20/20 in both eyes with APD in the right eye. Color plates are full. Automated visual fields showed stable inferior altitudinal defect in the right eye and normal visual fields in the left eye    My impression is that she has optic perineuritis (biopsy proven).  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      1- taper prednisone as instructed by rheumatology   2- AZA will be started soon  3- stop azithromycin ointment and switch to lubricating ointment at bedtime    Follow up 1 year sooner as needed for worsening symptoms.           Attending Physician Attestation:  Complete documentation of historical and exam elements from today's encounter can be found in the  full encounter summary report (not reduplicated in this progress note).  I personally obtained the chief complaint(s) and history of present illness.  I confirmed and edited as necessary the review of systems, past medical/surgical history, family history, social history, and examination findings as documented by others; and I examined the patient myself.  I personally reviewed the relevant tests, images, and reports as documented above.  I formulated and edited as necessary the assessment and plan and discussed the findings and management plan with the patient and family. I personally reviewed the ophthalmic test(s) associated with this encounter, agree with the interpretation(s) as documented by the resident/fellow, and have edited the corresponding report(s) as necessary.  - Saul Veloz MD  Neuro-ophthalmology fellow  [facility]        Again, thank you for allowing me to participate in the care of your patient.      Sincerely,    Saul Brumfield MD

## 2021-03-17 NOTE — NURSING NOTE
Chief Complaints and History of Present Illnesses   Patient presents with     Follow Up     Optic perineuritis - Right Eye     Chief Complaint(s) and History of Present Illness(es)     Follow Up     Laterality: right eye    Onset: months ago    Frequency: constantly    Course: stable    Associated symptoms: eye pain    Treatments tried: no treatments    Pain scale: 1/10    Comments: Optic perineuritis - Right Eye              Comments     Pt states vision is exactly the same, no improvements. 1/10 pain, pt states occasionally feels like the left eye has pain too. Prednisone 40mg until tomorrow, tapering by 10mg by week. Pt has not been able to sleep with with Prednisone.     Octavio Reyes, TRISTIAN COT 7:39 AM March 17, 2021

## 2021-03-17 NOTE — PROGRESS NOTES
Assessment & Plan     Alexa Kline is a 60 year old female with the following diagnoses:   1. Optic perineuritis    2. 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 She had a ring enhancing lesion of her right optic nerve and lab workup was unremarkable. She received high dose po steroids for 3 days with a slow steroid taper ending mid November 2020. She was recently evaluated in neuro-ophthalmology clinic on 1/6/21 at that time altitudinal VF defect in right eye was improved, no APD and VA was 20/20. She had a repeat MRI that day with improvement in the ring enhancement of right optic nerve.      On 2/10/21 she described new vision changes in right eye that were progressive. She notices that she doesn't see all the words on a page.  In upper left quadrant would be all blurred out and other areas bolder.   She describes that the upper left of her vision in right eye is difficult to see and she has to shift the book to the right and a little bit down to bring it into focus. She describes pain in extreme positions of gaze. No flashes but she has stable floaters. MRI orbit showed mild increase in the enhancement of the cystic lesion in the right optic nerve. She was started on 100 mg prednisone for 3 day that was decreased to 80 mg.  She feels it is improved since starting the prednisone.  Weight 129 pounds     Immediately after the RIGHT optic nerve sheath biopsy on February 25, 2021, she woke up groggy and does not recall having a new or worsening scotoma. The next day when she woke up from sleep, she noticed a dense inferior scotoma with a tiny wedge in the central vision. She discussed this with Dr. Veloz and Dr. Matos, who felt this could be relate to the biopsy because they dissected the superior portion of the sheath and he did try to get a very superficial biopsy of the nerve infiltration that was  visualized    She reports that the the inferior altitudinal defect in the right is stable but she started noticing pain in the left eye mainly when she is looking up. It happens once in a while and the pain is very mild. She is currently in 40mg , she will decrease it to 30mg tomorrow    She saw Dr Fuentes on Monday, he wants to check TPMT then he will start her on AZA    Laboratory:  Leukemia/lymphoma evaluation from right optic nerve sheath biopsy:  Right optic nerve sheath:        Interpretation limited by low viability        Rare to absent viable B cells        Rare to absent viable T cells        See comment   This specimen has markedly decreased viability, which essentially   precludes meaningful evaluation    Pathology: FINAL DIAGNOSIS:   A. Right optic nerve sheath, biopsy: Chronic inflammation. No evidence of   lymphoma. (2/25/2021_     Her visual acuity is 20/20 in both eyes with APD in the right eye. Color plates are full. Automated visual fields showed stable inferior altitudinal defect in the right eye and normal visual fields in the left eye    My impression is that she has optic perineuritis (biopsy proven).  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      1- taper prednisone as instructed by rheumatology   2- AZA will be started soon  3- stop azithromycin ointment and switch to lubricating ointment at bedtime    Follow up 1 year sooner as needed for worsening symptoms.           Attending Physician Attestation:  Complete documentation of historical and exam elements from today's encounter can be found in the full encounter summary report (not reduplicated in this progress note).  I personally obtained the chief complaint(s) and history of present illness.  I confirmed and edited as necessary the review of systems, past medical/surgical history, family history, social history, and examination findings as documented by  others; and I examined the patient myself.  I personally reviewed the relevant tests, images, and reports as documented above.  I formulated and edited as necessary the assessment and plan and discussed the findings and management plan with the patient and family. I personally reviewed the ophthalmic test(s) associated with this encounter, agree with the interpretation(s) as documented by the resident/fellow, and have edited the corresponding report(s) as necessary.  - Saul Veloz MD  Neuro-ophthalmology fellow  [facility]

## 2021-04-15 NOTE — Clinical Note
Cathleen Mora,     First of all poor alexa had an episode of ezio and was started on lithium since I saw her last! I thought she had some pressured speech and acted somewhat differently during the initial visit. She tells me strong psych family history today, so maybe our steroids pushed her over the top with insomnia... Her PCP started lithium and she is way down on prednisone so much improved.    Alexa was unsure of what to do next with her prednisone. She is currently on 5mg daily (was going to run out on 4/17 so I refilled for her).     I gather from Dr. Brumfield's most recent note that her optic perineuritis is quiet, correct? Would you like me to  continue on 5mg daily until we get to her goal of AZA (another 5 weeks or so) and then ill taper her off? That is typically what I would do for diseases I see, but I don't have the knowledge/ tools to examine her or know what to ask to know if that is too fast.     She is tolerating the ramp up of AZA well.    Thanks! Johnnie

## 2021-04-15 NOTE — PROGRESS NOTES
Outpatient Rheumatology Follow-up  This visit was conducted via synchronous video visit due to the current COVID-19 crisis to reduce patient risk.  Verbal consent was obtained and is documented below.    Name: Alexa Kline    MRN [id number]   Today's date: 4/15/2021         Reason for follow-up: Assist with steroid sparing agent for optic perineuritis   Ophthalmologist: Morgan Griffiths MD        Assessment & Plan:   60 year old female with right eye vision changes found to have ring enhancement of right optic nerve consistent with optic perineuritis which has been steroid responsive. Patient followed closely by Vandana Griffiths and Octaviano. Patient referred to rheumatology for assistance with steroid sparing agent in this context. Published options in this disease have included AZA, methotrexate, TNF-inhibitors, and ant-CD20 therapy. While not robust AZA>methotrexate and then very little with tnf and rituxan. So, obtained TPMT enzyme activity level, which was slightly low. So, reduced dose and slow ramp of AZA pursued with frequent lab checks. AZA 25mg daily started on 3/22/21 with labs/ uptitration q3 weeks.     Currently on 25mg BID, with last dose increase on 4/7 after lab check at that time. Will obtain labs on/around 4/28. If normal, will go up to 50mg in the AM and 25mg in the PM. Labs again 3 weeks later. If normal, will increase to final dose of 50mg in the AM and 50mg in the PM (roughly 1.5mg/kg/day, which is 1-2mg/kg/d goal - 50% reduced typical goal given TPMT enzyme activity).     High risk drug: AZA  - q3 week labs while uptitrating to goal  -No evidence of toxicity by labs or symptoms    Long term current steroid use: 5mg daily prendisone    PLAN:  1) continue on 25mg BID AZA  2) Labs around 4/28, will contact with results/ dose increase to 50mg in the AM and 25mg in the PM  3) Continue on prednisone 5mg daily (refilled) - I will contact Dr. Griffiths re: further taper  4) f/u in 6 weeks    Luke  Gina SERVIN  Rheumatology     Subjective:   Interval history 4/15/21: As of this AM, currently on 5mg of prednisone. Has noticed increased appetite. Had manic episode last week. Started in middle of march. Was on 10mg of prednisone at that time. Started on lithium on April 6th. Currently on 600mg lithium daily by PCP. Sleep is improving. Still with her new baseline vision loss on the right. No improvement, as expected. No pain. Tolerating AZA without issues. No n/v. New intermittent diarrhea, since lithium. No blood in stool. Just last few days. When she went up to 600mg, noticed this change in stool. No rash. No oral ulcers. No fever, chills, weight loss, interval infections.     HPI from initial consult:  In 2020 developed blurred vision -> decreased vision out of the bottom of the right eye when reading -> referred to Dr. Brumfield who found ring enhancement of the right optic nerve. Had CNS demyelinating disease serologies sent for NMO/AQP4 and MOG which were negative. ANGELLA negative. ESR was in normal range at 8. Lyme and treponema negative. ACE in normal range at 12. Cysticercosis IgG negative. HLA-B27 checked and positive. She was started on high dose prednisone with some initial stabilization and possible improvement but then in feb 2021 had active inflammation causing vision changes and increased optic nerve enhancement. High dose prednisone started again with optic nerve sheath biopsy performed. Patient was seen by Dr. Griffiths on 3/9/21 for evaluation who refers the patient to me today to discuss steroid sparing agents that would be appropriate for the patient. Saw PCP last week, bone density scan ordered. Also will have spinal MR today. To be done today. When she went of off steroids, has had some hair loss. No scarring plaques. Noticed more hair in the drain. Has had HA which is a new symptom since this current recurrence, has not been able to shake since her bx. Feels like she is straining. Cannot see out of  the bottom half of the eye. No hearing changes. No rashes. No photosensitivity. Does still get hot flushes, for maybe one year. No night sweats. Does report weight loss, in June developed diarrhea had infectious work-up which was negative. Thought it was related to salad she was eating at the same. Lost 10 pounds at that time, slowly coming back now that she is on steroids. No ongoing diarrhea. No blood in the stool. No n/v. No recurrent hemoptysis, sinusitis, PNA. This year did develop minor epistaxis, thought it was due to cold dry weather here. Stopped the nasonex and stopped. No further bleeds. No oral or nasal ulcers. No change in strength or sensation in arms/hands. Has baseline lymphedema so some sensation changes in feet. No skin tightening. No raynauds. No nail changes. No dry mouth. Has occasional swelling in her knees. Not red or hot. No pain. Also has occasional swelling at her ankles.      Past Medical History  Past Medical History:   Diagnosis Date     Bronchiectasis (H)      Hyperglycemia      Lymphedema      Malignant neoplasm of endometrium (H)      Optic perineuritis      Orbital lesion      Osteopenia      Seizure (H)      Uterine cancer (H)      Vitreous floater        Past Surgical History  Past Surgical History:   Procedure Laterality Date     REPAIR PTOSIS Bilateral      SHEATHOTOMY NERVE OPTIC Right 2/25/2021    Procedure: Right optic nerve sheath biopsy;  Surgeon: Morelia Matos MD;  Location: [facility] OR       Medications  Current Outpatient Medications   Medication     acetaminophen (TYLENOL) 500 MG tablet     ALBUTEROL IN     ASMANEX, 30 METERED DOSES, 220 MCG/INH inhaler     azaTHIOprine (IMURAN) 50 MG tablet     biotin 1000 MCG TABS tablet     calcium 600-200 MG-UNIT TABS     cetirizine (ZYRTEC) 10 MG tablet     cycloSPORINE (RESTASIS) 0.05 % ophthalmic emulsion     erythromycin (ROMYCIN) 5 MG/GM ophthalmic ointment     famotidine (PEPCID) 20 MG tablet     hydrOXYzine (ATARAX) 25 MG  tablet     linaclotide (LINZESS) 145 MCG capsule     Melatonin-Pyridoxine (MELATONEX PO)     mometasone (NASONEX) 50 MCG/ACT nasal spray     Multiple Vitamins-Minerals (MULTIVITAMIN ADULT PO)     Omega-3 Fatty Acids (FISH OIL) 1200 MG capsule     Vitamin D, Cholecalciferol, 25 MCG (1000 UT) TABS     No current facility-administered medications for this visit.      Allergies   No Known Allergies    Family History  Mother with RA. HLA-B27+ father and sister with inflammatory eye disease.    Social History  , 2 kids. Healthy. Drinks 3-5 drinks per week during the pandemic. Never smoker. No drug use now or in the past.      Objective:    Physical exam:  No vitals for this video visit. Vitals reviewed in Epic.  GEN: Sitting up at table. NAD  HEENT: no facial rash, sclera clear, no inflammatory nose/ external ear changes  CV: no upper extremity dependent edema  Pulm: speaking in full sentences, no cough, no audible wheezing, no use of accessory muscles  Abdomen: not distended  Skin: no acute cutaneous lesions  MSK: full active ROM of bilateral shoulders, elbows, wrists, MCPs, PIPs, DIPs. Can make full fist without difficulty. No erythema or edema of these joints.    Labs:  21  CRP <2.9  ESR 6  Cr 0.66  Alk phos 35  ALT 26  AST 16  WBC 6.9  HGB 12.5      3/9/21  Hep B sAG negative  Hep c AB negative  HIV negative  Quant gold negative    21  Cr 0.73    21  CRP <2.9  ESR 4    2020  HLA-B27 positive  ANGELLA, ACE, lyme, treponema, NMO/AQP4, MOG all negative/normal    Imagin21                                                                Impression:    1. Little change in the peripherally enhancing right optic nerve  lesion, nearly appearing cystic centrally, which does not have the  classic appearance of optic neuritis. Differences in findings may be  secondary to technique.  2. No acute abnormalities. The left optic nerve appears unremarkable..     Pathology:  21  FINAL DIAGNOSIS:  "  A. Right optic nerve sheath, biopsy: Chronic inflammation. No evidence of   lymphoma.      Per patient, she believes her condition is \"status quo\" - However, she did suffer what is presumed to be a \"steroid induced manic episode\" last Monday for which she received care from her PCP.  She started on lithium and symptoms have dissipated. She does have a a f/u with PCP today.    Alexa is a 60 year old who is being evaluated via a billable video visit.      How would you like to obtain your AVS? MyChart    Will anyone else be joining your video visit? No      Video-Visit Details    Type of service:  Video Visit  Start: 04/15/2021 09:40 am   Stop: 04/15/2021 10:14 am    Originating Location (pt. Location): Home    Distant Location (provider location):  [facility] [place]     Platform used for Video Visit: Brooke Fuentes MD  Rheumatology            "

## 2021-04-15 NOTE — LETTER
4/15/2021       RE: Alexa Kline  [address] [address]     Dear Colleague,    Thank you for referring your patient, Alexa Kline, to the [facility] [place] [facility]. Please see a copy of my visit note below.      Outpatient Rheumatology Follow-up  This visit was conducted via synchronous video visit due to the current COVID-19 crisis to reduce patient risk.  Verbal consent was obtained and is documented below.    Name: Alexa Kline    MRN [id number]   Today's date: 4/15/2021         Reason for follow-up: Assist with steroid sparing agent for optic perineuritis   Ophthalmologist: Morgan Griffiths MD        Assessment & Plan:   60 year old female with right eye vision changes found to have ring enhancement of right optic nerve consistent with optic perineuritis which has been steroid responsive. Patient followed closely by Vandana Griffiths and Octaviano. Patient referred to rheumatology for assistance with steroid sparing agent in this context. Published options in this disease have included AZA, methotrexate, TNF-inhibitors, and ant-CD20 therapy. While not robust AZA>methotrexate and then very little with tnf and rituxan. So, obtained TPMT enzyme activity level, which was slightly low. So, reduced dose and slow ramp of AZA pursued with frequent lab checks. AZA 25mg daily started on 3/22/21 with labs/ uptitration q3 weeks.     Currently on 25mg BID, with last dose increase on 4/7 after lab check at that time. Will obtain labs on/around 4/28. If normal, will go up to 50mg in the AM and 25mg in the PM. Labs again 3 weeks later. If normal, will increase to final dose of 50mg in the AM and 50mg in the PM (roughly 1.5mg/kg/day, which is 1-2mg/kg/d goal - 50% reduced typical goal given TPMT enzyme activity).     High risk drug: AZA  - q3 week labs while uptitrating to goal  -No evidence of toxicity by labs or  symptoms    Long term current steroid use: 5mg daily prendisone    PLAN:  1) continue on 25mg BID AZA  2) Labs around 4/28, will contact with results/ dose increase to 50mg in the AM and 25mg in the PM  3) Continue on prednisone 5mg daily (refilled) - I will contact Dr. Griffiths re: further taper  4) f/u in 6 weeks    Johnnie Fuentes MD  Rheumatology     Subjective:   Interval history 4/15/21: As of this AM, currently on 5mg of prednisone. Has noticed increased appetite. Had manic episode last week. Started in middle of march. Was on 10mg of prednisone at that time. Started on lithium on April 6th. Currently on 600mg lithium daily by PCP. Sleep is improving. Still with her new baseline vision loss on the right. No improvement, as expected. No pain. Tolerating AZA without issues. No n/v. New intermittent diarrhea, since lithium. No blood in stool. Just last few days. When she went up to 600mg, noticed this change in stool. No rash. No oral ulcers. No fever, chills, weight loss, interval infections.     HPI from initial consult:  In 2020 developed blurred vision -> decreased vision out of the bottom of the right eye when reading -> referred to Dr. Brumfield who found ring enhancement of the right optic nerve. Had CNS demyelinating disease serologies sent for NMO/AQP4 and MOG which were negative. ANGELLA negative. ESR was in normal range at 8. Lyme and treponema negative. ACE in normal range at 12. Cysticercosis IgG negative. HLA-B27 checked and positive. She was started on high dose prednisone with some initial stabilization and possible improvement but then in feb 2021 had active inflammation causing vision changes and increased optic nerve enhancement. High dose prednisone started again with optic nerve sheath biopsy performed. Patient was seen by Dr. Griffiths on 3/9/21 for evaluation who refers the patient to me today to discuss steroid sparing agents that would be appropriate for the patient. Saw PCP last week, bone density  scan ordered. Also will have spinal MR today. To be done today. When she went of off steroids, has had some hair loss. No scarring plaques. Noticed more hair in the drain. Has had HA which is a new symptom since this current recurrence, has not been able to shake since her bx. Feels like she is straining. Cannot see out of the bottom half of the eye. No hearing changes. No rashes. No photosensitivity. Does still get hot flushes, for maybe one year. No night sweats. Does report weight loss, in June developed diarrhea had infectious work-up which was negative. Thought it was related to salad she was eating at the same. Lost 10 pounds at that time, slowly coming back now that she is on steroids. No ongoing diarrhea. No blood in the stool. No n/v. No recurrent hemoptysis, sinusitis, PNA. This year did develop minor epistaxis, thought it was due to cold dry weather here. Stopped the nasonex and stopped. No further bleeds. No oral or nasal ulcers. No change in strength or sensation in arms/hands. Has baseline lymphedema so some sensation changes in feet. No skin tightening. No raynauds. No nail changes. No dry mouth. Has occasional swelling in her knees. Not red or hot. No pain. Also has occasional swelling at her ankles.      Past Medical History  Past Medical History:   Diagnosis Date     Bronchiectasis (H)      Hyperglycemia      Lymphedema      Malignant neoplasm of endometrium (H)      Optic perineuritis      Orbital lesion      Osteopenia      Seizure (H)      Uterine cancer (H)      Vitreous floater        Past Surgical History  Past Surgical History:   Procedure Laterality Date     REPAIR PTOSIS Bilateral      SHEATHOTOMY NERVE OPTIC Right 2/25/2021    Procedure: Right optic nerve sheath biopsy;  Surgeon: Morelia Matos MD;  Location: [facility] OR       Medications  Current Outpatient Medications   Medication     acetaminophen (TYLENOL) 500 MG tablet     ALBUTEROL IN     ASMANEX, 30 METERED DOSES, 220 MCG/INH  inhaler     azaTHIOprine (IMURAN) 50 MG tablet     biotin 1000 MCG TABS tablet     calcium 600-200 MG-UNIT TABS     cetirizine (ZYRTEC) 10 MG tablet     cycloSPORINE (RESTASIS) 0.05 % ophthalmic emulsion     erythromycin (ROMYCIN) 5 MG/GM ophthalmic ointment     famotidine (PEPCID) 20 MG tablet     hydrOXYzine (ATARAX) 25 MG tablet     linaclotide (LINZESS) 145 MCG capsule     Melatonin-Pyridoxine (MELATONEX PO)     mometasone (NASONEX) 50 MCG/ACT nasal spray     Multiple Vitamins-Minerals (MULTIVITAMIN ADULT PO)     Omega-3 Fatty Acids (FISH OIL) 1200 MG capsule     Vitamin D, Cholecalciferol, 25 MCG (1000 UT) TABS     No current facility-administered medications for this visit.      Allergies   No Known Allergies    Family History  Mother with RA. HLA-B27+ father and sister with inflammatory eye disease.    Social History  , 2 kids. Healthy. Drinks 3-5 drinks per week during the pandemic. Never smoker. No drug use now or in the past.      Objective:    Physical exam:  No vitals for this video visit. Vitals reviewed in Epic.  GEN: Sitting up at table. NAD  HEENT: no facial rash, sclera clear, no inflammatory nose/ external ear changes  CV: no upper extremity dependent edema  Pulm: speaking in full sentences, no cough, no audible wheezing, no use of accessory muscles  Abdomen: not distended  Skin: no acute cutaneous lesions  MSK: full active ROM of bilateral shoulders, elbows, wrists, MCPs, PIPs, DIPs. Can make full fist without difficulty. No erythema or edema of these joints.    Labs:  21  CRP <2.9  ESR 6  Cr 0.66  Alk phos 35  ALT 26  AST 16  WBC 6.9  HGB 12.5      3/9/21  Hep B sAG negative  Hep c AB negative  HIV negative  Quant gold negative    21  Cr 0.73    21  CRP <2.9  ESR 4    2020  HLA-B27 positive  ANGELLA, ACE, lyme, treponema, NMO/AQP4, MOG all negative/normal    Imagin21                                                                Impression:    1. Little  "change in the peripherally enhancing right optic nerve  lesion, nearly appearing cystic centrally, which does not have the  classic appearance of optic neuritis. Differences in findings may be  secondary to technique.  2. No acute abnormalities. The left optic nerve appears unremarkable..     Pathology:  2/25/21  FINAL DIAGNOSIS:   A. Right optic nerve sheath, biopsy: Chronic inflammation. No evidence of   lymphoma.      Per patient, she believes her condition is \"status quo\" - However, she did suffer what is presumed to be a \"steroid induced manic episode\" last Monday for which she received care from her PCP.  She started on lithium and symptoms have dissipated. She does have a a f/u with PCP today.    Alexa is a 60 year old who is being evaluated via a billable video visit.      How would you like to obtain your AVS? MyChart    Will anyone else be joining your video visit? No      Video-Visit Details    Type of service:  Video Visit  Start: 04/15/2021 09:40 am   Stop: 04/15/2021 10:14 am    Originating Location (pt. Location): Home    Distant Location (provider location):  [facility] [place]     Platform used for Video Visit: Brooke Fuentes MD  Rheumatology        "

## 2021-04-18 NOTE — PATIENT INSTRUCTIONS
LM for patient to return call.  Need to know what medications she has tried/failed in the past for the GERD before doing PA.  Rx for omeprazole was sent in last week because the pantoprazole was not covered, and now they are saying a PA is needed for the omeprazole?    Her insurance is [employer].  ID is [id number] and their phone number is [phone].  Will attempt PA once patient calls back with meds tried. Leidy Fuentes,      PLAN:  1) continue on 25mg twice daily azathioprine  2) Labs around 4/28, will contact with results/ dose increase to 50mg in the AM and 25mg in the PM if labs continue to look good  3) Continue on prednisone 5mg daily (refilled) - I will contact Dr. Griffiths re: further taper  4) follow-up in 6 weeks. My clinic will call to schedule this and your lab appointment.    Please let me know if anything comes up prior to your follow-up.    Thanks.    Johnnie Fuentes MD  Rheumatology

## 2021-05-27 NOTE — PROGRESS NOTES
Alexa is a 60 year old who is being evaluated via a billable video visit.      How would you like to obtain your AVS? Mail a copy  If the video visit is dropped, the invitation should be resent by: Send to e-mail at: [email or web address]  Will anyone else be joining your video visit? No      Video-Visit Details    Type of service:  Video Visit    Start: 05/27/2021 07:59 am   Stop: 05/27/2021 08:26 am    Originating Location (pt. Location): Home    Distant Location (provider location):  [facility] [place]     Platform used for Video Visit: Brooke Fuentes MD  Rheumatology          Outpatient Rheumatology Follow-up  This visit was conducted via synchronous video visit due to the current COVID-19 crisis to reduce patient risk.  Verbal consent was obtained and is documented below.    Name: Alexa Kline    MRN [id number]   Today's date: May 27, 2021         Reason for follow-up: Steroid sparing agent for optic perineuritis   Ophthalmologist: Morgan Griffiths MD        Assessment & Plan:   60 year old female with right eye vision changes found to have ring enhancement of right optic nerve consistent with optic perineuritis which has been steroid responsive. Patient followed closely by Vandana Griffiths and Octaviano. Patient referred to rheumatology for assistance with steroid sparing agent in this context. Published options in this disease have included AZA, methotrexate, TNF-inhibitors, and ant-CD20 therapy. While not robust AZA>methotrexate and then very little with tnf and rituxan. So, obtained TPMT enzyme activity level, which was slightly low. So, reduced dose and slow ramp of AZA pursued with frequent lab checks. AZA 25mg daily started on 3/22/21 with labs/ uptitration q3 weeks.     Current dose of 50mg in the AM and 25mg in the PM. Most recent labs done yesterday which showed normal ESR, CRP, CBC, CMP. Tolerating the medication without issues. Will increase to goal dose of  50mg BID at this time, given her TPMT enzyme activity targeting 50% typical dose.     Will check labs in 6 weeks and then q3 months. Will plan to decrease prednisone to 2.5mg x1 month then stop.    High risk drug monitoring: Azathioprine with decreased TPMT enzyme activity    Long term steroid use: currently on 5mg daily. Had episode of acute ezio likely brought on by high dose steroids. Managed by PCP with psychiatry assistance. Further tapering to 2.5mg daily now that we are at goal of AZA    PLAN:  1) Increase AZA to 50mg BID  2) Decrease prednisone to 2.5mg daily x1 month then stop  3) Labs in 6 weeks then q3 months  4) Follow-up in 3 months, then can start to space appointments out if doing well  5) Follow-up with ophthalmology as planned    Johnnie Fuentes MD  Rheumatology     Subjective:   Interval history May 27, 2021  Still on lithium, has had one video visit in 1 month. Next visit is in mid June. Had decreased down to 300mg daily from 600mg at bedtime. Also still on trazodone. No changes. No longer with diarrhea, now with mild constipation. No change in vision. No occular pain. No scleral injection. Tolerating dose increases of AZA without issues. No cough/sore throat. No interval infections. No rash. No sick contacts. Is interested in checking her COVID antibody level. No new prednisone side effects. Had labs done yesterday. ROS otherwise negative, looking forward to getting to final dose of azathioprine.    Interval history 4/15/21: As of this AM, currently on 5mg of prednisone. Has noticed increased appetite. Had manic episode last week. Started in middle of march. Was on 10mg of prednisone at that time. Started on lithium on April 6th. Currently on 600mg lithium daily by PCP. Sleep is improving. Still with her new baseline vision loss on the right. No improvement, as expected. No pain. Tolerating AZA without issues. No n/v. New intermittent diarrhea, since lithium. No blood in stool. Just last few  days. When she went up to 600mg, noticed this change in stool. No rash. No oral ulcers. No fever, chills, weight loss, interval infections.     HPI from initial consult:  In 2020 developed blurred vision -> decreased vision out of the bottom of the right eye when reading -> referred to Dr. Brumfield who found ring enhancement of the right optic nerve. Had CNS demyelinating disease serologies sent for NMO/AQP4 and MOG which were negative. ANGELLA negative. ESR was in normal range at 8. Lyme and treponema negative. ACE in normal range at 12. Cysticercosis IgG negative. HLA-B27 checked and positive. She was started on high dose prednisone with some initial stabilization and possible improvement but then in feb 2021 had active inflammation causing vision changes and increased optic nerve enhancement. High dose prednisone started again with optic nerve sheath biopsy performed. Patient was seen by Dr. Griffiths on 3/9/21 for evaluation who refers the patient to me today to discuss steroid sparing agents that would be appropriate for the patient. Saw PCP last week, bone density scan ordered. Also will have spinal MR today. To be done today. When she went of off steroids, has had some hair loss. No scarring plaques. Noticed more hair in the drain. Has had HA which is a new symptom since this current recurrence, has not been able to shake since her bx. Feels like she is straining. Cannot see out of the bottom half of the eye. No hearing changes. No rashes. No photosensitivity. Does still get hot flushes, for maybe one year. No night sweats. Does report weight loss, in June developed diarrhea had infectious work-up which was negative. Thought it was related to salad she was eating at the same. Lost 10 pounds at that time, slowly coming back now that she is on steroids. No ongoing diarrhea. No blood in the stool. No n/v. No recurrent hemoptysis, sinusitis, PNA. This year did develop minor epistaxis, thought it was due to cold dry weather  here. Stopped the nasonex and stopped. No further bleeds. No oral or nasal ulcers. No change in strength or sensation in arms/hands. Has baseline lymphedema so some sensation changes in feet. No skin tightening. No raynauds. No nail changes. No dry mouth. Has occasional swelling in her knees. Not red or hot. No pain. Also has occasional swelling at her ankles.      Past Medical History  Past Medical History:   Diagnosis Date     Bronchiectasis (H)      Hyperglycemia      Lymphedema      Malignant neoplasm of endometrium (H)      Optic perineuritis      Orbital lesion      Osteopenia      Seizure (H)      Uterine cancer (H)      Vitreous floater        Past Surgical History  Past Surgical History:   Procedure Laterality Date     REPAIR PTOSIS Bilateral      SHEATHOTOMY NERVE OPTIC Right 2/25/2021    Procedure: Right optic nerve sheath biopsy;  Surgeon: Morelia Matos MD;  [place] [facility] [place]       Medications  Current Outpatient Medications   Medication     acetaminophen (TYLENOL) 500 MG tablet     ALBUTEROL IN     ASMANEX, 30 METERED DOSES, 220 MCG/INH inhaler     azaTHIOprine (IMURAN) 50 MG tablet     azaTHIOprine (IMURAN) 50 MG tablet     biotin 1000 MCG TABS tablet     calcium 600-200 MG-UNIT TABS     cetirizine (ZYRTEC) 10 MG tablet     cycloSPORINE (RESTASIS) 0.05 % ophthalmic emulsion     erythromycin (ROMYCIN) 5 MG/GM ophthalmic ointment     famotidine (PEPCID) 20 MG tablet     hydrOXYzine (ATARAX) 25 MG tablet     linaclotide (LINZESS) 145 MCG capsule     lithium (ESKALITH) 300 MG tablet     Melatonin-Pyridoxine (MELATONEX PO)     mometasone (NASONEX) 50 MCG/ACT nasal spray     Multiple Vitamins-Minerals (MULTIVITAMIN ADULT PO)     Omega-3 Fatty Acids (FISH OIL) 1200 MG capsule     predniSONE (DELTASONE) 2.5 MG tablet     predniSONE (DELTASONE) 20 MG tablet     traZODone (DESYREL) 50 MG tablet     Vitamin D, Cholecalciferol, 25 MCG (1000 UT) TABS     No current facility-administered medications for this  visit.      Allergies   No Known Allergies    Family History  Mother with RA. HLA-B27+ father and sister with inflammatory eye disease.    Social History  , 2 kids. Healthy. Drinks 3-5 drinks per week during the pandemic. Never smoker. No drug use now or in the past.      Objective:    Physical exam:  No vitals for this video visit. Vitals reviewed in Epic.  GEN: Sitting up at table. NAD  HEENT: no facial rash, sclera clear, no inflammatory nose/ external ear changes  CV: no upper extremity dependent edema  Pulm: speaking in full sentences, no cough, no audible wheezing, no use of accessory muscles  Abdomen: not distended  Skin: no acute cutaneous lesions  MSK: full active ROM of bilateral shoulders, elbows, wrists, MCPs, PIPs, DIPs. Can make full fist without difficulty. No erythema or edema of these joints.    Labs:  Results for orders placed or performed in visit on 05/26/21 (from the past 48 hour(s))   CBC with platelets differential   Result Value Ref Range    WBC 5.2 4.0 - 11.0 10e9/L    RBC Count 3.96 3.8 - 5.2 10e12/L    Hemoglobin 12.0 11.7 - 15.7 g/dL    Hematocrit 37.3 35.0 - 47.0 %    MCV 94 78 - 100 fl    MCH 30.3 26.5 - 33.0 pg    MCHC 32.2 31.5 - 36.5 g/dL    RDW 13.2 10.0 - 15.0 %    Platelet Count 306 150 - 450 10e9/L    % Neutrophils 52.5 %    % Lymphocytes 29.4 %    % Monocytes 14.4 %    % Eosinophils 3.3 %    % Basophils 0.4 %    Absolute Neutrophil 2.7 1.6 - 8.3 10e9/L    Absolute Lymphocytes 1.5 0.8 - 5.3 10e9/L    Absolute Monocytes 0.8 0.0 - 1.3 10e9/L    Absolute Eosinophils 0.2 0.0 - 0.7 10e9/L    Absolute Basophils 0.0 0.0 - 0.2 10e9/L    Diff Method Automated Method    Comprehensive metabolic panel   Result Value Ref Range    Sodium 138 133 - 144 mmol/L    Potassium 3.9 3.4 - 5.3 mmol/L    Chloride 105 94 - 109 mmol/L    Carbon Dioxide 29 20 - 32 mmol/L    Anion Gap 3 3 - 14 mmol/L    Glucose 76 70 - 99 mg/dL    Urea Nitrogen 12 7 - 30 mg/dL    Creatinine 0.70 0.52 - 1.04 mg/dL     GFR Estimate >90 >60 mL/min/[1.73_m2]    GFR Estimate If Black >90 >60 mL/min/[1.73_m2]    Calcium 9.0 8.5 - 10.1 mg/dL    Bilirubin Total 0.5 0.2 - 1.3 mg/dL    Albumin 4.3 3.4 - 5.0 g/dL    Protein Total 7.2 6.8 - 8.8 g/dL    Alkaline Phosphatase 37 (L) 40 - 150 U/L    ALT 24 0 - 50 U/L    AST 19 0 - 45 U/L   Erythrocyte sedimentation rate auto   Result Value Ref Range    Sed Rate 5 0 - 30 mm/h   CRP inflammation   Result Value Ref Range    CRP Inflammation <2.9 0.0 - 8.0 mg/L         21  CRP <2.9  ESR 6  Cr 0.66  Alk phos 35  ALT 26  AST 16  WBC 6.9  HGB 12.5      3/9/21  Hep B sAG negative  Hep c AB negative  HIV negative  Quant gold negative    21  Cr 0.73    21  CRP <2.9  ESR 4    2020  HLA-B27 positive  ANGELLA, ACE, lyme, treponema, NMO/AQP4, MOG all negative/normal    Imagin21                                                                Impression:    1. Little change in the peripherally enhancing right optic nerve  lesion, nearly appearing cystic centrally, which does not have the  classic appearance of optic neuritis. Differences in findings may be  secondary to technique.  2. No acute abnormalities. The left optic nerve appears unremarkable..     Pathology:  21  FINAL DIAGNOSIS:   A. Right optic nerve sheath, biopsy: Chronic inflammation. No evidence of   lymphoma.

## 2021-05-27 NOTE — LETTER
5/27/2021       RE: Alexa Kline  [address] [address]     Dear Colleague,    Thank you for referring your patient, Alexa Kline, to the [facility] [place] at [facility]. Please see a copy of my visit note below.    Alexa is a 60 year old who is being evaluated via a billable video visit.      How would you like to obtain your AVS? Mail a copy  If the video visit is dropped, the invitation should be resent by: Send to e-mail at: [email or web address]  Will anyone else be joining your video visit? No      Video-Visit Details    Type of service:  Video Visit    Start: 05/27/2021 07:59 am   Stop: 05/27/2021 08:26 am    Originating Location (pt. Location): Home    Distant Location (provider location):  [facility] [place]     Platform used for Video Visit: Brooke Fuentes MD  Rheumatology          Outpatient Rheumatology Follow-up  This visit was conducted via synchronous video visit due to the current COVID-19 crisis to reduce patient risk.  Verbal consent was obtained and is documented below.    Name: Alexa Kline    MRN [id number]   Today's date: May 27, 2021         Reason for follow-up: Steroid sparing agent for optic perineuritis   Ophthalmologist: Morgan Griffiths MD        Assessment & Plan:   60 year old female with right eye vision changes found to have ring enhancement of right optic nerve consistent with optic perineuritis which has been steroid responsive. Patient followed closely by Vandana Griffiths and Octaviano. Patient referred to rheumatology for assistance with steroid sparing agent in this context. Published options in this disease have included AZA, methotrexate, TNF-inhibitors, and ant-CD20 therapy. While not robust AZA>methotrexate and then very little with tnf and rituxan. So, obtained TPMT enzyme activity level, which was slightly low. So, reduced dose and slow  ramp of AZA pursued with frequent lab checks. AZA 25mg daily started on 3/22/21 with labs/ uptitration q3 weeks.     Current dose of 50mg in the AM and 25mg in the PM. Most recent labs done yesterday which showed normal ESR, CRP, CBC, CMP. Tolerating the medication without issues. Will increase to goal dose of 50mg BID at this time, given her TPMT enzyme activity targeting 50% typical dose.     Will check labs in 6 weeks and then q3 months. Will plan to decrease prednisone to 2.5mg x1 month then stop.    High risk drug monitoring: Azathioprine with decreased TPMT enzyme activity    Long term steroid use: currently on 5mg daily. Had episode of acute ezio likely brought on by high dose steroids. Managed by PCP with psychiatry assistance. Further tapering to 2.5mg daily now that we are at goal of AZA    PLAN:  1) Increase AZA to 50mg BID  2) Decrease prednisone to 2.5mg daily x1 month then stop  3) Labs in 6 weeks then q3 months  4) Follow-up in 3 months, then can start to space appointments out if doing well  5) Follow-up with ophthalmology as planned    Johnnie Fuentes MD  Rheumatology     Subjective:   Interval history May 27, 2021  Still on lithium, has had one video visit in 1 month. Next visit is in mid June. Had decreased down to 300mg daily from 600mg at bedtime. Also still on trazodone. No changes. No longer with diarrhea, now with mild constipation. No change in vision. No occular pain. No scleral injection. Tolerating dose increases of AZA without issues. No cough/sore throat. No interval infections. No rash. No sick contacts. Is interested in checking her COVID antibody level. No new prednisone side effects. Had labs done yesterday. ROS otherwise negative, looking forward to getting to final dose of azathioprine.    Interval history 4/15/21: As of this AM, currently on 5mg of prednisone. Has noticed increased appetite. Had manic episode last week. Started in middle of march. Was on 10mg of prednisone at  that time. Started on lithium on April 6th. Currently on 600mg lithium daily by PCP. Sleep is improving. Still with her new baseline vision loss on the right. No improvement, as expected. No pain. Tolerating AZA without issues. No n/v. New intermittent diarrhea, since lithium. No blood in stool. Just last few days. When she went up to 600mg, noticed this change in stool. No rash. No oral ulcers. No fever, chills, weight loss, interval infections.     HPI from initial consult:  In 2020 developed blurred vision -> decreased vision out of the bottom of the right eye when reading -> referred to Dr. Brumfield who found ring enhancement of the right optic nerve. Had CNS demyelinating disease serologies sent for NMO/AQP4 and MOG which were negative. ANGELLA negative. ESR was in normal range at 8. Lyme and treponema negative. ACE in normal range at 12. Cysticercosis IgG negative. HLA-B27 checked and positive. She was started on high dose prednisone with some initial stabilization and possible improvement but then in feb 2021 had active inflammation causing vision changes and increased optic nerve enhancement. High dose prednisone started again with optic nerve sheath biopsy performed. Patient was seen by Dr. Griffiths on 3/9/21 for evaluation who refers the patient to me today to discuss steroid sparing agents that would be appropriate for the patient. Saw PCP last week, bone density scan ordered. Also will have spinal MR today. To be done today. When she went of off steroids, has had some hair loss. No scarring plaques. Noticed more hair in the drain. Has had HA which is a new symptom since this current recurrence, has not been able to shake since her bx. Feels like she is straining. Cannot see out of the bottom half of the eye. No hearing changes. No rashes. No photosensitivity. Does still get hot flushes, for maybe one year. No night sweats. Does report weight loss, in June developed diarrhea had infectious work-up which was  negative. Thought it was related to salad she was eating at the same. Lost 10 pounds at that time, slowly coming back now that she is on steroids. No ongoing diarrhea. No blood in the stool. No n/v. No recurrent hemoptysis, sinusitis, PNA. This year did develop minor epistaxis, thought it was due to cold dry weather here. Stopped the nasonex and stopped. No further bleeds. No oral or nasal ulcers. No change in strength or sensation in arms/hands. Has baseline lymphedema so some sensation changes in feet. No skin tightening. No raynauds. No nail changes. No dry mouth. Has occasional swelling in her knees. Not red or hot. No pain. Also has occasional swelling at her ankles.      Past Medical History  Past Medical History:   Diagnosis Date     Bronchiectasis (H)      Hyperglycemia      Lymphedema      Malignant neoplasm of endometrium (H)      Optic perineuritis      Orbital lesion      Osteopenia      Seizure (H)      Uterine cancer (H)      Vitreous floater        Past Surgical History  Past Surgical History:   Procedure Laterality Date     REPAIR PTOSIS Bilateral      SHEATHOTOMY NERVE OPTIC Right 2/25/2021    Procedure: Right optic nerve sheath biopsy;  Surgeon: Morelia Matos MD;  Location: [facility] OR       Medications  Current Outpatient Medications   Medication     acetaminophen (TYLENOL) 500 MG tablet     ALBUTEROL IN     ASMANEX, 30 METERED DOSES, 220 MCG/INH inhaler     azaTHIOprine (IMURAN) 50 MG tablet     azaTHIOprine (IMURAN) 50 MG tablet     biotin 1000 MCG TABS tablet     calcium 600-200 MG-UNIT TABS     cetirizine (ZYRTEC) 10 MG tablet     cycloSPORINE (RESTASIS) 0.05 % ophthalmic emulsion     erythromycin (ROMYCIN) 5 MG/GM ophthalmic ointment     famotidine (PEPCID) 20 MG tablet     hydrOXYzine (ATARAX) 25 MG tablet     linaclotide (LINZESS) 145 MCG capsule     lithium (ESKALITH) 300 MG tablet     Melatonin-Pyridoxine (MELATONEX PO)     mometasone (NASONEX) 50 MCG/ACT nasal spray     Multiple  Vitamins-Minerals (MULTIVITAMIN ADULT PO)     Omega-3 Fatty Acids (FISH OIL) 1200 MG capsule     predniSONE (DELTASONE) 2.5 MG tablet     predniSONE (DELTASONE) 20 MG tablet     traZODone (DESYREL) 50 MG tablet     Vitamin D, Cholecalciferol, 25 MCG (1000 UT) TABS     No current facility-administered medications for this visit.      Allergies   No Known Allergies    Family History  Mother with RA. HLA-B27+ father and sister with inflammatory eye disease.    Social History  , 2 kids. Healthy. Drinks 3-5 drinks per week during the pandemic. Never smoker. No drug use now or in the past.      Objective:    Physical exam:  No vitals for this video visit. Vitals reviewed in Epic.  GEN: Sitting up at table. NAD  HEENT: no facial rash, sclera clear, no inflammatory nose/ external ear changes  CV: no upper extremity dependent edema  Pulm: speaking in full sentences, no cough, no audible wheezing, no use of accessory muscles  Abdomen: not distended  Skin: no acute cutaneous lesions  MSK: full active ROM of bilateral shoulders, elbows, wrists, MCPs, PIPs, DIPs. Can make full fist without difficulty. No erythema or edema of these joints.    Labs:  Results for orders placed or performed in visit on 05/26/21 (from the past 48 hour(s))   CBC with platelets differential   Result Value Ref Range    WBC 5.2 4.0 - 11.0 10e9/L    RBC Count 3.96 3.8 - 5.2 10e12/L    Hemoglobin 12.0 11.7 - 15.7 g/dL    Hematocrit 37.3 35.0 - 47.0 %    MCV 94 78 - 100 fl    MCH 30.3 26.5 - 33.0 pg    MCHC 32.2 31.5 - 36.5 g/dL    RDW 13.2 10.0 - 15.0 %    Platelet Count 306 150 - 450 10e9/L    % Neutrophils 52.5 %    % Lymphocytes 29.4 %    % Monocytes 14.4 %    % Eosinophils 3.3 %    % Basophils 0.4 %    Absolute Neutrophil 2.7 1.6 - 8.3 10e9/L    Absolute Lymphocytes 1.5 0.8 - 5.3 10e9/L    Absolute Monocytes 0.8 0.0 - 1.3 10e9/L    Absolute Eosinophils 0.2 0.0 - 0.7 10e9/L    Absolute Basophils 0.0 0.0 - 0.2 10e9/L    Diff Method Automated  Method    Comprehensive metabolic panel   Result Value Ref Range    Sodium 138 133 - 144 mmol/L    Potassium 3.9 3.4 - 5.3 mmol/L    Chloride 105 94 - 109 mmol/L    Carbon Dioxide 29 20 - 32 mmol/L    Anion Gap 3 3 - 14 mmol/L    Glucose 76 70 - 99 mg/dL    Urea Nitrogen 12 7 - 30 mg/dL    Creatinine 0.70 0.52 - 1.04 mg/dL    GFR Estimate >90 >60 mL/min/[1.73_m2]    GFR Estimate If Black >90 >60 mL/min/[1.73_m2]    Calcium 9.0 8.5 - 10.1 mg/dL    Bilirubin Total 0.5 0.2 - 1.3 mg/dL    Albumin 4.3 3.4 - 5.0 g/dL    Protein Total 7.2 6.8 - 8.8 g/dL    Alkaline Phosphatase 37 (L) 40 - 150 U/L    ALT 24 0 - 50 U/L    AST 19 0 - 45 U/L   Erythrocyte sedimentation rate auto   Result Value Ref Range    Sed Rate 5 0 - 30 mm/h   CRP inflammation   Result Value Ref Range    CRP Inflammation <2.9 0.0 - 8.0 mg/L         21  CRP <2.9  ESR 6  Cr 0.66  Alk phos 35  ALT 26  AST 16  WBC 6.9  HGB 12.5      3/9/21  Hep B sAG negative  Hep c AB negative  HIV negative  Quant gold negative    21  Cr 0.73    21  CRP <2.9  ESR 4    2020  HLA-B27 positive  ANGELLA, ACE, lyme, treponema, NMO/AQP4, MOG all negative/normal    Imagin21                                                                Impression:    1. Little change in the peripherally enhancing right optic nerve  lesion, nearly appearing cystic centrally, which does not have the  classic appearance of optic neuritis. Differences in findings may be  secondary to technique.  2. No acute abnormalities. The left optic nerve appears unremarkable..     Pathology:  21  FINAL DIAGNOSIS:   A. Right optic nerve sheath, biopsy: Chronic inflammation. No evidence of   lymphoma.            Again, thank you for allowing me to participate in the care of your patient.      Sincerely,    Johnnie Fuentes MD

## 2021-06-07 NOTE — PATIENT INSTRUCTIONS
PLAN:  1) Increase azathioprine to 50mg in the AM and 50mg in the PM  2) Decrease prednisone to 2.5mg daily x1 month then stop  3) Labs in 6 weeks then every 3 months  4) Follow-up in 3 months, then can start to space appointments out if doing well  5) Follow-up with ophthalmology as planned

## 2021-07-22 NOTE — PROGRESS NOTES
Date of Service: 10/24/19    Date of Last Visit:  10/18/18    PCP: Mariana Larson MD    Impression:  1. Abdominal swelling due to secondary lymphedema-excellent control.   2. Endometrial carcinoma (11/2012 GRANT, no chemo and no rad, complicated by abscess left groin) (7/2013 lymphatic bypass left venous/lymphatic ankle)  3. Left posterior calf Mohls melanoma surgery-(4/16 melanoma in situ resected)    Plan:  1. Questions were answered.    2. Continue toning, exercise and compression.  New compression pantyhose were written for.  3. Has Cefadroxil to take in case of infection.  If she travels and needs a refill she will call.  4. Follow up 1 year or when needed.    Time spent with patient 25 minutes with greater than 50% time in consultation, education and coordination of care.     ---------------------------------------------------------------------------------------------------------------------    Chief Complaint: left foot swelling and Mohls surgery left calf for melanoma     History of Present Illness:    Alexa Kline returns to the [facility] for left foot and abdominal swelling due to secondary lymphedema after treatment for endometrial carcinoma 11/12 with GRANT complicated by left groin abscess.  Treatment included Compression trunk and hose, MLD, education, lymphatic exercise, range of motion work, exercise and elevation when able.  7/2013 she had lymphatic bypass left venous/lymphatic ankle.  The patient was instructed to continue to use Compression trunk and compression stockings and do her toning and exercises.  She is here for her follow-up.  Since I last saw her she incurred a right foot fracture with nondisplaced fifth metatarsal avulsion fracture.  This has healed, but she just recently was able to get back to the gym and back to wearing her compression. There has been no new numbness, tingling, weakness, masses, rashes, shortness of breath or chest pain.  There have been no fevers.  There is no unexplained weight loss. shortness of breath, abdominal pain, poor appetite or chest pain. She has not noted any unusual skin lesions.      Past Medical History:   Diagnosis Date     Acquired lymphedema 3/26/2014     Acute respiratory failure (H) 3/30/2014     Attack, epileptiform (H) 3/30/2014     Below normal amount of sodium in the blood 3/30/2014     Blood glucose elevated 3/30/2014     Broken foot     lEFT     Bronchiectasis (H) 8/8/2006    Overview:  Uses zyrtec, nasonex, and asthmanex daily. Uses zpak and albuterol only as needed. Does not have asthma.       Cancer of endometrium (H) 12/7/2012     Decreased potassium in the blood 3/30/2014     Dental infection 2/11/2015     Long QT interval 3/30/2014     Melanoma (H)     L Calf     Open wound 1/29/2014       Past Surgical History:   Procedure Laterality Date     debulking       left lower sq tissue     HYSTERECTOMY       lyphaticovenular bypass left leg       MOHS SURGERY       OVARIAN CYST REMOVAL       SALPINGOOPHORECTOMY Right        Current Outpatient Medications   Medication Sig Dispense Refill     albuterol (PROVENTIL HFA;VENTOLIN HFA) 90 mcg/actuation inhaler Inhale 2 puffs as needed.        biotin 10,000 mcg cap Take 10,000 mcg by mouth daily.       calcium carbonate-vitamin D3 (CALCIUM 600 + D,3,) 600 mg calcium- 200 unit cap Take 1 capsule by mouth.       cetirizine (ZYRTEC) 10 MG tablet Take 10 mg by mouth.       cycloSPORINE (RESTASIS) 0.05 % ophthalmic emulsion 1 drop 2 (two) times a day.       inhalational spacing device Spcr For home use.       LINZESS 145 mcg cap capsule Take 1 capsule by mouth daily.  3     melatonin 10 mg Tab Take by mouth.       mometasone (ASMANEX) 220 mcg (30 doses) AePB inhaler Inhale 1 puff.       mometasone (NASONEX) 50 mcg/actuation nasal spray 1 spray.       multivitamin (ONE A DAY) per tablet Take 1 tablet by mouth.       naproxen sodium (ALEVE) 220 MG tablet Takes 1 tablet in  the am       omega-3 fatty acids-vitamin E (FISH OIL) 1,000 mg cap Take 1 capsule by mouth.       polyethylene glycol (MIRALAX) 17 gram/dose powder Take 17 g by mouth daily as needed.              No current facility-administered medications for this visit.        No Known Allergies    Social History     Socioeconomic History     Marital status:      Spouse name: Not on file     Number of children: 2     Years of education: Not on file     Highest education level: Not on file   Occupational History     Occupation: consultant   Social Needs     Financial resource strain: Not on file     Food insecurity:     Worry: Not on file     Inability: Not on file     Transportation needs:     Medical: Not on file     Non-medical: Not on file   Tobacco Use     Smoking status: Never Smoker     Smokeless tobacco: Never Used   Substance and Sexual Activity     Alcohol use: Yes     Alcohol/week: 0.8 standard drinks     Types: 1 Standard drinks or equivalent per week     Comment: moderate     Drug use: No     Sexual activity: Yes     Partners: Male     Birth control/protection: Post-menopausal   Lifestyle     Physical activity:     Days per week: Not on file     Minutes per session: Not on file     Stress: Not on file   Relationships     Social connections:     Talks on phone: Not on file     Gets together: Not on file     Attends Mormon service: Not on file     Active member of club or organization: Not on file     Attends meetings of clubs or organizations: Not on file     Relationship status: Not on file     Intimate partner violence:     Fear of current or ex partner: Not on file     Emotionally abused: Not on file     Physically abused: Not on file     Forced sexual activity: Not on file   Other Topics Concern     Not on file   Social History Narrative    Full time work at a desk. Lives in a house with  has grown children.       Family History   Problem Relation Age of Onset     Cancer Sister         breast      Bipolar disorder Mother      No Medical Problems Father      No Medical Problems Brother      No Medical Problems Sister      No Medical Problems Sister      No Medical Problems Sister      No Medical Problems Brother      No Medical Problems Brother      No Medical Problems Daughter      No Medical Problems Son        Review of Systems:    Alexa Kline no new numbess, tingling or weakness, redness or rashes, fevers, new masses, abdominal bloating or discomfort, unexplained weight loss, increased pain, new ulcers, shortness of breath and chest pain  Full 12 point review of systems was completed.    Imaging:    I personally reviewed the following imaging today and those on care everywhere, if indicated  Nothing new to review    Labs:   I personally reviewed the following labs today and those on care everywhere, if indicated    12/20/2018 hemoglobin A1c 5.3 cholesterol 247    Physical Exam:  Vitals:    10/24/19 0844   BP: 106/60   Pulse: 76   Resp: 16   Temp: 98.4  F (36.9  C)      BMI 22.27 (stable)  Weight 138 pounds    Circumferential measures:    Vasc Edema 9/27/2017 11/6/2017 4/18/2018 10/18/2018 10/24/2019   Right-just above MCP - - - - -   Right Wrist - - - - -   Right Up 10cm - - - - -   Right Up 10cm From Elbow - - - - -   Left-just above MCP - - - - -   Left Wrist - - - - -   Left Up 10cm - - - - -   Left Up 10cm From Elbow - - - - -   Right just above MTP 19.2 19.2 19.5 21 20.5   Right Ankle 20.4 19.8 20.5 20.5 20.7   Right Widest Calf 33 33.9 33 34 33.5   Right Thigh Up 10cm 46 46 46 44.2 43   Left - just above MTP 20.5 20.3 20 21 21   Left Ankle 21.3 20.7 20.5 20.7 20.6   Left Widest Calf 33.5 32.4 32.5 34.2 34   Left Thigh Up 10cm 45.5 45.5 45.6 43.8 42     Circumferential measures stable.     General:  59 y.o. female in no apparent distress.      Psych: Alert and oriented x 3.  Cooperative. Affect normal.    HEENT: Atraumatic and normocephalic.    Abdominal:  Normal bowel sounds.  No masses,  tenderness, guarding or rigidity.  No inguinal lymphadenopathy or fullness palpated.      Neurological:  Sensation is intact to light touch in legs bilaterally.  Strength testing normal in hip flexion, knee flexion, knee extension, ankle dorsiflexion and great toe extension bilaterally.     Integumentary: Skin of the legs is uniformly warm and dry. There are no unusual skin lesions.  No ulcerations are noted.  Stemmer's sign is negative.  There is no pain to palpation.  Skin looks healthy.   Nails are normal.     Lian Barney MD, ABWMS, FACCWS, [facility]  Medical Director Wound Care and Lymphedema  [facility]  [phone]

## 2021-07-22 NOTE — TELEPHONE ENCOUNTER
Please fax compression garments script to [facility]. Call patient at work with any problems [phone].  I will fax this to Dr Barney and her team.

## 2021-07-22 NOTE — TELEPHONE ENCOUNTER
Please have Dr. Barney sign compression order from 4/15 and fax to [facility].    Please update pt when completed.

## 2021-07-22 NOTE — PROGRESS NOTES
Progress Notes by Lian Barney MD at 10/29/2020  8:15 AM     Author: Lian Barney MD Service: -- Author Type: Physician    Filed: 10/29/2020 12:56 PM Encounter Date: 10/29/2020 Status: Signed    : Lian Barney MD (Physician)                     Date of Service: 10/29/20    Date of Last Visit:  10/24/19    PCP: Mariana Larson MD    Impression:  1. Abdominal swelling due to secondary lymphedema-excellent control.   2. Endometrial carcinoma (11/2012 GRANT, no chemo and no rad, complicated by abscess left groin) (7/2013 lymphatic bypass left venous/lymphatic ankle)  3. Left posterior calf Mohls melanoma surgery-(4/16 melanoma in situ resected)    Plan:  1. Questions were answered.    2. Continue toning, exercise and compression.  Has updated compression. Ding well and know program.   3. Has Cefadroxil to take in case of infection.  If she travels and needs a refill she will call.  4. Follow up 1 year or when needed.    Time spent with patient 25 minutes with greater than 50% time in consultation, education and coordination of care.     ---------------------------------------------------------------------------------------------------------------------    Chief Complaint: left foot swelling and Mohls surgery left calf for melanoma     History of Present Illness:    Alexa Kline returns to the [facility] for left foot and abdominal swelling due to secondary lymphedema after diagnosis of endometrial carcinoma 11/12  with GRANT complicated by left groin abscess and left posterior calf malignant melanoma treated with Mohls surgery (4/16).  Treatment for swelling included Compression trunk and hose, MLD, education, lymphatic exercise, range of motion work, exercise and elevation when able.  7/2013 she had lymphatic bypass left venous/lymphatic ankle.  The patient was instructed to continue to use Compression trunk and compression stockings and do  her toning and exercises.  She was trained in appropriate use of antibiotics in event of infection.   Since I last saw her she developed optic neuritis of the right eye and is being followed by Dr. Brumfield at the [facility].  She is here for her follow-up.  Today she reports her swelling is doing well. There has been no new numbness, tingling, weakness, masses, rashes, shortness of breath or chest pain. There have been no fevers.  There is no unexplained weight loss. shortness of breath, abdominal pain, poor appetite or chest pain. She has not noted any unusual skin lesions.  She lost some weight this summer after getting a diarrhea type illness, but is now stable.    Past Medical History:   Diagnosis Date   ? Acquired lymphedema 3/26/2014   ? Acute respiratory failure (H) 3/30/2014   ? Attack, epileptiform (H) 3/30/2014   ? Below normal amount of sodium in the blood 3/30/2014   ? Blood glucose elevated 3/30/2014   ? Broken foot     lEFT   ? Bronchiectasis (H) 8/8/2006    Overview:  Uses zyrtec, nasonex, and asthmanex daily. Uses zpak and albuterol only as needed. Does not have asthma.     ? Cancer of endometrium (H) 12/7/2012   ? Decreased potassium in the blood 3/30/2014   ? Dental infection 2/11/2015   ? Long QT interval 3/30/2014   ? Melanoma (H)     L Calf   ? Open wound 1/29/2014       Past Surgical History:   Procedure Laterality Date   ? debulking       left lower sq tissue   ? HYSTERECTOMY     ? lyphaticovenular bypass left leg     ? MOHS SURGERY     ? OVARIAN CYST REMOVAL     ? SALPINGOOPHORECTOMY Right        Current Outpatient Medications   Medication Sig Dispense Refill   ? albuterol (PROVENTIL HFA;VENTOLIN HFA) 90 mcg/actuation inhaler Inhale 2 puffs as needed.      ? biotin 10,000 mcg cap Take 10,000 mcg by mouth daily.     ? calcium carbonate-vitamin D3 (CALCIUM 600 + D,3,) 600 mg calcium- 200 unit cap Take 1 capsule by mouth.     ? cetirizine (ZYRTEC) 10 MG tablet Take 10 mg by mouth.     ?  cholecalciferol, vitamin D3, 50 mcg (2,000 unit) capsule Take 2,000 Units by mouth daily.     ? cycloSPORINE (RESTASIS) 0.05 % ophthalmic emulsion 1 drop 2 (two) times a day.     ? inhalational spacing device Spcr For home use.     ? LINZESS 145 mcg cap capsule Take 1 capsule by mouth daily.  3   ? melatonin 10 mg Tab Take by mouth.     ? mometasone (ASMANEX) 220 mcg (30 doses) AePB inhaler Inhale 1 puff.     ? mometasone (NASONEX) 50 mcg/actuation nasal spray 1 spray.     ? multivitamin (ONE A DAY) per tablet Take 1 tablet by mouth.     ? omega 3-dha-epa-fish oil 360-1,200 mg CpDR Take 1,200 mg by mouth daily.     ? omega-3 fatty acids-vitamin E (FISH OIL) 1,000 mg cap Take 1 capsule by mouth.     ? polyethylene glycol (MIRALAX) 17 gram/dose powder Take 17 g by mouth daily as needed.            ? predniSONE (DELTASONE) 20 MG tablet Take 10 mg by mouth daily.     ? naproxen sodium (ALEVE) 220 MG tablet Takes 1 tablet in the am       No current facility-administered medications for this visit.        No Known Allergies    Social History     Socioeconomic History   ? Marital status:      Spouse name: Not on file   ? Number of children: 2   ? Years of education: Not on file   ? Highest education level: Not on file   Occupational History   ? Occupation: consultant   Social Needs   ? Financial resource strain: Not on file   ? Food insecurity     Worry: Not on file     Inability: Not on file   ? Transportation needs     Medical: Not on file     Non-medical: Not on file   Tobacco Use   ? Smoking status: Never Smoker   ? Smokeless tobacco: Never Used   Substance and Sexual Activity   ? Alcohol use: Yes     Alcohol/week: 0.8 standard drinks     Types: 1 Standard drinks or equivalent per week     Comment: moderate   ? Drug use: No   ? Sexual activity: Yes     Partners: Male     Birth control/protection: Post-menopausal   Lifestyle   ? Physical activity     Days per week: Not on file     Minutes per session: Not on file    ? Stress: Not on file   Relationships   ? Social connections     Talks on phone: Not on file     Gets together: Not on file     Attends Yazdanism service: Not on file     Active member of club or organization: Not on file     Attends meetings of clubs or organizations: Not on file     Relationship status: Not on file   ? Intimate partner violence     Fear of current or ex partner: Not on file     Emotionally abused: Not on file     Physically abused: Not on file     Forced sexual activity: Not on file   Other Topics Concern   ? Not on file   Social History Narrative    Full time work at a desk. Lives in a house with  has grown children.       Family History   Problem Relation Age of Onset   ? Cancer Sister         breast   ? Bipolar disorder Mother    ? No Medical Problems Father    ? No Medical Problems Brother    ? No Medical Problems Sister    ? No Medical Problems Sister    ? No Medical Problems Sister    ? No Medical Problems Brother    ? No Medical Problems Brother    ? No Medical Problems Daughter    ? No Medical Problems Son        Review of Systems:    Alexa Kline no new numbess, tingling or weakness, redness or rashes, fevers, new masses, abdominal bloating or discomfort, unexplained weight loss, increased pain, new ulcers, shortness of breath and chest pain  Full 12 point review of systems was completed.    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  COMPARISON: 03/31/14  CONTRAST: Gadavist 6  TECHNIQUE: MRI brain and orbits without and with IV contrast.    FINDINGS:  No abnormal restricted diffusion to suggest acute infarct. There is increased  diffusion and T2 signal within the visualization of the midbrain, which is felt  to be on a  technical basis rather than true restricted diffusion suggesting  infarct. Brain signal morphology otherwise normal. The ventricles and sulci are  normal for age. No evidence of acute intracranial hemorrhage, mass effect, or  extra-axial collection. No cerebellar tonsillar ectopia.    Expected signal voids within the distal vertebral, basilar, and bilateral  internal carotid arteries.    Abnormal signal,, diffusion signal abnormality, enlargement and enhancement  involving the intraorbital segment of the right optic nerve, concerning for  acute optic neuritis. Normal appearance of the left optic nerve. The extraocular  muscles appear normal and symmetric. The globes are unremarkable.    The partially imaged paranasal sinuses are unremarkable. Right mastoid effusion.  The left mastoid air cells are unremarkable. The visualized skull base and  calvarium are unremarkable.    IMPRESSION:    1.  Abnormal signal and diffusion signal abnormality, enlargement and  enhancement involving the intraorbital segment of the right optic nerve.  Diagnostic considerations include acute optic neuritis, ischemic optic  neuropathy, or less likely primary optic nerve neoplasm.  2.  No evidence of acute intracranial hemorrhage, mass effect, or infarction.    Dr. Phillips discussed the results with Dr. Alexander, covering physician for Dr. Elina Chu on 9/11/2020 10:56 AM by telephone.    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  TECHNIQUE: Duplex exam performed utilizing 2D gray-scale imaging, Doppler  interrogation with color-flow and spectral waveform analysis. The percent  diameter stenosis is determined using NASCET criteria and Society of  Radiologists in Ultrasound Consensus Criteria.    FINDINGS:    RIGHT: Mild plaque at the bifurcation. The peak systolic velocity in the ICA is  less than 125  cm/sec, consistent with less than 50% stenosis. Normal velocities  in the ECA. Antegrade flow within the vertebral artery.     LEFT: Mild plaque at the bifurcation. The peak systolic velocity in the ICA is  less than 125 cm/sec, consistent with less than 50% stenosis. Normal velocities  in the ECA. Antegrade flow within the vertebral artery.    VELOCITY CHART:  CCA   Right: 111/27 cm/s   Left: 114/35 cm/s  ICA   Right: 77/28 cm/s   Left: 97/30 cm/s  ECA   Right: 115 cm/s   Left: 107 cm/s  ICA/CCA PSV Ratio   Right: 0.7   Left: 0.9    IMPRESSION:  1.  Mild plaque formation, velocities consistent with less than 50% stenosis in  the right internal carotid artery.  2.  Mild plaque formation, velocities consistent with less than 50% stenosis in  the left internal carotid artery.  3.  Flow within the vertebral arteries is antegrade.    EXAM: XR MAMMO RADHA UNI ADDL VIEWS LEFT, US BREAST UNILATERAL LEFT LIMITED  LOCATION: [facility] BREAST CENTER  DATE/TIME: 7/24/2020 2:56 PM    INDICATION: Abnormal left mammogram.  COMPARISON: [facility] back to 6/1/2015.    MAMMOGRAPHIC FINDINGS: Left full-field digital diagnostic mammogram performed.  The left breast is heterogeneously dense, which may obscure small masses. Breast  tomosynthesis was used in interpretation. There is an oval mass of the 8:00 left  breast.    ULTRASOUND FINDINGS: Targeted ultrasound of the 8:00 left breast 9 cm from the  nipple demonstrates a benign cyst measuring 6 x 2 x 6 mm. This is felt to  correspond to the mammogram finding.    IMPRESSION:    ACR BI-RADS Category 2: Benign.    Results given to the patient who should resume annual screening mammography.      Labs:   I personally reviewed the following lab results today and those on care everywhere, if indicated    12/20/2018 hemoglobin A1c 5.3 cholesterol 247    9/10/20  BUN 7 Cr 0.72  GFR>60  HbgA1c  5.4   HLA B27 +,  <0.02  SR 6  Chol 240  Trigly 105  HDL 70  LDL  149  Non-HDL  170    Physical Exam:  Vitals:     10/29/20 0829   BP: 118/74   Pulse: 72   Resp: 20   Temp: 98.4  F (36.9  C)      BMI 20.66 (stable)  Weight 128 (-10) pounds    Circumferential measures:    Vasc Edema 11/6/2017 4/18/2018 10/18/2018 10/24/2019 10/29/2020   Right-just above MCP - - - - -   Right Wrist - - - - -   Right Up 10cm - - - - -   Right Up 10cm From Elbow - - - - -   Left-just above MCP - - - - -   Left Wrist - - - - -   Left Up 10cm - - - - -   Left Up 10cm From Elbow - - - - -   Right just above MTP 19.2 19.5 21 20.5 20   Right Ankle 19.8 20.5 20.5 20.7 20.5   Right Widest Calf 33.9 33 34 33.5 32   Right Thigh Up 10cm 46 46 44.2 43 40   Left - just above MTP 20.3 20 21 21 19.8   Left Ankle 20.7 20.5 20.7 20.6 20   Left Widest Calf 32.4 32.5 34.2 34 32.5   Left Thigh Up 10cm 45.5 45.6 43.8 42 39.5     Circumferential measures very good.     General:  60 y.o. female in no apparent distress.      Psych: Alert and oriented x 3.  Cooperative. Affect normal.    HEENT: Atraumatic and normocephalic.    Abdominal:  Normal bowel sounds.  No masses, tenderness, guarding or rigidity.  No inguinal lymphadenopathy or fullness palpated.      Musculoskeletal:  Normal range of motion in hips, knees and ankles bilaterally throughout without active joint synovitis, erythema, swelling or joint laxity.    Neurological:  Sensation is intact to light touch in legs bilaterally.  Strength testing normal in hip flexion, knee flexion, knee extension, ankle dorsiflexion and great toe extension bilaterally.     Integumentary: Skin of the legs is uniformly warm and dry. There are no unusual skin lesions.  No ulcerations are noted.  Stemmer's sign is negative.  There is no pain to palpation.  Skin looks healthy.   Nails are normal.   Exam is stable.    Lian Barney MD, Founding Diplomate ABWMS, FACCWS, FAAPMR  Medical Director Wound Care and Lymphedema  [facility]  [phone]

## 2021-07-22 NOTE — PROGRESS NOTES
Date of Service: 04/18/18    Date of Last Visit:  11/06/17    PCP: Mariana Larson MD    Impression:  1.  Abdominal swelling due to secondary lymphedema-excellent control.   2.  Endometrial carcinoma (11/2012 [facility], no chemo and no rad, complicated by abscess left groin) (7/2013 lymphatic bypass left venous/lymphatic ankle)  3.  Left posterior calf Mohls surgery-melanoma. In situ.    Plan:  1. Questions were answered.    2. Continue toning and exercise.  3. Continue compression  4. Going to [place] on Sunday.  Will give antibiotics to take in case of infection.  Cefadroxil written.  5. I think she would do better with just a little bacitracin with a foam with silicone border dressing just to protect the left posterior calf wound and heal it more fully.  This was written for.  She can also then wear her compression socks over it.  6. Follow up 6 months or when needed.    Time spent with patient 15 minutes with greater than 50% time in consultation, education and coordination of care.     ---------------------------------------------------------------------------------------------------------------------    Chief Complaint: left foot swelling and recent Mohls surgery left calf      History of Present Illness:    Alexa Kline returns to the [facility] for follow up of left foot and abdominal swelling due to secondary lymphedema after treatment for endometrial carcinoma.   The patient's previous treatment has included Compression trunk and hose, MLD, education, lymphatic exercise, range of motion work, exercise and elevation when able.  7/2013 she had lymphatic bypass left venous/lymphatic ankle.  The patient was continuing to use Compression trunk and compression stockings. The swelling remained the same.  Unfortunately, she turned the left foot on Sept 18th and sustained a 5th MT base fracture of the left foot.  She was in a boot followed by podiatry.  The last time I saw her the foot was almost  healed.  She was soon able to get back to her regular exercise program and wearing compression.  Things were going well and 3 weeks ago she had Mohls surgery for in situ.  She was put on Keflex for 4 days.  The leg started to get red and she was put on  Doxycycline for 10 days.    She is now here for follow up. The leg is doing well though a little red.  She is putting a Telfa pad on it.  There has been no new numbness, tingling, weakness, masses, rashes, shortness of breath or chest pain. There has been no new fevers.  She is leaving for a 2 week trip to [place] on Sunday.  She also needs new compression garments ordered.    Past Medical History:   Diagnosis Date     Acquired lymphedema 3/26/2014     Acute respiratory failure 3/30/2014     Attack, epileptiform 3/30/2014     Below normal amount of sodium in the blood 3/30/2014     Blood glucose elevated 3/30/2014     Broken foot     lEFT     Bronchiectasis 8/8/2006    Overview:  Uses zyrtec, nasonex, and asthmanex daily. Uses zpak and albuterol only as needed. Does not have asthma.       Cancer of endometrium 12/7/2012     Decreased potassium in the blood 3/30/2014     Dental infection 2/11/2015     Long QT interval 3/30/2014     Melanoma     L Calf     Open wound 1/29/2014       Past Surgical History:   Procedure Laterality Date     debulking       left lower sq tissue     HYSTERECTOMY       lyphaticovenular bypass left leg       MOHS SURGERY       OVARIAN CYST REMOVAL       SALPINGOOPHORECTOMY Right        Current Outpatient Prescriptions   Medication Sig Dispense Refill     albuterol (PROVENTIL HFA;VENTOLIN HFA) 90 mcg/actuation inhaler Inhale 2 puffs as needed.        azithromycin (ZITHROMAX) 250 MG tablet daily as needed. Take 500 mg (2 tabs) by mouth on day 1, then 250 mg (1 tab) daily for days 2-5.       biotin 10,000 mcg cap Take 10,000 mcg by mouth daily.       calcium carbonate-vitamin D3 (CALCIUM 600 + D,3,) 600 mg calcium- 200 unit cap Take 1 capsule by  mouth.       cetirizine (ZYRTEC) 10 MG tablet Take 10 mg by mouth.       cycloSPORINE (RESTASIS) 0.05 % ophthalmic emulsion 1 drop 2 (two) times a day.       inhalational spacing device Spcr For home use.       melatonin 10 mg Tab Take by mouth.       mometasone (ASMANEX) 220 mcg (30 doses) AePB inhaler Inhale 1 puff.       mometasone (NASONEX) 50 mcg/actuation nasal spray 1 spray.       multivitamin (ONE A DAY) per tablet Take 1 tablet by mouth.       naproxen sodium (ALEVE) 220 MG tablet Takes 1 tablet in the am       omega-3 fatty acids-vitamin E (FISH OIL) 1,000 mg cap Take 1 capsule by mouth.       polyethylene glycol (MIRALAX) 17 gram/dose powder Take 17 g by mouth.       No current facility-administered medications for this visit.        No Known Allergies    Social History     Social History     Marital status:      Spouse name: N/A     Number of children: 2     Years of education: N/A     Occupational History     consultant      Social History Main Topics     Smoking status: Never Smoker     Smokeless tobacco: Never Used     Alcohol use 0.5 oz/week     1 Standard drinks or equivalent per week     Drug use: No     Sexual activity: Yes     Partners: Male     Birth control/ protection: Post-menopausal     Other Topics Concern     Not on file     Social History Narrative    Full time work at a desk. Lives in a house with  has grown children.       Family History   Problem Relation Age of Onset     Cancer Sister      breast     Bipolar disorder Mother      No Medical Problems Father      No Medical Problems Brother      No Medical Problems Sister      No Medical Problems Sister      No Medical Problems Sister      No Medical Problems Brother      No Medical Problems Brother      No Medical Problems Daughter      No Medical Problems Son        Review of Systems:  Alexa Kline no new numbess, tingling or weakness, redness or rashes, fevers, new masses, abdominal bloating or discomfort, unexplained  weight loss, increased pain, new ulcers, shortness of breath and chest pain  Full 12 point review of systems was completed.    Imaging:  None to review    Physical Exam:  Vitals:    04/18/18 0810   BP: 134/61   Pulse: 68   Resp: 18   Temp: 97.6  F (36.4  C)      BMI 21.79    Circumferential measures:    Vasc Edema 7/30/2015 9/22/2016 9/27/2017 11/6/2017   Right-just above MCP 18.5 - - -   Right Wrist 14.5 - - -   Right Up 10cm 18.6 - - -   Right Up 10cm From Elbow 24.0 - - -   Left-just above MCP 18.0 - - -   Left Wrist 14.5 - - -   Left Up 10cm 17.5 - - -   Left Up 10cm From Elbow 24.0 - - -   Right just above MTP 17.7 21.5 19.2 19.2   Right Ankle 22. 21.4 20.4 19.8   Right Widest Calf 34.5 33 33 33.9   Right Thigh Up 10cm 45.0 46 46 46   Left - just above MTP 20. 21 20.5 20.3   Left Ankle 21.5 21.5 21.3 20.7   Left Widest Calf 36.0 34 33.5 32.4   Left Thigh Up 10cm 14.2 45.5 45.5 45.5     Circumferential measures look good.     General:  57 y.o. female in no apparent distress.  Alert and oriented x 3.  Cooperative. Affect normal.     Neurological:  Sensation is intact to pin prick and light touch in both legs.  There is excellent strength in ankle dorsiflexion and great toe extension bilaterally.  This is all pain-free.    Integumentary: Skin of the legs is uniformly warm and dry. The swelling in the left foot has resolved.  There is a closed incision on the left posterior calf which is healing well.  There is no pain to palpation in the area.  Skin looks healthy.   Nails are normal.     iLan Barney MD, ABWMS, FACCWS, [facility]  Medical Director Wound Care and Lymphedema  [facility]  [phone]

## 2021-07-22 NOTE — TELEPHONE ENCOUNTER
Pt called to request new script be sent to [facility] - verified compression type, she indicated the 2 last orders I have on file are not the type she has received an order for.    She wants the panty-hose compression, NOT thigh high or capris.    Writer called [facility] and left a message to call the clinic to verify the last order patient received.    Writer will also check with MD pedro    Once correct order is verified, the order will be updated and faxed to [facility]

## 2021-07-22 NOTE — TELEPHONE ENCOUNTER
Pt called and requested recent compression order to be faxed to [facility] again. She stated they did not receive the order. Order was faxed.

## 2021-07-22 NOTE — PATIENT INSTRUCTIONS - HE
"Continue current cares.    Swelling in the legs can be caused by many reasons. No matter what the reason, treatment usually includes some type of compression. You should wear your compression socks as much as you can. Your compression should be put on first thing in the morning. Take the compression off at night. It is especially important to wear them with long periods of sitting/standing, long car rides or if you will be flying. Going without compression for even brief periods of time can be damaging to your legs and your health.  Compression socks should get replaced usually every 4-6 months. They do not need to be worn at night while in bed. If we have seen you in the last year, we can refill your sock prescription, otherwise your primary care provider is able to refill them for you. Call us with any problems or questions.   Compression Velcro may need to be replaced every 9-12 months.  Often the liners and socks need to be replaced every three or four months.  The neoprene velcro may last up to 2 years.  If the velcro becomes worn a tailor may be able to repair it for you inexpensively.    If you do a lot of standing, it is good to do calf raises to help keep the blood pumping. If you sit a lot at work, it is good to get up periodically to walk around. Elevation of the foot of your bed 4-6\" helps the blood return back to where it is needed.   Please call us if you have any questions [phone]    Thank you for choosing [facility].  "

## 2021-07-22 NOTE — PROGRESS NOTES
Pt continues to wear compression regularly. No pain or numbness. abdominal still same no change;states Pt.

## 2021-07-22 NOTE — TELEPHONE ENCOUNTER
Message requesting 3rd quarter compression garments script be sent to [facility]. Call patient when done [phone].

## 2021-07-22 NOTE — PATIENT INSTRUCTIONS - HE
"Continue current plan of care as discussed.    New prescription for compression stockings.    Swelling in the legs can be caused by many reasons. No matter what the reason, treatment usually includes some type of compression. You should wear your compression socks as much as you can. Your compression should be put on first thing in the morning. Take the compression off at night. It is especially important to wear them with long periods of sitting/standing, long car rides or if you will be flying. Going without compression for even brief periods of time can be damaging to your legs and your health.  Compression socks should get replaced usually every 4-6 months. They do not need to be worn at night while in bed. If we have seen you in the last year, we can refill your sock prescription, otherwise your primary care provider is able to refill them for you. Call us with any problems or questions.   Compression Velcro may need to be replaced every 9-12 months.  Often the liners and socks need to be replaced every three or four months.  The neoprene velcro may last up to 2 years.  If the velcro becomes worn a tailor may be able to repair it for you inexpensively.    If you do a lot of standing, it is good to do calf raises to help keep the blood pumping. If you sit a lot at work, it is good to get up periodically to walk around. Elevation of the foot of your bed 4-6\" helps the blood return back to where it is needed.   Please call us if you have any questions [phone]    [facility].    "

## 2021-07-22 NOTE — TELEPHONE ENCOUNTER
Alexa called to follow up on prescription she requested on Monday for compression stockings. Let her know this was sent over to [facility] on Monday.

## 2021-07-22 NOTE — TELEPHONE ENCOUNTER
Alexa called clinic requesting her compression stockings be refilled and sent to [facility] in . This was filled and faxed to [facility] with confirmation. Called Alexa and let her know this was completed.

## 2021-07-22 NOTE — PROGRESS NOTES
Pt continues to wear compression garment daily. No new concern. She denied pain or other discomfort. She broke right foot last June, no surgery needed. She is requesting new prescription for garment.

## 2021-07-22 NOTE — PROGRESS NOTES
Date of Service: 10/18/18    Date of Last Visit:  04/18/18    PCP: Mariana Larson MD    Impression:  1.  Abdominal swelling due to secondary lymphedema-excellent control.   2.  Endometrial carcinoma (11/2012 [facility], no chemo and no rad, complicated by abscess left groin) (7/2013 lymphatic bypass left venous/lymphatic ankle)  3.  Left posterior calf Mohls surgery-(4/16 melanoma in situ resected)-incision healed    Plan:  1. Questions were answered.    2. Continue toning and exercise.  3. Continue compression  4. Has antibiotics to take in case of infection.  Cefadroxil.  5. Follow up 1 year or when needed.    Time spent with patient 15 minutes with greater than 50% time in consultation, education and coordination of care.     ---------------------------------------------------------------------------------------------------------------------    Chief Complaint: left foot swelling and recent Mohls surgery left calf      History of Present Illness:    Alexa Kline returns to the [facility] for follow up of left foot and abdominal swelling due to secondary lymphedema after treatment for endometrial carcinoma.   The patient's previous treatment has included Compression trunk and hose, MLD, education, lymphatic exercise, range of motion work, exercise and elevation when able.  7/2013 she had lymphatic bypass left venous/lymphatic ankle.  The patient was continuing to use Compression trunk and compression stockings. The swelling remained the same.  In Sept 2017 she sustained a 5th MT base fracture of the left foot.  She eventually healed and was able to get back to her regular exercise program and wearing compression.  Things were going well and then in April 2016 she had Mohls surgery for in situ. I changed her dressings and she healed.  She was able to go to [place] and did very well.  She did not have any infection problems.  There has been no new numbness, tingling,  weakness, masses, rashes, shortness of breath or chest pain. There has been no new fevers.      Past Medical History:   Diagnosis Date     Acquired lymphedema 3/26/2014     Acute respiratory failure (H) 3/30/2014     Attack, epileptiform (H) 3/30/2014     Below normal amount of sodium in the blood 3/30/2014     Blood glucose elevated 3/30/2014     Broken foot     lEFT     Bronchiectasis (H) 8/8/2006    Overview:  Uses zyrtec, nasonex, and asthmanex daily. Uses zpak and albuterol only as needed. Does not have asthma.       Cancer of endometrium (H) 12/7/2012     Decreased potassium in the blood 3/30/2014     Dental infection 2/11/2015     Long QT interval 3/30/2014     Melanoma (H)     L Calf     Open wound 1/29/2014       Past Surgical History:   Procedure Laterality Date     debulking       left lower sq tissue     HYSTERECTOMY       lyphaticovenular bypass left leg       MOHS SURGERY       OVARIAN CYST REMOVAL       SALPINGOOPHORECTOMY Right        Current Outpatient Prescriptions   Medication Sig Dispense Refill     albuterol (PROVENTIL HFA;VENTOLIN HFA) 90 mcg/actuation inhaler Inhale 2 puffs as needed.        biotin 10,000 mcg cap Take 10,000 mcg by mouth daily.       calcium carbonate-vitamin D3 (CALCIUM 600 + D,3,) 600 mg calcium- 200 unit cap Take 1 capsule by mouth.       cetirizine (ZYRTEC) 10 MG tablet Take 10 mg by mouth.       cycloSPORINE (RESTASIS) 0.05 % ophthalmic emulsion 1 drop 2 (two) times a day.       inhalational spacing device Spcr For home use.       melatonin 10 mg Tab Take by mouth.       mometasone (NASONEX) 50 mcg/actuation nasal spray 1 spray.       multivitamin (ONE A DAY) per tablet Take 1 tablet by mouth.       naproxen sodium (ALEVE) 220 MG tablet Takes 1 tablet in the am       omega-3 fatty acids-vitamin E (FISH OIL) 1,000 mg cap Take 1 capsule by mouth.       polyethylene glycol (MIRALAX) 17 gram/dose powder Take 17 g by mouth.       azithromycin (ZITHROMAX) 250 MG tablet daily as  needed. Take 500 mg (2 tabs) by mouth on day 1, then 250 mg (1 tab) daily for days 2-5.       mometasone (ASMANEX) 220 mcg (30 doses) AePB inhaler Inhale 1 puff.       No current facility-administered medications for this visit.        No Known Allergies    Social History     Social History     Marital status:      Spouse name: N/A     Number of children: 2     Years of education: N/A     Occupational History     consultant      Social History Main Topics     Smoking status: Never Smoker     Smokeless tobacco: Never Used     Alcohol use 0.5 oz/week     1 Standard drinks or equivalent per week      Comment: moderate     Drug use: No     Sexual activity: Yes     Partners: Male     Birth control/ protection: Post-menopausal     Other Topics Concern     Not on file     Social History Narrative    Full time work at a desk. Lives in a house with  has grown children.       Family History   Problem Relation Age of Onset     Cancer Sister      breast     Bipolar disorder Mother      No Medical Problems Father      No Medical Problems Brother      No Medical Problems Sister      No Medical Problems Sister      No Medical Problems Sister      No Medical Problems Brother      No Medical Problems Brother      No Medical Problems Daughter      No Medical Problems Son        Review of Systems:    Alexa Kline no new numbess, tingling or weakness, redness or rashes, fevers, new masses, abdominal bloating or discomfort, unexplained weight loss, increased pain, new ulcers, shortness of breath and chest pain  Full 12 point review of systems was completed.    Imaging:  None to review    Physical Exam:  Vitals:    10/18/18 0804   BP: 122/78   Pulse: 92   Resp: 16   Temp: 99  F (37.2  C)      BMI 21.43 (stable)    Circumferential measures:    Vasc Edema 9/22/2016 9/27/2017 11/6/2017 4/18/2018 10/18/2018   Right-just above MCP - - - - -   Right Wrist - - - - -   Right Up 10cm - - - - -   Right Up 10cm From Elbow - - - - -    Left-just above MCP - - - - -   Left Wrist - - - - -   Left Up 10cm - - - - -   Left Up 10cm From Elbow - - - - -   Right just above MTP 21.5 19.2 19.2 19.5 21   Right Ankle 21.4 20.4 19.8 20.5 20.5   Right Widest Calf 33 33 33.9 33 34   Right Thigh Up 10cm 46 46 46 46 44.2   Left - just above MTP 21 20.5 20.3 20 21   Left Ankle 21.5 21.3 20.7 20.5 20.7   Left Widest Calf 34 33.5 32.4 32.5 34.2   Left Thigh Up 10cm 45.5 45.5 45.5 45.6 43.8     Circumferential measures look good.     General:  58 y.o. female in no apparent distress.      Psych: Alert and oriented x 3.  Cooperative. Affect normal.     Abdominal:  Normal bowel sounds.  No masses, tenderness, guarding or rigidity.  No inguinal lymphadenopathy or fullness palpated.      Neurological:  Sensation is intact to light touch in legs bilaterally.    Integumentary: Skin of the legs is uniformly warm and dry. There incision on the left posterior calf has healed.  There is no pain to palpation in the area.  Skin looks healthy.   Nails are normal.     Lian Barney MD, ABWMS, FACCWS, [facility]  Medical Director Wound Care and Lymphedema  [facility]  [phone]

## 2021-07-22 NOTE — TELEPHONE ENCOUNTER
Pt called and requested a refill of compression be faxed to [facility]. LOV was 10/2019. Refill sent.

## 2021-07-22 NOTE — TELEPHONE ENCOUNTER
Patient requesting compression order be refilled and sent to [facility] (fax [phone]). Left message that we will have Dr. Barney sign tomorrow and then fax.

## 2021-08-20 NOTE — PROGRESS NOTES
Alexa is a 60 year old who is being evaluated via a billable video visit.      How would you like to obtain your AVS? MyChart  If the video visit is dropped, the invitation should be resent by: Text to cell phone: [phone]  Will anyone else be joining your video visit? No      Video-Visit Details    Type of service:  Video Visit  Start: 08/20/2021 01:00 pm   Stop: 08/20/2021 01:25 pm    Originating Location (pt. Location): Home    Distant Location (provider location):  [facility]HEUMATOLOGY [facility]IN[facility] [place]     Platform used for Video Visit: Brooke Fuentes MD  Rheumatology              Outpatient Rheumatology Follow-up  This visit was conducted via synchronous video visit due to the current COVID-19 crisis to reduce patient risk.  Verbal consent was obtained and is documented below.    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 Patient followed closely by Vandana Griffiths and Octaviano. Patient referred to rheumatology for assistance with steroid sparing agent in this context. Published options in this disease have included AZA, methotrexate, TNF-inhibitors, and ant-CD20 therapy. While not robust, AZA>methotrexate and then very little with tnf and rituxan. So, obtained TPMT enzyme activity level, which was slightly low. So, reduced dose and slow ramp of AZA pursued with frequent lab checks. AZA 25mg daily started on 3/22/21 with labs/ uptitration q3 weeks with current/goal dose of 50mg BID, given her TPMT enzyme activity targeting 50% typical dose.  Has successfully tapered off of prednisone without return of her disease which remains quiet by symptoms and  labs at this time.    High risk drug monitoring: Azathioprine with decreased TPMT enzyme activity  - Most recent labs on 8/10/21 with WBC of 6.4, HGB 13.4, and PLT of 312. Creat 0.7. LFTs on 9/10 with normal ALT at 21 and AST at 29.   -Routine lab monitoring due again on 11/10.   -No side effects or toxicity by symptoms or labs at this time    PLAN:  Labs on 11/10/21  Continue on AZA 50mg BID  Follow-up in 4 months  Labs on 11/10  Follow-up with Dr Brumfield on 3/16/22, already scheduled    Johnnie Fuentes MD  Rheumatology     Subjective:   8/20/21  Still with baseline vision loss from onset of disease as expected, though no change/worsening. Continues to tolerate AZA without issues. No interval infections. Continues to have difficulty sleeping, and follows closely with FP/psychiatry. Remains on lithium. Still not back to her baseline pre-prednisone. Went back to work for 1 month, and was able to do well. ROS otherwise negative.     Interval history May 27, 2021  Still on lithium, has had one video visit in 1 month. Next visit is in mid June. Had decreased down to 300mg daily from 600mg at bedtime. Also still on trazodone. No changes. No longer with diarrhea, now with mild constipation. No change in vision. No occular pain. No scleral injection. Tolerating dose increases of AZA without issues. No cough/sore throat. No interval infections. No rash. No sick contacts. Is interested in checking her COVID antibody level. No new prednisone side effects. Had labs done yesterday. ROS otherwise negative, looking forward to getting to final dose of azathioprine.    Interval history 4/15/21: As of this AM, currently on 5mg of prednisone. Has noticed increased appetite. Had manic episode last week. Started in middle of march. Was on 10mg of prednisone at that time. Started on lithium on April 6th. Currently on 600mg lithium daily by PCP. Sleep is improving. Still with her new baseline vision loss on the right. No improvement, as  expected. No pain. Tolerating AZA without issues. No n/v. New intermittent diarrhea, since lithium. No blood in stool. Just last few days. When she went up to 600mg, noticed this change in stool. No rash. No oral ulcers. No fever, chills, weight loss, interval infections.     HPI from initial consult:  In 2020 developed blurred vision -> decreased vision out of the bottom of the right eye when reading -> referred to Dr. Brumfield who found ring enhancement of the right optic nerve. Had CNS demyelinating disease serologies sent for NMO/AQP4 and MOG which were negative. ANGELLA negative. ESR was in normal range at 8. Lyme and treponema negative. ACE in normal range at 12. Cysticercosis IgG negative. HLA-B27 checked and positive. She was started on high dose prednisone with some initial stabilization and possible improvement but then in feb 2021 had active inflammation causing vision changes and increased optic nerve enhancement. High dose prednisone started again with optic nerve sheath biopsy performed. Patient was seen by Dr. Griffiths on 3/9/21 for evaluation who refers the patient to me today to discuss steroid sparing agents that would be appropriate for the patient. Saw PCP last week, bone density scan ordered. Also will have spinal MR today. To be done today. When she went of off steroids, has had some hair loss. No scarring plaques. Noticed more hair in the drain. Has had HA which is a new symptom since this current recurrence, has not been able to shake since her bx. Feels like she is straining. Cannot see out of the bottom half of the eye. No hearing changes. No rashes. No photosensitivity. Does still get hot flushes, for maybe one year. No night sweats. Does report weight loss, in June developed diarrhea had infectious work-up which was negative. Thought it was related to salad she was eating at the same. Lost 10 pounds at that time, slowly coming back now that she is on steroids. No ongoing diarrhea. No blood in the  stool. No n/v. No recurrent hemoptysis, sinusitis, PNA. This year did develop minor epistaxis, thought it was due to cold dry weather here. Stopped the nasonex and stopped. No further bleeds. No oral or nasal ulcers. No change in strength or sensation in arms/hands. Has baseline lymphedema so some sensation changes in feet. No skin tightening. No raynauds. No nail changes. No dry mouth. Has occasional swelling in her knees. Not red or hot. No pain. Also has occasional swelling at her ankles.      Past Medical History  Past Medical History:   Diagnosis Date     Acquired lymphedema 3/26/2014     Acute respiratory failure (H) 3/30/2014     Attack, epileptiform (H) 3/30/2014     Below normal amount of sodium in the blood 3/30/2014     Blood glucose elevated 3/30/2014     Broken foot     lEFT     Bronchiectasis (H)      Bronchiectasis (H) 8/8/2006    Overview:  Uses zyrtec, nasonex, and asthmanex daily. Uses zpak and albuterol only as needed. Does not have asthma.       Cancer of endometrium (H) 12/7/2012     Decreased potassium in the blood 3/30/2014     Dental infection 2/11/2015     Hyperglycemia      Long QT interval 3/30/2014     Lymphedema      Malignant neoplasm of endometrium (H)      Melanoma (H)     L Calf     Open wound 1/29/2014     Optic perineuritis      Orbital lesion      Osteopenia      Seizure (H)      Uterine cancer (H)      Vitreous floater        Past Surgical History  Past Surgical History:   Procedure Laterality Date     HYSTERECTOMY       MOHS MICROGRAPHIC PROCEDURE       OTHER SURGICAL HISTORY      debulking left lower sq tissue     OTHER SURGICAL HISTORY      lyphaticovenular bypass left leg     OVARIAN CYST REMOVAL       REPAIR PTOSIS Bilateral      SALPINGOOPHORECTOMY Right      SHEATHOTOMY NERVE OPTIC Right 2/25/2021    Procedure: Right optic nerve sheath biopsy;  Surgeon: Morelia Matos MD;  Location: [facility] [place]       Medications  Current Outpatient Medications   Medication     ALBUTEROL IN      ASMANEX, 30 METERED DOSES, 220 MCG/INH inhaler     azaTHIOprine (IMURAN) 50 MG tablet     biotin 1000 MCG TABS tablet     calcium 600-200 MG-UNIT TABS     cetirizine (ZYRTEC) 10 MG tablet     cycloSPORINE (RESTASIS) 0.05 % ophthalmic emulsion     linaclotide (LINZESS) 145 MCG capsule     Melatonin-Pyridoxine (MELATONEX PO)     mometasone (NASONEX) 50 MCG/ACT nasal spray     Multiple Vitamins-Minerals (MULTIVITAMIN ADULT PO)     Omega-3 Fatty Acids (FISH OIL) 1200 MG capsule     Vitamin D, Cholecalciferol, 25 MCG (1000 UT) TABS     acetaminophen (TYLENOL) 500 MG tablet     azaTHIOprine (IMURAN) 50 MG tablet     famotidine (PEPCID) 20 MG tablet     lithium (ESKALITH) 300 MG tablet     traZODone (DESYREL) 50 MG tablet     No current facility-administered medications for this visit.     Allergies   No Known Allergies    Family History  Mother with RA. HLA-B27+ father and sister with inflammatory eye disease.    Social History  , 2 kids. Healthy. Drinks 3-5 drinks per week during the pandemic. Never smoker. No drug use now or in the past.      Objective:    Physical exam:  No vitals for this video visit. Vitals reviewed in Epic.  GEN: Sitting up at table. NAD  HEENT: no facial rash, sclera clear, no inflammatory nose/ external ear changes  CV: no upper extremity dependent edema  Pulm: speaking in full sentences, no cough, no audible wheezing, no use of accessory muscles  Abdomen: not distended  Skin: no acute cutaneous lesions  MSK: full active ROM of bilateral shoulders, elbows, wrists, MCPs, PIPs, DIPs. Can make full fist without difficulty. No erythema or edema of these joints.    Labs:  WBC   Date Value Ref Range Status   07/08/2021 5.5 4.0 - 11.0 10e9/L Final     Hemoglobin   Date Value Ref Range Status   07/08/2021 12.2 11.7 - 15.7 g/dL Final     Platelet Count   Date Value Ref Range Status   07/08/2021 266 150 - 450 10e9/L Final     Creatinine   Date Value Ref Range Status   07/08/2021 1.01 0.52 - 1.04  mg/dL Final     Lab Results   Component Value Date    ALKPHOS 40 2021     AST   Date Value Ref Range Status   2021 22 0 - 45 U/L Final     Lab Results   Component Value Date    ALT 27 2021     Sed Rate   Date Value Ref Range Status   2021 6 0 - 30 mm/h Final     CRP Inflammation   Date Value Ref Range Status   2021 <2.9 0.0 - 8.0 mg/L Final     UA RESULTS:  No results for input(s): COLOR, APPEARANCE, URINEGLC, URINEBILI, URINEKETONE, SG, UBLD, URINEPH, PROTEIN, UROBILINOGEN, NITRITE, LEUKEST, RBCU, WBCU in the last [id number] hours.   No results found for: ANAIGG, ANAP1, CRICKET, DNA, ENASMI, RNPIGG, ENASSA, ENASSB, C3COM, C4COM, CKTOTAL, ALDOLASE, RHF, CCPIGG, ANCA, MPOIGG, PR3IGG    21  CRP <2.9  ESR 6  Cr 0.66  Alk phos 35  ALT 26  AST 16  WBC 6.9  HGB 12.5      3/9/21  Hep B sAG negative  Hep c AB negative  HIV negative  Quant gold negative    21  Cr 0.73    21  CRP <2.9  ESR 4    2020  HLA-B27 positive  ANGELLA, ACE, lyme, treponema, NMO/AQP4, MOG all negative/normal    Imagin21                                                                Impression:    1. Little change in the peripherally enhancing right optic nerve  lesion, nearly appearing cystic centrally, which does not have the  classic appearance of optic neuritis. Differences in findings may be  secondary to technique.  2. No acute abnormalities. The left optic nerve appears unremarkable..     Pathology:  21  FINAL DIAGNOSIS:   A. Right optic nerve sheath, biopsy: Chronic inflammation. No evidence of   lymphoma.

## 2021-08-20 NOTE — LETTER
8/20/2021       RE: Alexa Kline  [address] [address]     Dear Colleague,    Thank you for referring your patient, Alexa Kline, to the [facility]HEUMATOLOGY [facility] [place] [facility]. Please see a copy of my visit note below.    Alexa is a 60 year old who is being evaluated via a billable video visit.      How would you like to obtain your AVS? MyChart  If the video visit is dropped, the invitation should be resent by: Text to cell phone: [phone]  Will anyone else be joining your video visit? No      Video-Visit Details    Type of service:  Video Visit  Start: 08/20/2021 01:00 pm   Stop: 08/20/2021 01:25 pm    Originating Location (pt. Location): Home    Distant Location (provider location):  [facility] RHEUMATOLOG[facility]     Platform used for Video Visit: Brooke Fuentes MD  Rheumatology              Outpatient Rheumatology Follow-up  This visit was conducted via synchronous video visit due to the current COVID-19 crisis to reduce patient risk.  Verbal consent was obtained and is documented below.    Name: Alexa Kline    MRN [id number]   Today's date: August 20, 2021           Reason for follow-up: Steroid sparing agent for optic perineuritis   Ophthalmologist: Morgan Griffiths MD        Assessment & Plan:   60 year old female with right eye vision changes found to have ring enhancement of right optic nerve consistent with optic perineuritis which has been steroid responsive. Patient followed closely by Vandana Griffiths and Octaviano. Patient referred to rheumatology for assistance with steroid sparing agent in this context. Published options in this disease have included AZA, methotrexate, TNF-inhibitors, and ant-CD20 therapy. While not robust, AZA>methotrexate and then very little with tnf and rituxan. So, obtained TPMT enzyme activity level, which was slightly low. So, reduced dose and slow ramp  of AZA pursued with frequent lab checks. AZA 25mg daily started on 3/22/21 with labs/ uptitration q3 weeks with current/goal dose of 50mg BID, given her TPMT enzyme activity targeting 50% typical dose.  Has successfully tapered off of prednisone without return of her disease which remains quiet by symptoms and labs at this time.    High risk drug monitoring: Azathioprine with decreased TPMT enzyme activity  - Most recent labs on 8/10/21 with WBC of 6.4, HGB 13.4, and PLT of 312. Creat 0.7. LFTs on 9/10 with normal ALT at 21 and AST at 29.   -Routine lab monitoring due again on 11/10.   -No side effects or toxicity by symptoms or labs at this time    PLAN:  Labs on 11/10/21  Continue on AZA 50mg BID  Follow-up in 4 months  Labs on 11/10  Follow-up with Dr Brumfield on 3/16/22, already scheduled    Johnnie Fuentes MD  Rheumatology     Subjective:   8/20/21  Still with baseline vision loss from onset of disease as expected, though no change/worsening. Continues to tolerate AZA without issues. No interval infections. Continues to have difficulty sleeping, and follows closely with FP/psychiatry. Remains on lithium. Still not back to her baseline pre-prednisone. Went back to work for 1 month, and was able to do well. ROS otherwise negative.     Interval history May 27, 2021  Still on lithium, has had one video visit in 1 month. Next visit is in mid June. Had decreased down to 300mg daily from 600mg at bedtime. Also still on trazodone. No changes. No longer with diarrhea, now with mild constipation. No change in vision. No occular pain. No scleral injection. Tolerating dose increases of AZA without issues. No cough/sore throat. No interval infections. No rash. No sick contacts. Is interested in checking her COVID antibody level. No new prednisone side effects. Had labs done yesterday. ROS otherwise negative, looking forward to getting to final dose of azathioprine.    Interval history 4/15/21: As of this AM, currently on 5mg of  prednisone. Has noticed increased appetite. Had manic episode last week. Started in middle of march. Was on 10mg of prednisone at that time. Started on lithium on April 6th. Currently on 600mg lithium daily by PCP. Sleep is improving. Still with her new baseline vision loss on the right. No improvement, as expected. No pain. Tolerating AZA without issues. No n/v. New intermittent diarrhea, since lithium. No blood in stool. Just last few days. When she went up to 600mg, noticed this change in stool. No rash. No oral ulcers. No fever, chills, weight loss, interval infections.     HPI from initial consult:  In 2020 developed blurred vision -> decreased vision out of the bottom of the right eye when reading -> referred to Dr. Brumfield who found ring enhancement of the right optic nerve. Had CNS demyelinating disease serologies sent for NMO/AQP4 and MOG which were negative. ANGELLA negative. ESR was in normal range at 8. Lyme and treponema negative. ACE in normal range at 12. Cysticercosis IgG negative. HLA-B27 checked and positive. She was started on high dose prednisone with some initial stabilization and possible improvement but then in feb 2021 had active inflammation causing vision changes and increased optic nerve enhancement. High dose prednisone started again with optic nerve sheath biopsy performed. Patient was seen by Dr. Griffiths on 3/9/21 for evaluation who refers the patient to me today to discuss steroid sparing agents that would be appropriate for the patient. Saw PCP last week, bone density scan ordered. Also will have spinal MR today. To be done today. When she went of off steroids, has had some hair loss. No scarring plaques. Noticed more hair in the drain. Has had HA which is a new symptom since this current recurrence, has not been able to shake since her bx. Feels like she is straining. Cannot see out of the bottom half of the eye. No hearing changes. No rashes. No photosensitivity. Does still get hot  flushes, for maybe one year. No night sweats. Does report weight loss, in June developed diarrhea had infectious work-up which was negative. Thought it was related to salad she was eating at the same. Lost 10 pounds at that time, slowly coming back now that she is on steroids. No ongoing diarrhea. No blood in the stool. No n/v. No recurrent hemoptysis, sinusitis, PNA. This year did develop minor epistaxis, thought it was due to cold dry weather here. Stopped the nasonex and stopped. No further bleeds. No oral or nasal ulcers. No change in strength or sensation in arms/hands. Has baseline lymphedema so some sensation changes in feet. No skin tightening. No raynauds. No nail changes. No dry mouth. Has occasional swelling in her knees. Not red or hot. No pain. Also has occasional swelling at her ankles.      Past Medical History  Past Medical History:   Diagnosis Date     Acquired lymphedema 3/26/2014     Acute respiratory failure (H) 3/30/2014     Attack, epileptiform (H) 3/30/2014     Below normal amount of sodium in the blood 3/30/2014     Blood glucose elevated 3/30/2014     Broken foot     lEFT     Bronchiectasis (H)      Bronchiectasis (H) 8/8/2006    Overview:  Uses zyrtec, nasonex, and asthmanex daily. Uses zpak and albuterol only as needed. Does not have asthma.       Cancer of endometrium (H) 12/7/2012     Decreased potassium in the blood 3/30/2014     Dental infection 2/11/2015     Hyperglycemia      Long QT interval 3/30/2014     Lymphedema      Malignant neoplasm of endometrium (H)      Melanoma (H)     L Calf     Open wound 1/29/2014     Optic perineuritis      Orbital lesion      Osteopenia      Seizure (H)      Uterine cancer (H)      Vitreous floater        Past Surgical History  Past Surgical History:   Procedure Laterality Date     HYSTERECTOMY       MOHS MICROGRAPHIC PROCEDURE       OTHER SURGICAL HISTORY      debulking left lower sq tissue     OTHER SURGICAL HISTORY      lyphaticovenular bypass  left leg     OVARIAN CYST REMOVAL       REPAIR PTOSIS Bilateral      SALPINGOOPHORECTOMY Right      SHEATHOTOMY NERVE OPTIC Right 2/25/2021    Procedure: Right optic nerve sheath biopsy;  Surgeon: Morelia Matos MD;  [place] [facility] [place]       Medications  Current Outpatient Medications   Medication     ALBUTEROL IN     ASMANEX, 30 METERED DOSES, 220 MCG/INH inhaler     azaTHIOprine (IMURAN) 50 MG tablet     biotin 1000 MCG TABS tablet     calcium 600-200 MG-UNIT TABS     cetirizine (ZYRTEC) 10 MG tablet     cycloSPORINE (RESTASIS) 0.05 % ophthalmic emulsion     linaclotide (LINZESS) 145 MCG capsule     Melatonin-Pyridoxine (MELATONEX PO)     mometasone (NASONEX) 50 MCG/ACT nasal spray     Multiple Vitamins-Minerals (MULTIVITAMIN ADULT PO)     Omega-3 Fatty Acids (FISH OIL) 1200 MG capsule     Vitamin D, Cholecalciferol, 25 MCG (1000 UT) TABS     acetaminophen (TYLENOL) 500 MG tablet     azaTHIOprine (IMURAN) 50 MG tablet     famotidine (PEPCID) 20 MG tablet     lithium (ESKALITH) 300 MG tablet     traZODone (DESYREL) 50 MG tablet     No current facility-administered medications for this visit.     Allergies   No Known Allergies    Family History  Mother with RA. HLA-B27+ father and sister with inflammatory eye disease.    Social History  , 2 kids. Healthy. Drinks 3-5 drinks per week during the pandemic. Never smoker. No drug use now or in the past.      Objective:    Physical exam:  No vitals for this video visit. Vitals reviewed in Epic.  GEN: Sitting up at table. NAD  HEENT: no facial rash, sclera clear, no inflammatory nose/ external ear changes  CV: no upper extremity dependent edema  Pulm: speaking in full sentences, no cough, no audible wheezing, no use of accessory muscles  Abdomen: not distended  Skin: no acute cutaneous lesions  MSK: full active ROM of bilateral shoulders, elbows, wrists, MCPs, PIPs, DIPs. Can make full fist without difficulty. No erythema or edema of these joints.    Labs:  WBC    Date Value Ref Range Status   2021 5.5 4.0 - 11.0 10e9/L Final     Hemoglobin   Date Value Ref Range Status   2021 12.2 11.7 - 15.7 g/dL Final     Platelet Count   Date Value Ref Range Status   2021 266 150 - 450 10e9/L Final     Creatinine   Date Value Ref Range Status   2021 1.01 0.52 - 1.04 mg/dL Final     Lab Results   Component Value Date    ALKPHOS 40 2021     AST   Date Value Ref Range Status   2021 22 0 - 45 U/L Final     Lab Results   Component Value Date    ALT 27 2021     Sed Rate   Date Value Ref Range Status   2021 6 0 - 30 mm/h Final     CRP Inflammation   Date Value Ref Range Status   2021 <2.9 0.0 - 8.0 mg/L Final     UA RESULTS:  No results for input(s): COLOR, APPEARANCE, URINEGLC, URINEBILI, URINEKETONE, SG, UBLD, URINEPH, PROTEIN, UROBILINOGEN, NITRITE, LEUKEST, RBCU, WBCU in the last 48404 hours.   No results found for: ANAIGG, ANAP1, CRICKET, DNA, ENASMI, RNPIGG, ENASSA, ENASSB, C3COM, C4COM, CKTOTAL, ALDOLASE, RHF, CCPIGG, ANCA, MPOIGG, PR3IGG    21  CRP <2.9  ESR 6  Cr 0.66  Alk phos 35  ALT 26  AST 16  WBC 6.9  HGB 12.5      3/9/21  Hep B sAG negative  Hep c AB negative  HIV negative  Quant gold negative    21  Cr 0.73    21  CRP <2.9  ESR 4    2020  HLA-B27 positive  ANGELLA, ACE, lyme, treponema, NMO/AQP4, MOG all negative/normal    Imagin21                                                                Impression:    1. Little change in the peripherally enhancing right optic nerve  lesion, nearly appearing cystic centrally, which does not have the  classic appearance of optic neuritis. Differences in findings may be  secondary to technique.  2. No acute abnormalities. The left optic nerve appears unremarkable..     Pathology:  21  FINAL DIAGNOSIS:   A. Right optic nerve sheath, biopsy: Chronic inflammation. No evidence of   lymphoma.

## 2021-09-16 NOTE — TELEPHONE ENCOUNTER
azaTHIOprine (IMURAN) 50 MG tablet      Last Written Prescription Date:  6/3/2021  Last Fill Quantity: 180 tab,   # refills: 0  Last Office Visit: 8/20/2021  Future Office visit:  12/10/2021    CBC RESULTS: Recent Labs   Lab Test 07/08/21  1553   WBC 5.5   RBC 3.98   HGB 12.2   HCT 37.3   MCV 94   MCH 30.7   MCHC 32.7   RDW 12.0          Creatinine   Date Value Ref Range Status   07/08/2021 1.01 0.52 - 1.04 mg/dL Final   ]    Liver Function Studies -   Recent Labs   Lab Test 07/08/21  1554   PROTTOTAL 6.9   ALBUMIN 4.2   BILITOTAL 0.2   ALKPHOS 40   AST 22   ALT 27       Routing refill request to provider for review/approval because:  DMARD medication.  - labs current  - CBC completed 8/10/2021 (outside lab- results in chart)  - Creatinine completed 9/10/2021 (outside lab- results in chart)  - LFT's completed 9/10/2021 (outside lab- results in chart)  - last visit 8/20/2021  - future visit 12/10/2021

## 2021-09-20 NOTE — TELEPHONE ENCOUNTER
Pt is calling to check on the status of her refill request. She is also wondering if additional labs need to be done in addition to the labs that were completed on 9/16 at [facility]. If additional labs need to be completed for refill request please call pt at [phone].

## 2021-09-20 NOTE — TELEPHONE ENCOUNTER
Spoke with patient and clarified that it was the imuran that was refilled.     Sobeida Hillman CMA   9/20/2021 3:04 PM

## 2021-10-20 NOTE — PROGRESS NOTES
"[facility]  Provider Name:  Skylar Miller LICSW         PATIENT'S NAME: Alexa Kline  PREFERRED NAME: Alexa  PRONOUNS: She/Her  MRN: [id number]  : 1960  ADDRESS: [address] [place] [id number]  ACCT. NUMBER:  [id number]  DATE OF SERVICE: 10/20/21  START TIME: 8:00am  END TIME: 9:30am  PREFERRED PHONE: [phone]    May we leave a program related message: Yes  SERVICE MODALITY:  Video Visit:      Provider verified identity through the following two step process.  Patient provided:  Patient  and Patient address    Telemedicine Visit: The patient's condition can be safely assessed and treated via synchronous audio and visual telemedicine encounter.      Reason for Telemedicine Visit: Services only offered telehealth    Originating Site (Patient Location): Patient's home    Distant Site (Provider Location): [facility] MENTAL HEALTH & ADDI[facility]    Consent:  The patient/guardian has verbally consented to: the potential risks and benefits of telemedicine (video visit) versus in person care; bill my insurance or make self-payment for services provided; and responsibility for payment of non-covered services.     Patient would like the video invitation sent by:  Send to e-mail at: [email or web address]    Mode of Communication:  Video Conference via Amwell    As the provider I attest to compliance with applicable laws and regulations related to telemedicine.    [place] ADULT Mental Health DIAGNOSTIC ASSESSMENT    Identifying Information:  Patient is a 61 year old, .  The pronoun use throughout this assessment reflects the patient's chosen pronoun.  Patient was referred for an assessment by [facility]- referring provider.  Patient attended the session with her sister Magy.     Chief Complaint:   The reason for seeking services at this time is: \"1st insomnia; then mood disorders:  Major depression and anxiety\".  The " "problem(s) began 08/12/21.  Patient reports that about a year ago she had some issues with an eye. This led to patient being place on steriords to treat the issue.  In early 2021, they discovered a lesion on the optic nerve so she had to do a biopsy and placed on steroids again.  This time patient reports she developed insomnia and manic symptoms. Patient was seen by psychiatry and trialed on different medications.  She is on a regimen that has improved her sleep issues.  Today she endorses excessive worry, she complained of calling her bank but felt she was transferred somewhere that was not related to her bank, she complained of issues with her computer or something being tampered with her computer, it was unclear to this clinician if this is how her anxiety presents. Per review of chart, I did not see any notes to indicate concern for paranoid thoughts. Patient states she feel like life is pretty unmanageable, she feels like her brain is not working, she lacks motivation. She feels she is making bad decisions and expressed some concerns about her bank account being compromised.       Per her sister Magy, the days patient does not have group structure is hard. We discussed that it may be helpful for patient to work on weekend plans and plans for day there is no structure.      Patient reports she is worried about her mother's finances (patient's sisters are now managing this), she is worried about paying her [employer] insurance (this is set up automatically but she is worried bank will not process).  Patient's sister reports this is atypical for patient. She reports patient feels hopeless about her situation.  Today patient reports she has had thoughts about not wanting to be alive anymore. She states \"how do you get out of the future? I don't have the mental energy for it\". She reports the future looks bleak.  She denies suicidal plan and intent. She denies a history of suicide attempt.  She reports she had thoughts " of SIB where she was thinking about biting down on her lips really hard. Instead, she told her  and did not self injure. She reports she has a good support system in place which includes, her , sisters, children, and friend.    Current medications per chart review  Current Outpatient Medications: per Dr. Montelongo at [facility]  1. Albuterol inhaler, inhale 2 puffs p.o. b.i.d. p.r.n. for shortness of breath.  2. Imuran 50 mg p.o. b.i.d.  3. Biotin 2500 mcg p.o. daily.  4. Calcium plus cholecalciferol 1 capsule p.o. daily.  5. Zyrtec 10 mg p.o. daily.  6. Vitamin D3 2000 units p.o. daily.  7. Klonopin 1 mg p.o. at bedtime plus 0.5 mg at bedtime p.r.n. for sleep.  8. Lexapro 10 mg p.o. daily, increased midway through PHP  9. Linzess 145 mcg p.o. daily.  10. Melatonin 10 mg p.o. at bedtime.  11. Asmanex inhaler 1 puff inhaled every day.  12. Trazodone 100 mg qHS added on admission to PHP  13. Zyprexa 10 mg qHS added midway through PHP   14. Depakote 250 mg TID added towards the end of PHP for anxiety    Patient has attempted to resolve these concerns in the past through PHP, medication management.    Social/Family History:  Patient reported they grew up in [place], [place].  They were raised by biological mother  .  Parents were always together.  Patient reported that their childhood was -7 girls in family. All supportive. Elderly parents moved closer in 2020.  Patient described their current relationships with family of origin as good, very supportive. Two sisters live near by. One sister is present today.     The patient describes their cultural background as .  Cultural influences and impact on patient's life structure, values, norms, and healthcare: N/A.  Contextual influences on patient's health include: Individual Factors -recent medication induced ezio, pt needs coping skills to manage severe anxiety.    These factors will be addressed in the Preliminary Treatment plan. Patient  identified their preferred language to be English. Patient reported they does not need the assistance of an  or other support involved in therapy.     Patient reported had no significant delays in developmental tasks.   Patient's highest education level was college graduate  .  Patient identified the following learning problems: none reported.  Modifications will not be used to assist communication in therapy.  Patient reports they are  able to understand written materials.    Patient reported the following relationship history .  Patient's current relationship status is  .   Patient identified their sexual orientation as heterosexual.  Patient reported having 2 child(babar). Patient identified siblings;friends;spouse as part of their support system.  Patient identified the quality of these relationships as stable and meaningful,  .      Patient's current living/housing situation involves staying in own home/apartment.  The immediate members of family and household include Christopher Kline, 64,spouse  and they report that housing is stable.    Patient is currently unemployed.  Patient reports their finances are obtained through spouse. Patient does identify finances as a current stressor.      Patient reported that they have not been involved with the legal system. Patient does not report being under probation/ parole/ jurisdiction. They are not under any current court jurisdiction. .    Patient's Strengths and Limitations:  Patient identified the following strengths or resources that will help them succeed in treatment: commitment to health and well being, friends / good social support, family support, insight and motivation. Things that may interfere with the patient's success in treatment include: severity of symptoms.     Personal and Family Medical History:  Patient does report a family history of mental health concerns.  Patient reports family history includes Bipolar Disorder in her mother;  Cancer in her sister; No Known Problems in her brother, brother, brother, daughter, father, sister, sister, sister, and son..     Patient does report Mental Health Diagnosis and/or Treatment.  Patient Patient reported the following previous diagnoses which include(s): an anxiety disorder;depression;othersteroid-induced ezio, in the spring.  Patient reported symptoms began last year after taking steriods of medical issues. Patient denies prior mental health issues.  Patient has received mental health services in the past:  partial hospitalization program.  Psychiatric Hospitalizations: No prior admissions.  Patient denies a history of civil commitment.  Currently, patient partial hospitalization program  receiving other mental health services.  These include psychotherapy with Dr. Destinee Junior and psychiatry with Dr. Emanuel Lopez.          Patient has had a physical exam to rule out medical causes for current symptoms.  Date of last physical exam was within the past year. Client was encouraged to follow up with PCP if symptoms were to develop. The patient has a non-[place] Primary Care Provider. Their PCP is Dr. Garza..  Patient reports the following current medical concerns: see medical history.  Patient denies any issues with pain..   There are not significant appetite / nutritional concerns / weight changes.   Patient does not report a history of head injury / trauma / cognitive impairment.      Patient reports current meds as:   Outpatient Medications Marked as Taking for the 10/20/21 encounter (Hospital Encounter) with Emily Hunter LICSW   Medication Sig     albuterol (PROAIR HFA/PROVENTIL HFA/VENTOLIN HFA) 108 (90 Base) MCG/ACT inhaler Inhale 2 puffs into the lungs     azaTHIOprine (IMURAN) 50 MG tablet Take 1 tablet (50 mg) by mouth 2 times daily *Monitoring labs every 8-12 weeks     cetirizine (ZYRTEC) 10 MG tablet Take 10 mg by mouth daily     clonazePAM (KLONOPIN) 1 MG tablet       divalproex sodium delayed-release (DEPAKOTE) 250 MG DR tablet      escitalopram (LEXAPRO) 10 MG tablet Take 10 mg by mouth     Melatonin 10 MG TABS tablet Take 10 mg by mouth     OLANZapine (ZYPREXA) 10 MG tablet      traZODone (DESYREL) 50 MG tablet TAKE 1 TABLET (50 MG) BY MOUTH AT BEDTIME, MAY REPEAT ONCE.     Vitamin D, Cholecalciferol, 25 MCG (1000 UT) TABS Take 50 mcg by mouth daily       Medication Adherence:  Patient reports taking.  taking prescribed medications as prescribed.    Patient Allergies:  No Known Allergies    Medical History:    Past Medical History:   Diagnosis Date     Acquired lymphedema 3/26/2014     Acute respiratory failure (H) 3/30/2014     Attack, epileptiform (H) 3/30/2014     Below normal amount of sodium in the blood 3/30/2014     Blood glucose elevated 3/30/2014     Broken foot     lEFT     Bronchiectasis (H)      Bronchiectasis (H) 8/8/2006    Overview:  Uses zyrtec, nasonex, and asthmanex daily. Uses zpak and albuterol only as needed. Does not have asthma.       Cancer of endometrium (H) 12/7/2012     Decreased potassium in the blood 3/30/2014     Dental infection 2/11/2015     Hyperglycemia      Long QT interval 3/30/2014     Lymphedema      Malignant neoplasm of endometrium (H)      Melanoma (H)     L Calf     Open wound 1/29/2014     Optic perineuritis      Orbital lesion      Osteopenia      Seizure (H)      Uterine cancer (H)      Vitreous floater          Current Mental Status Exam:   Appearance:  Appropriate    Eye Contact:  Good   Psychomotor:  Normal       Gait / station:  NA  Attitude / Demeanor: Cooperative  Pleasant  Speech      Rate / Production: Normal/ Responsive      Volume:  Normal  volume      Language:  intact, no problems and good  Mood:   Depressed  Sad   Affect:   Flat    Thought Content: Clear  possible paranoia   Thought Process: Coherent  Goal Directed  Logical       Associations: No loosening of associations  Insight:   Good  and Intellectual  Insight  Judgment:  Intact   Orientation:  All  Attention/concentration: Good    Rating Scales:    PHQ9:    PHQ-9 SCORE 10/18/2021   PHQ-9 Total Score MyChart 21 (Severe depression)   PHQ-9 Total Score 21       GAD7:    KEATON-7 SCORE 10/18/2021   Total Score 17 (severe anxiety)   Total Score 17     CGI:     First:Considering your total clinical experience with this particular patient population, how severe are the patient's symptoms at this time?: 5 (10/20/2021  2:00 PM)      Most recentCompared to the patient's condition at the START of treatment, this patient's condition is: 4 (10/20/2021  2:00 PM)      Substance Use:  Patient did not report a family history of substance use concerns; see medical history section for details.  Patient has not received chemical dependency treatment in the past.  Patient has not ever been to detox.      Patient is not currently receiving any chemical dependency treatment.           Substance History of use Age of first use Date of last use     Pattern and duration of use (include amounts and frequency)   Alcohol used in the past   college  REPORTS SUBSTANCE USE: N/A   Cannabis   never used     REPORTS SUBSTANCE USE: N/A     Amphetamines   never used     REPORTS SUBSTANCE USE: N/A   Cocaine/crack    never used       REPORTS SUBSTANCE USE: N/A   Hallucinogens never used         REPORTS SUBSTANCE USE: N/A   Inhalants never used         REPORTS SUBSTANCE USE: N/A   Heroin never used         REPORTS SUBSTANCE USE: N/A   Other Opiates never used     REPORTS SUBSTANCE USE: N/A   Benzodiazepine   currently use 61  REPORTS SUBSTANCE USE: N/A This is prescribed. Patient does not abuse.    Barbiturates never used     REPORTS SUBSTANCE USE: N/A   Over the counter meds never used     REPORTS SUBSTANCE USE: N/A   Caffeine never used     REPORTS SUBSTANCE USE: N/A   Nicotine  never used     REPORTS SUBSTANCE USE: N/A   Other substances not listed above:  Identify:  never used     REPORTS SUBSTANCE  USE: N/A     Patient reported the following problems as a result of their substance use: NA     CAGE- AID:    CAGE-AID Total Score 10/18/2021   Total Score 0   Total Score MyChart 0 (A total score of 2 or greater is considered clinically significant)       Substance Use: No symptoms    Based on the negative CAGE score and clinical interview there  are not indications of drug or alcohol abuse.      Significant Losses / Trauma / Abuse / Neglect Issues:   Patient did not serve in the .  There are indications or report of significant loss, trauma, abuse or neglect issues related to: are no indications and client denies any losses, trauma, abuse, or neglect concerns.  Concerns for possible neglect are not present.     Safety Assessment:   Current Safety Concerns:  [place] Suicide Severity Rating Scale (Lifetime/Recent)  [place] Suicide Severity Rating (Lifetime/Recent) 10/20/2021   1. Wish to be Dead (Lifetime) Yes   Wish to be Dead Description (Lifetime) how do you get out of the future? I don't have the mental energy for it   1. Wish to be Dead (Recent) Yes   Wish to be Dead Description (Recent) I'm not excited for this future, it's bleak     Patient denies current homicidal ideation and behaviors.  Patient denies current self-injurious ideation and behaviors.    Patient denied risk behaviors associated with substance use.  Patient denies any high risk behaviors associated with mental health symptoms.  Patient reports the following current concerns for their personal safety: None.  Patient reports there are not firearms in the house.      .    History of Safety Concerns:  Patient denied a history of homicidal ideation.     Patient denied a history of personal safety concerns.    Patient denied a history of assaultive behaviors.    Patient denied a history of sexual assault behaviors.     Patient denied a history of risk behaviors associated with substance use.  Patient denies any history of high risk  behaviors associated with mental health symptoms.  Patient reports the following protective factors: forward or future oriented thinking;dedication to family or friends;safe and stable environment;regular sleep;secure attachment;help seeking behaviors when distressed;abstinence from substances;adherence with prescribed medication;agreement to use safety plan;living with other people;sense of meaning    Risk Plan:  See Recommendations for Safety and Risk Management Plan    Review of Symptoms per patient report:  Depression: Change in sleep, Lack of interest, Change in energy level, Difficulties concentrating, Change in appetite, Psychomotor slowing or agitation, Feelings of hopelessness, Feelings of helplessness, Ruminations and Feeling sad, down, or depressed  Hina:  No Symptoms  Psychosis: Possible paranoid thoughts but this is unclear- patient describes situations where she felt person she was talking when she called her bank wasn't really from the bank and concern for fraud. Also described issues with possible someone tampering with her computer  Anxiety: Excessive worry, Nervousness,Sleep disturbance and Ruminations  Panic:  No symptoms  Post Traumatic Stress Disorder:  Experienced traumatic event -medical related    Eating Disorder: No Symptoms  ADD / ADHD:  No symptoms  Conduct Disorder: No symptoms  Autism Spectrum Disorder: No symptoms  Obsessive Compulsive Disorder: No Symptoms    Patient reports the following compulsive behaviors and treatment history: none.      Diagnostic Criteria:   A. Excessive anxiety and worry about a number of events or activities (such as work or school performance).   B. The person finds it difficult to control the worry.  C. Select 3 or more symptoms (required for diagnosis). Only one item is required in children.   - Restlessness or feeling keyed up or on edge.    - Being easily fatigued.    - Difficulty concentrating or mind going blank.    - Sleep disturbance (difficulty  falling or staying asleep, or restless unsatisfying sleep).   D. The focus of the anxiety and worry is not confined to features of an Axis I disorder.  E. The anxiety, worry, or physical symptoms cause clinically significant distress or impairment in social, occupational, or other important areas of functioning.   F. The disturbance is not due to the direct physiological effects of a substance (e.g., a drug of abuse, a medication) or a general medical condition (e.g., hyperthyroidism) and does not occur exclusively during a Mood Disorder, a Psychotic Disorder, or a Pervasive Developmental Disorder.      Functional Status:  Patient reports the following functional impairments: management of the household and or completion of tasks, relationship(s), self-care, social interactions and work / vocational responsibilities.     WHODAS:   WHODAS 2.0 Total Score 10/17/2021   Total Score 44   Total Score MyChart 44     Programmatic care:  Current LOCUS was assessed and patient needs the following level of care based on score day treatment or similar  .    Clinical Summary:  1. Reason for assessment: Patient referred by [facility] for aftercare post PHP due to anxiety and depression  .  2. Psychosocial, Cultural and Contextual Factors: recent surgeries and mental health developed due to medication  .  3. Principal DSM5 Diagnoses  (Sustained by DSM5 Criteria Listed Above):   300.02 (F41.1) Generalized Anxiety Disorder.  4. Other Diagnoses that is relevant to services:   311 (F32.9) Unspecified Depressive Disorder .  5. Provisional Diagnosis:  None at this time.  6. Prognosis: Relieve Acute Symptoms.  7. Likely consequences of symptoms if not treated: If untreated, patient's mental health will likely deteriorate and may require a higher level of care  8. Client strengths include:  good listener, insightful, motivated and support of family, friends and providers .     Recommendations: Patient is a 61 year old patient who  presents with symptoms of depression and anxiety. She recently discharged from [facility] and was referred to day treatment by the program for continued stabilization.  Patient today reports that she feels hopeless. No prior mental health history. Symptoms have not completely resolved since recent ezio due to steroid medication.  Patient is motivated and successfully completed PHP.  Day treatment-55+ track is recommended.     1. Plan for Safety and Risk Management:   A safety and risk management plan has been developed including: Patient consented to co-developed safety plan.  Safety and risk management plan was completed.  Patient agreed to use safety plan should any safety concerns arise.  A copy was given to the patient..          Report to child / adult protection services was NA.     2. Patient did not identify Mosque, ethnic or cultural issues relevant to therapy at this time     3. Initial Treatment will focus on:    Depressed Mood -   Anxiety -   Adjustment Difficulties related to: new mental health symptoms as a result of medical condition  Functional Impairment at: home  Mood Instability - .     4. Resources/Service Plan:    services are not indicated.   Modifications to assist communication are not indicated.   Additional disability accommodations are not indicated.      5. Collaboration:   Collaboration / coordination of treatment will be initiated with the following  support professionals: outpatient therapist and psychiatry.      6.  Referrals:   The following referral(s) will be initiated: 55+ [facility] Outpatient Program. Next Scheduled Appointment: 10/21/2021.     A Release of Information has been obtained for the following: emergency contact. ISABELLA Sent    7. AD:    AD:  Discussed the general effects of drugs and alcohol on health and well-being.     8. Records:   These were reviewed at time of assessment.   Information in this assessment was obtained from the medical record and  provided by  "patient and family who is a good and fair historian.    Patient will have open access to their mental health medical record.      Provider Name/ Credentials:  YINKA Kan, [facility]   October 20, 2021                               Outpatient Mental Health Services - Adult    MY COPING PLAN FOR SAFETY    PATIENT'S NAME: Alexa Kline  MRN:   [id number]    SAFETY PLAN:    Step 1: Warning signs / cues (Thoughts, images, mood, situation, behavior) that a crisis may be developing:      Thoughts: \"I can't do this anymore\", \"I just want this to end\" and \"Nothing makes it better\"    Images: None    Thinking Processes: intrusive thoughts (bothersome, unwanted thoughts that come out of nowhere)    Mood: worsening depression, hopelessness, helplessness and intense worry    Behaviors: not taking care of myself, not taking care of my responsibilities and not sleeping enough    Situations: changes in symptoms: worsening anxiety       Step 2: Coping strategies - Things I can do to take my mind off of my problems without contacting another person (relaxation technique, physical activity):      Distress Tolerance Strategies:   Walking around, breathing      Physical Activities: go for a walk    Focus on helpful thoughts:    Focus my mind on other people like family    Step 3: People and social settings that provide distraction:     Names: Nahum( ), Magy(sister), Siri (sister), Nidia (friend)     Step 4: Remind myself of people and things that are important to me and worth living for:  My family       Step 5: When I am in crisis, I can ask these people to help me use my safety plan:     Name:  Nahum( ), Magy(sister)   [facility] Crisis line- [phone]   Visit your nearest Emergency. You may also go to [facility] via the ER.      Step 6: Making the environment safe:       be around others    Step 7: Professionals or agencies I can contact during a crisis:      Suicide Prevention Lifeline: " 5-784-819-TALK (9898)    Crisis Text Line Service (available 24 hours a day, 7 days a week): Text [place] to 802353    Call  **CRISIS (842363) from a cell phone to talk to a team of professionals who can help you.    Crisis Services By County: Phone Number:   [place]     [phone]   [place]    [phone]   [place]    [phone]   [place]    [phone]   [place]    [phone]   [place] [phone]   [place]     [phone]       Call 911 or go to my nearest emergency department.     I helped develop this safety plan and agree to use it when needed.  I have been given a copy of this plan.        Today s date:  10/20/2021  Adapted from Safety Plan Template 2008 Frances Vasquez and Yossi Sanches is reprinted with the express permission of the authors.  No portion of the Safety Plan Template may be reproduced without the express, written permission.  You can contact the authors at [email or web address] or [email or web address]                    LOCUS Worksheet     Name: Alexa Kline MRN: [id number]    : 1960      Gender:  female    PMI:     Provider Name: Yonatan   Provider NPI:  [id number]     Actual level of Care Provided:  PHP    Service(s) receiving or referred to:  Day Treatment    Reason for Variance: For continuation of care post discharge from [facility]. Patient continues to struggle with symptoms of anxiety and depression and decline in her functioning.       Rating completed by: YINKA Kan, LICSW       I. Risk of Harm:   2      Low Risk of Harm    II. Functional Status:   5      Serious Impairment    III. Co-Morbidity:   2      Minor Co-Morbidity    IV - A. Recovery Environment - Level of Stress:   3      Moderately Stress Environment    IV - B. Recovery Environment - Level of Support:   2      Supportive Environment    V. Treatment and Recovery History:   3      Moderate to Equivocal Response to Treatment and Recovery Management    VI. Engagement and Recovery Project:   2       Positive Engagement and Recovery       19 Composite Score    Level of Care Recommendation:   17 to 19       High Intensity Community Based Services             Answers for HPI/ROS submitted by the patient on 10/18/2021  If you checked off any problems, how difficult have these problems made it for you to do your work, take care of things at home, or get along with other people?: Extremely difficult  PHQ9 TOTAL SCORE: 21  KEATON 7 TOTAL SCORE: 17

## 2021-10-20 NOTE — PATIENT INSTRUCTIONS
Welcome to the Adult Mental Health Outpatient Programs. Thank you for choosing us at [facility]!  Managing mental health symptoms while balancing life stressors can be a struggle. Our mental health programming will provide you the therapy, education, skills and support needed to improve your well-being and to live a healthy and more manageable lifestyle.    After completing the Diagnostic Assessment, you will receive a recommendation for a level of care or specialty service that is right for you. Our Outpatient Mental Health programs are all group-based and allow you to meet with peers who are trying to manage similar symptoms or life circumstances in a safe and therapeutic setting.    Program Recommendations for: 55+ Day Treatment  Please attend:  [facility]  at:  9am-12pm  Start date: 10/21/21  What will happen next?      You will receive a series of calls from our [facility] providers and/or schedulers to prepare you for your program participation.    1. Admission Call: Program staff will contact you after your diagnostic assessment to provide a brief check in and to complete the admission process. We will ask you about concerns that may need to be addressed right away. This could include: personal safety, insurance clarification, technology access, or another concern you may have. This call may occur days in advance or right before your first scheduled group.    2. Physical Health Screen Call:  A nurse will contact you either prior to admission or during your first week in the program to ask a few required physical health screening questions and discuss concerns as needed.   3. Provider/Scheduling Call: Program staff may also call to schedule an individual appointment with a psychiatrist or psychologist (therapist). This will depend on your needs and may be required for the program that was recommended for you. This program requirement does NOT replace your follow up with your community provider.   However, it is important that you do NOT see your community psychiatric provider on the same day that you see the program psychiatrist. If you do, this could result in a denial from your insurance. Please let us know if you have any concerns and we will help you.   4. Disability or FMLA paperwork requests: Please coordinate with your community provider to complete paperwork. This will be easiest for you. Most of the time, they want the same provider to follow you during your time off. If you do not have a community provider, please schedule an appointment with one as soon as possible. The Partial Hospitalization Program provider may assist with paperwork if you have not already established care in the community. However, this is a short-term program and responsibility for paperwork must transfer to another provider when you discharge from the Partial Hospitalization Program. We do NOT have a provider to complete paperwork in Adult Day Treatment, Adult Dual Disorder Program or 55+ Program at this time.   5. WAIT LIST: If you are placed on the Wait List following your diagnostic assessment, you will receive a phone call within a few days to discuss a tentative start date and plan.  Please contact us at the phone number listed below at any time with additional questions or if you are choosing to be removed from the Wait List.   6. What if I still have questions or cannot attend as planned? :  [phone].  We hope to be able to answer your questions during the admission call. You can also reach out to us by contacting the program directly at: [phone].  Sincerely,   Your Outpatient Adult Mental Health Program Team        Outpatient Mental Health Services - Adult     MY COPING PLAN FOR SAFETY     PATIENT'S NAME:    Alexa Kline  MRN:                           [id number]     SAFETY PLAN:     Step 1: Warning signs / cues (Thoughts, images, mood, situation, behavior) that a crisis may be developing:     ? Thoughts:  "\"I can't do this anymore\", \"I just want this to end\" and \"Nothing makes it better\"  ? Images: None  ? Thinking Processes: intrusive thoughts (bothersome, unwanted thoughts that come out of nowhere)  ? Mood: worsening depression, hopelessness, helplessness and intense worry  ? Behaviors: not taking care of myself, not taking care of my responsibilities and not sleeping enough  ? Situations: changes in symptoms: worsening anxiety   ?    Step 2: Coping strategies - Things I can do to take my mind off of my problems without contacting another person (relaxation technique, physical activity):     ? Distress Tolerance Strategies:   Walking around, breathing    ? Physical Activities: go for a walk  ? Focus on helpful thoughts:    Focus my mind on other people like family     Step 3: People and social settings that provide distraction:                 Names: Nahum( ), Magy(sister), Siri (sister), Nidia (friend)                Step 4: Remind myself of people and things that are important to me and worth living for:  My family        Step 5: When I am in crisis, I can ask these people to help me use my safety plan:                 Name:  Nahum( ), Magy(sister)              [facility] Crisis line- [phone]              Visit your nearest Emergency. You may also go to [facility] via the ER.                 Step 6: Making the environment safe:      ? be around others     Step 7: Professionals or agencies I can contact during a crisis:     ? Suicide Prevention Lifeline: 1-959-104-TALK (2276)  ? Crisis Text Line Service (available 24 hours a day, 7 days a week): Text [place] to 256360  ? Call  **CRISIS (547191) from a cell phone to talk to a team of professionals who can help you.          Crisis Services By [facility]: Phone Number:   [place]     [phone]   [place]    [phone]   [place]    [phone]   [place]    [phone]   [place]    [phone]   [place] [phone]   [place]     " [phone]      ? Call 911 or go to my nearest emergency department.             I helped develop this safety plan and agree to use it when needed.  I have been given a copy of this plan.          Today s date:  10/20/2021  Adapted from Safety Plan Template 2008 Frances Vasquez and Yossi Sanches is reprinted with the express permission of the authors.  No portion of the Safety Plan Template may be reproduced without the express, written permission.  You can contact the authors at [email or web address] or [email or web address]

## 2021-10-21 PROBLEM — F41.1 GENERALIZED ANXIETY DISORDER: Status: ACTIVE | Noted: 2021-01-01

## 2021-10-21 NOTE — GROUP NOTE
Psychotherapy Group Note    PATIENT'S NAME: Alexa Kline  MRN:   [id number]  :   1960  ACCT. NUMBER: [id number]  DATE OF SERVICE: 10/21/21  START TIME: 10:30 AM  END TIME: 11:20 AM  FACILITATOR: Khadra Jarquin LICSW  TOPIC: MH EBP Group: Coping Skills  [facility] 55+ Program  TRACK: A2                              Service Modality:  Video Visit     Telemedicine Visit: The patient's condition can be safely assessed and treated via synchronous audio and visual telemedicine encounter.      Reason for Telemedicine Visit: Services only offered telehealth    Originating Site (Patient Location): Patient's home    Distant Site (Provider Location): [facility] MENTAL HEALTH & ADDICTION SERVICES    Consent:  The patient/guardian has verbally consented to: the potential risks and benefits of telemedicine (video visit) versus in person care; bill my insurance or make self-payment for services provided; and responsibility for payment of non-covered services.     Patient would like the video invitation sent by:  My Chart    Mode of Communication:  Video Conference via Medical Zoom    As the provider I attest to compliance with applicable laws and regulations related to telemedicine.         NUMBER OF PARTICIPANTS: 7    Summary of Group / Topics Discussed:  Coping Skills: Anxiety Reduction:  Patients learned coping skills to manage anxiety.  Reviewed the benefits of applying the aforementioned coping strategies.  Patients explored how these strategies might be applied to daily stressors or distressing situations.    Patient Session Goals / Objectives:    Understand the purpose and benefits of applying anxiety reduction techniques    [place] coping skills to use to reduce and manage anxiety    Address barriers to utilizing coping skills when in distress.        Patient Participation / Response:  Fully participated with the group by sharing personal reflections / insights and openly received / provided  feedback with other participants.    Demonstrated understanding of topics discussed through group discussion and participation and Expressed understanding of the relevance / importance of coping skills at distressing times in life    Treatment Plan:  Patient has a current master individualized treatment plan.  See Epic treatment plan for more information.    Khadra Oquendo, LICSW

## 2021-10-21 NOTE — GROUP NOTE
Service Modality:  Video Visit     Telemedicine Visit: The patient's condition can be safely assessed and treated via synchronous audio and visual telemedicine encounter.       Reason for Telemedicine Visit: Services only offered telehealth and due to COVID-19.     Originating Site (Patient Location): Patient's home     Distant Site (Provider Location): Provider Remote Setting- Home Office     Consent:  The patient/guardian has verbally consented to: the potential risks and benefits of telemedicine (video visit) versus in person care; bill my insurance or make self-payment for services provided; and responsibility for payment of non-covered services.      Patient would like the video invitation sent by:  My Chart     Mode of Communication:  Video Conference via Medical Zoom     As the provider I attest to compliance with applicable laws and regulations related to telemedicine.    Psychotherapy Group Note    PATIENT'S NAME: Alexa Kline  MRN:   [id number]  :   1960  ACCT. NUMBER: [id number]  DATE OF SERVICE: 10/21/21  START TIME: 11:30 AM  END TIME: 12:20 PM  FACILITATOR: Lindsey Mederos LICSW  TOPIC:  EBP Group: Self-Awareness  [facility] 55+ Program  TRACK: A2    NUMBER OF PARTICIPANTS: 7    Summary of Group / Topics Discussed:  Self-Awareness: Self-Compassion: Patients received overview of key concepts in developing self-compassion. Patients discussed mindfulness, self-kindness, and finding common humanity. Patients identified their current approach to problems in their lives and learned skills for increasing self-compassion. Patients identified ways they can put self-compassion skills into practice and problem solve barriers to application of skills.     Patient Session Goals / Objectives:    The [place] components of self-compassion    Identify ways to practice self-compassion in daily life    Problem solve barriers to self-compassion practice      Patient Participation / Response:  Fully  participated with the group by sharing personal reflections / insights and openly received / provided feedback with other participants.    Demonstrated understanding of topics discussed through group discussion and participation, Verbalized understanding of ways to proactively manage illness and Practiced skills in session    Treatment Plan:  Patient has a current master individualized treatment plan.  See Epic treatment plan for more information.    Lindsey Mederos, LICSW

## 2021-10-21 NOTE — GROUP NOTE
Service Modality:  Video Visit     Telemedicine Visit: The patient's condition can be safely assessed and treated via synchronous audio and visual telemedicine encounter.       Reason for Telemedicine Visit: Services only offered telehealth and due to COVID-19.     Originating Site (Patient Location): Patient's home     Distant Site (Provider Location): Provider Remote Setting- Home Office     Consent:  The patient/guardian has verbally consented to: the potential risks and benefits of telemedicine (video visit) versus in person care; bill my insurance or make self-payment for services provided; and responsibility for payment of non-covered services.      Patient would like the video invitation sent by:  My Chart     Mode of Communication:  Video Conference via Medical Zoom     As the provider I attest to compliance with applicable laws and regulations related to telemedicine.      Process Group Note    PATIENT'S NAME: Alexa Kline  MRN:   [id number]  :   1960  ACCT. NUMBER: [id number]  DATE OF SERVICE: 10/21/21  START TIME:  9:30 AM  END TIME: 10:25 AM  FACILITATOR: Lindsey Mederos LICSW  TOPIC:  Process Group    Diagnoses:  300.02 (F41.1) Generalized Anxiety Disorder.  311 (F32.9) Unspecified Depressive Disorder .      [facility] 55+ Program  TRACK: A2    NUMBER OF PARTICIPANTS: 8          Data:    Session content: At the start of this group, patients were invited to check in by identifying themselves, describing their current emotional status, and identifying issues to address in this group.   Area(s) of treatment focus addressed in this session included Symptom Management, Personal Safety, Community Resources/Discharge Planning, Abstinence/Relapse Prevention, Develop / Improve Independent Living Skills and Develop Socialization / Interpersonal Relationship Skills.    Alexa started the 55+ IOP today. She was welcomed and introductions were provided. Alexa was able to self disclose to the  peers in group the mood symptoms needing admission. She completed partial program at [facility]. She reports depressed and anxious mood. Denies S/I or safety  Issues. Her daughter is flying in from [place] for support today and will be with her for five days.      Therapeutic Interventions/Treatment Strategies:  Psychotherapist offered support, feedback and validation and reinforced use of skills. Treatment modalities used include Cognitive Behavioral Therapy.    Assessment:    Patient response:   Patient responded to session by verbalizing understanding    Possible barriers to participation / learning include: and no barriers identified    Health Issues:   Yes: Chronic disease management, Associated Psychological Distress       Substance Use Review:   Substance Use: No active concerns identified.    Mental Status/Behavioral Observations  Appearance:   Appropriate   Eye Contact:   Good   Psychomotor Behavior: Normal   Attitude:   Cooperative  Interested  Orientation:   All  Speech   Rate / Production: Normal    Volume:  Normal   Mood:    Anxious  Depressed   Affect:    Appropriate   Thought Content:   Clear and Safety denies any current safety concerns including suicidal ideation, self-harm, and homicidal ideation  Thought Form:  Coherent  Goal Directed  Logical     Insight:    Good     Plan:     Safety Plan: No current safety concerns identified.  Recommended that patient call 911 or go to the local ED should there be a change in any of these risk factors.     Barriers to treatment: None identified    Patient Contracts (see media tab):  None    Substance Use: Not addressed in session     Continue or Discharge: Patient will continue in 55+ Program (55+) as planned. Patient is likely to benefit from learning and using skills as they work toward the goals identified in their treatment plan. Alexa will continue for mood stabilization and symptom management education for mental health recovery.       Lindsey BAY  [place], [facility]  October 21, 2021

## 2021-10-22 NOTE — PROGRESS NOTES
Patient Active Problem List   Diagnosis     Abdominal swelling     Acute respiratory failure (H)     Below normal amount of sodium in the blood     Blurring of visual image     Bronchiectasis (H)     Closed nondisplaced fracture of navicular bone of left foot with routine healing     Decreased potassium in the blood     Dental infection     Dermatochalasis     History of uterine cancer     Hyperglycemia     Lymphedema     Malignant melanoma of left lower extremity including hip (H)     Malignant neoplasm of endometrium (H)     Non-allergic rhinitis     Osteopenia     Prolonged QT interval     Scar condition and fibrosis of skin     Seizure (H)     Swelling of foot joint, left     Visual disturbance     Vitreous floater, right     Orbital lesion     Optic perineuritis     High risk medication use     HLA B27 positive     Generalized anxiety disorder     Plus KEATON    Current Outpatient Medications:      acetaminophen (TYLENOL) 500 MG tablet, , Disp: , Rfl:      albuterol (PROAIR HFA/PROVENTIL HFA/VENTOLIN HFA) 108 (90 Base) MCG/ACT inhaler, Inhale 2 puffs into the lungs, Disp: , Rfl:      ALBUTEROL IN, Inhale 90 mcg into the lungs once as needed, Disp: , Rfl:      ammonium lactate (AMLACTIN) 12 % external cream, 1 APPLICATION TWICE A DAY TOPICALLY, Disp: , Rfl:      ASMANEX, 30 METERED DOSES, 220 MCG/INH inhaler, INHALE 1 PUFF BY MOUTH ONCE DAILY IN THE EVENING., Disp: , Rfl:      azaTHIOprine (IMURAN) 50 MG tablet, Take 1 tablet (50 mg) by mouth 2 times daily *Monitoring labs every 8-12 weeks, Disp: 180 tablet, Rfl: 0     biotin 1000 MCG TABS tablet, Take 2,500 mcg by mouth daily, Disp: , Rfl:      calcium 600-200 MG-UNIT TABS, Take 1 tablet by mouth, Disp: , Rfl:      cetirizine (ZYRTEC) 10 MG tablet, Take 10 mg by mouth daily, Disp: , Rfl:      clonazePAM (KLONOPIN) 1 MG tablet, , Disp: , Rfl:      cycloSPORINE (RESTASIS) 0.05 % ophthalmic emulsion, Place 1 drop into both eyes 2 times daily, Disp: , Rfl:       divalproex sodium delayed-release (DEPAKOTE) 250 MG DR tablet, , Disp: , Rfl:      escitalopram (LEXAPRO) 10 MG tablet, Take 10 mg by mouth, Disp: , Rfl:      famotidine (PEPCID) 20 MG tablet, , Disp: , Rfl:      linaclotide (LINZESS) 145 MCG capsule, Take 1 capsule by mouth every morning (before breakfast), Disp: , Rfl:      lithium (ESKALITH) 300 MG tablet, TAKE 1 TABLET (300 MG) BY MOUTH AT BEDTIME FOR 5 DAYS, THEN 2 TABLETS (600 MG) AT BEDTIME., Disp: , Rfl:      Melatonin 10 MG TABS tablet, Take 10 mg by mouth, Disp: , Rfl:      Melatonin-Pyridoxine (MELATONEX PO), Take 10 mg by mouth At Bedtime, Disp: , Rfl:      mometasone (NASONEX) 50 MCG/ACT nasal spray, Spray 1 spray into both nostrils 2 times daily, Disp: , Rfl:      Multiple Vitamins-Minerals (MULTIVITAMIN ADULT PO), Take 1 tablet by mouth daily Multivitamin with Iron, Disp: , Rfl:      OLANZapine (ZYPREXA) 10 MG tablet, , Disp: , Rfl:      Omega-3 Fatty Acids (FISH OIL) 1200 MG capsule, Take 1,200 mg by mouth daily, Disp: , Rfl:      traZODone (DESYREL) 50 MG tablet, TAKE 1 TABLET (50 MG) BY MOUTH AT BEDTIME, MAY REPEAT ONCE., Disp: , Rfl:      Vitamin D, Cholecalciferol, 25 MCG (1000 UT) TABS, Take 50 mcg by mouth daily, Disp: , Rfl:   Psychiatry staffing: case discussed  Diagnosis:  As above; I note the EKG from last year had QTC interval of 448, but chart lists history of prolonged QTC, Lexapro and Trazodone can each prolong QTC; I recommend she have a repeat EKG from her primary care.

## 2021-10-22 NOTE — TELEPHONE ENCOUNTER
Writer called to conduct RN screen and also address concerns r/t EKG reading of Qtc 445 milliseconds Sep 2020.    Still taking Lexapro 10mg daily, possibly starting 2021.    PCP is Dr Larson - pt ok'd writer to send msg re: EKG Qtc interval  Psych provider  Dr Emanuel Lopez (prescribes Lexapro) - Ok'd send msg re: EKG Qtc interval    Writer sent 10/25/21 at 142pm.  ========  RN Review of Medical History / Physical Health Screen  Outpatient Mental Health Programs - [facility] 55 Program    PATIENT'S NAME: Alexa Kline  MRN:   [id number]  :   1960  ACCT. NUMBER: [id number]  CURRENT AGE:  61 year old    DATE OF DIAGNOSTIC ASSESSMENT: 10/20/21  DATE OF ADMISSION: 10/21/21     Please see Diagnostic Assessment for additional Medical History.     General Health:   Have you had any exposure to any communicable disease in the past 2-3 weeks? no     Are you aware of safe sex practices? yes   Do you have a history of seizures?     If so, do you have a seizure plan? Known triggers?     Notify patient that we will call 911 (if virtual) or a code (if in-person), if we were to witness seizure during group. yes one time during episode of hyponatremia; no concerns since    no  no    yes     Nutrition:    Are you on a special diet? If yes, please explain:  no   Do you have any concerns regarding your nutritional status? If yes, please explain:  no   Have you had any appetite changes in the last 3 months?  No     Have you had any weight loss or weight gain in the last 3 months?  Yes, how much? Lost about 10 pounds; unplanned, possibly r/t stress/insomnia, encouraged monitoring     Do you have a history of an eating disorder? no   Do you have a history of being in an eating disorder program? no     Patient height and weight recorded by RN in epic flowsheet: no No; Unable to measure  Because of temporary in-person programmatic suspension due to COVID-19 pandemic, all pt weights and heights will be  "collected through patient self-report an recorded in physical health screening progress note upon admission to the program.                            Height/Weight Review:  Patient reported height:     5'6\"   Patient reports weight:  Date last checked:  120 pounds   Any referrals/needs identified?                BMI Review:  Was the patient informed of BMI? no      Findings See above         Fall Risk:   Have you had any falls in the past 3 months? no     Do you currently use any assistive devices for mobility?   no      Additional Comments/Assessment: Pt denies seizures (current/historical), dizziness, mobility concerns. No fall risk assessed; No safety concerns r/t falls.  Has psych provider, PCP, IT scheduled    Per completion of the Medical History / Physical Health Screen, is there a recommendation to see / follow up with a primary care physician/clinic or dentist?    Yes, Recommendations:   nutritional risk      Kiley Keanrey RN  10/22/2021        "

## 2021-10-25 NOTE — GROUP NOTE
Service Modality:  Video Visit     Telemedicine Visit: The patient's condition can be safely assessed and treated via synchronous audio and visual telemedicine encounter.       Reason for Telemedicine Visit: Services only offered telehealth and due to COVID-19.     Originating Site (Patient Location): Patient's home     Distant Site (Provider Location): Provider Remote Setting- Home Office     Consent:  The patient/guardian has verbally consented to: the potential risks and benefits of telemedicine (video visit) versus in person care; bill my insurance or make self-payment for services provided; and responsibility for payment of non-covered services.      Patient would like the video invitation sent by:  My Chart     Mode of Communication:  Video Conference via Medical Zoom     As the provider I attest to compliance with applicable laws and regulations related to telemedicine.    Psychoeducation Group Note    PATIENT'S NAME: Alexa Kline  MRN:   [id number]  :   1960  ACCT. NUMBER: [id number]  DATE OF SERVICE: 10/25/21  START TIME: 11:00 AM  END TIME: 11:50 AM  FACILITATOR: Lindsey Mederos [facility]  TOPIC:  RN Group: Health Maintenance  [facility] 55+ Program  TRACK: A2    NUMBER OF PARTICIPANTS: 8    Summary of Group / Topics Discussed:  Health Maintenance: Goal Setting: Meaningful goals can bring a sense of direction and purpose in life.  They also highlight our most important values. Patients were assisted by instructor to identify short term goals to promote their mental health recovery and improve overall health and wellness. Patients were educated on SMART goal setting framework as a strategy to increase outcomes and promote success.    Patient Session Goals / Objectives:  ? Explained the key concepts of SMART goal setting framework  ? Identified three goals successfully using SMART goal setting framework  ? Reviewed concept of balance in wellness as it pertains to goal setting         Patient Participation / Response:  Fully participated with the group by sharing personal reflections / insights and openly received / provided feedback with other participants.    Demonstrated understanding of topics discussed through group discussion and participation and Verbalized understanding of health maintenance topic    Treatment Plan:  Patient has a current master individualized treatment plan.  See Epic treatment plan for more information.    Lindsey Mederos, LICSW

## 2021-10-25 NOTE — GROUP NOTE
Psychotherapy Group Note    PATIENT'S NAME: Alexa Kline  MRN:   [id number]  :   1960  ACCT. NUMBER: [id number]  DATE OF SERVICE: 10/25/21  START TIME: 10:00 AM  END TIME: 10:50 AM  FACILITATOR: Khadra Jarquin LICSW  TOPIC: MH EBP Group: Coping Skills  [facility] 55+ Program  TRACK: A2                              Service Modality:  Video Visit     Telemedicine Visit: The patient's condition can be safely assessed and treated via synchronous audio and visual telemedicine encounter.      Reason for Telemedicine Visit: Services only offered telehealth    Originating Site (Patient Location): Patient's home    Distant Site (Provider Location): [facility] MENTAL HEALTH & ADDICTION SERVICES    Consent:  The patient/guardian has verbally consented to: the potential risks and benefits of telemedicine (video visit) versus in person care; bill my insurance or make self-payment for services provided; and responsibility for payment of non-covered services.     Patient would like the video invitation sent by:  My Chart    Mode of Communication:  Video Conference via Medical Zoom    As the provider I attest to compliance with applicable laws and regulations related to telemedicine.         NUMBER OF PARTICIPANTS: 8    Summary of Group / Topics Discussed:  Coping Skills: Distraction: Patients learned to mindfully use distraction as a way to decrease heightened stress in the moment.  Patients will identified situations that necessitate healthy distraction strategies.  They explored ways to manage physical symptoms of distress using distraction. The group began to distinguish when this can be useful in their lives or when other strategies would be more relevant or helpful.    Patient Session Goals / Objectives:    Understand the purpose and benefits of using healthy distraction to decrease distress.    Process what happens in the body when using distraction strategies.    Demonstrate understanding of when  to use distraction strategies.    Explore patient s current distraction activities, and how to take a more intentional approach to the use of distraction.    Identify and problem solve barriers to applying distraction strategies.    Choose 1-2 healthy distraction strategies to apply during times of distress.        Patient Participation / Response:  Fully participated with the group by sharing personal reflections / insights and openly received / provided feedback with other participants.    Demonstrated understanding of topics discussed through group discussion and participation and Expressed understanding of the relevance / importance of coping skills at distressing times in life    Treatment Plan:  Patient has an initial individualized treatment plan that was created as part of their diagnostic assessment / admission process.  A master individualized treatment plan is in the process of being developed with the patient and multi-disciplinary care team.    ALLYSON EscuderoSW

## 2021-10-25 NOTE — GROUP NOTE
Service Modality:  Video Visit     Telemedicine Visit: The patient's condition can be safely assessed and treated via synchronous audio and visual telemedicine encounter.       Reason for Telemedicine Visit: Services only offered telehealth and due to COVID-19.     Originating Site (Patient Location): Patient's home     Distant Site (Provider Location): Provider Remote Setting- Home Office     Consent:  The patient/guardian has verbally consented to: the potential risks and benefits of telemedicine (video visit) versus in person care; bill my insurance or make self-payment for services provided; and responsibility for payment of non-covered services.      Patient would like the video invitation sent by:  My Chart     Mode of Communication:  Video Conference via Medical Zoom     As the provider I attest to compliance with applicable laws and regulations related to telemedicine.    Process Group Note    PATIENT'S NAME: Alexa Kline  MRN:   [id number]  :   1960  ACCT. NUMBER: [id number]  DATE OF SERVICE: 10/25/21  START TIME:  9:00 AM  END TIME:  9:50 AM  FACILITATOR: Lindsey Mederos LICSW  TOPIC:  Process Group    Diagnoses:  300.02 (F41.1) Generalized Anxiety Disorder.  311 (F32.9) Unspecified Depressive Disorder .      [facility] 55+ Program  TRACK: A2    NUMBER OF PARTICIPANTS: 8          Data:    Session content: At the start of this group, patients were invited to check in by identifying themselves, describing their current emotional status, and identifying issues to address in this group.   Area(s) of treatment focus addressed in this session included Symptom Management, Personal Safety, Community Resources/Discharge Planning, Abstinence/Relapse Prevention, Develop / Improve Independent Living Skills and Develop Socialization / Interpersonal Relationship Skills.    Alexa reports anxious and depressed mood with poor concentration, low motivation and low energy. Denies S/I or safety issues.  She reports having difficulty getting up and engaging in her ADLs. She processed her weekend having her daughter visiting from [place] and being on the role of support. She was insightful how she was feeling and is concerned with the limited connection to her higher power. She did attend a Sikhism service.      Therapeutic Interventions/Treatment Strategies:  Psychotherapist offered support, feedback and validation and reinforced use of skills. Treatment modalities used include Cognitive Behavioral Therapy.    Assessment:    Patient response:   Patient responded to session by verbalizing understanding    Possible barriers to participation / learning include: and no barriers identified    Health Issues:   None reported       Substance Use Review:   Substance Use: No active concerns identified.    Mental Status/Behavioral Observations  Appearance:   Appropriate   Eye Contact:   Good   Psychomotor Behavior: Normal   Attitude:   Cooperative  Interested Pleasant  Orientation:   All  Speech   Rate / Production: Normal    Volume:  Normal   Mood:    Anxious  Depressed   Affect:    Appropriate  Worrisome   Thought Content:   Clear and Safety denies any current safety concerns including suicidal ideation, self-harm, and homicidal ideation  Thought Form:  Coherent  Goal Directed  Logical     Insight:    Good     Plan:     Safety Plan: No current safety concerns identified.  Recommended that patient call 911 or go to the local ED should there be a change in any of these risk factors.     Barriers to treatment: None identified    Patient Contracts (see media tab):  None    Substance Use: Not addressed in session     Continue or Discharge: Patient will continue in 55+ Program (55+) as planned. Patient is likely to benefit from learning and using skills as they work toward the goals identified in their treatment plan. Alexa will continue for mood stabilization and symptom management education for mental health recovery.        Lindsey Mederos, [facility]  October 25, 2021

## 2021-10-25 NOTE — PROGRESS NOTES
"61 year old woman seen for psychiatry eval upon starting 55+, [facility] treatment.  Recently seen by Dr. Montelongo at [facility].  Outpatient provider is Dr. Emanuel Lopez at [facility]    \"I have not functioned well for months\".  Functioning problems started after she had high dose steroids for an eye concern.  She attempted return to work unsuccessfully in July.  She had mild anxiety preceding this.    Current Outpatient Medications   Medication     acetaminophen (TYLENOL) 500 MG tablet     albuterol (PROAIR HFA/PROVENTIL HFA/VENTOLIN HFA) 108 (90 Base) MCG/ACT inhaler     ALBUTEROL IN     ammonium lactate (AMLACTIN) 12 % external cream     ASMANEX, 30 METERED DOSES, 220 MCG/INH inhaler     azaTHIOprine (IMURAN) 50 MG tablet     biotin 1000 MCG TABS tablet     calcium 600-200 MG-UNIT TABS     cetirizine (ZYRTEC) 10 MG tablet     clonazePAM (KLONOPIN) 1 MG tablet     cycloSPORINE (RESTASIS) 0.05 % ophthalmic emulsion     divalproex sodium delayed-release (DEPAKOTE) 250 MG DR tablet     escitalopram (LEXAPRO) 10 MG tablet     famotidine (PEPCID) 20 MG tablet     linaclotide (LINZESS) 145 MCG capsule     lithium (ESKALITH) 300 MG tablet     Melatonin 10 MG TABS tablet     Melatonin-Pyridoxine (MELATONEX PO)     mometasone (NASONEX) 50 MCG/ACT nasal spray     Multiple Vitamins-Minerals (MULTIVITAMIN ADULT PO)     OLANZapine (ZYPREXA) 10 MG tablet     Omega-3 Fatty Acids (FISH OIL) 1200 MG capsule     traZODone (DESYREL) 50 MG tablet     Vitamin D, Cholecalciferol, 25 MCG (1000 UT) TABS     No current facility-administered medications for this encounter.   She is no longer on Lithium.  Dr. Montelongo started Depakote, she feels tired during the day with this.    Patient Active Problem List   Diagnosis     Abdominal swelling     Acute respiratory failure (H)     Below normal amount of sodium in the blood     Blurring of visual image     Bronchiectasis (H)     Closed nondisplaced fracture of navicular bone of left " "foot with routine healing     Decreased potassium in the blood     Dental infection     Dermatochalasis     History of uterine cancer     Hyperglycemia     Lymphedema     Malignant melanoma of left lower extremity including hip (H)     Malignant neoplasm of endometrium (H)     Non-allergic rhinitis     Osteopenia     Prolonged QT interval     Scar condition and fibrosis of skin     Seizure (H)     Swelling of foot joint, left     Visual disturbance     Vitreous floater, right     Orbital lesion     Optic perineuritis     High risk medication use     HLA B27 positive     Generalized anxiety disorder   The seizure, one, was from hyponatremia after \"a colonic\"  Current meds would suggest a Bipolar disorder, she has not been given this diagnosis.  Family History   Problem Relation Age of Onset     Glaucoma No family hx of      Macular Degeneration No family hx of      Diabetes No family hx of      Hypertension No family hx of      Cancer Sister         breast     Bipolar Disorder Mother      No Known Problems Father      No Known Problems Brother      No Known Problems Sister      No Known Problems Sister      No Known Problems Sister      No Known Problems Brother      No Known Problems Brother      No Known Problems Daughter      No Known Problems Son      HPI: Severe anxiety with worry, trouble concentrating and learning, insomnia, toes curling and legs tingling, \"whole body shaking\"    General appearance: fair  Alert.   Affect: fair  Mood: fair    Speech:  Delayed, deliberate, some latency   Eye contact:  good.    Psychomotor behavior: normal  Gait: not observed.    Abnormal movements: no mouth or facial movements noted  Delusions:  Daughter and  she has paranoia  Hallucinations:  none  Thoughts: logical  Associations: intact  Judgement: good  Insight: good  Cognitions: appear slowed  Memory:  intact in conversation  Orientation: normal    Not suicidal.    Last /71, P=63  Brain MRI 1/6/21 normal    IMP: " KEATON  Probable Bipolar 2, based on symptoms and family history  Possible neurocognitive  Her toe curling proceeded olanzapine but came after Seroquel, possibly akathisia or TD.    Plan: Start Senior day treatment  2.  Recommend checking Depakote level, CMP, CBC, Ammonia level  3.  In view of current meds and history of prolonged QTC recommend EKG for QTC  4.  She sees her provider in early November 5.  Decrease Lexapro to 5mg      Video-Visit Details    Type of service:  Video Visit    Video Start Time (time video started): 1400    Video End Time (time video stopped): 1420    Originating Location (pt. Location): Home    Distant Location (provider location): Provider remote location    Mode of Communication:  Video Conference via [facility]      Physician has received verbal consent for a Video Visit from the patient? Yes      Jostin Hook MD

## 2021-10-27 NOTE — GROUP NOTE
Psychotherapy Group Note    PATIENT'S NAME: Alexa Kline  MRN:   [id number]  :   1960  ACCT. NUMBER: [id number]  DATE OF SERVICE: 10/27/21  START TIME: 11:00 AM  END TIME: 11:50 AM  FACILITATOR: Khadra Jarquin LICSW  TOPIC: MH EBP Group: Coping Skills  [facility] 55+ Program  TRACK: A2                              Service Modality:  Video Visit     Telemedicine Visit: The patient's condition can be safely assessed and treated via synchronous audio and visual telemedicine encounter.      Reason for Telemedicine Visit: Services only offered telehealth    Originating Site (Patient Location): Patient's home    Distant Site (Provider Location): [facility] MENTAL HEALTH & ADDICTION SERVICES    Consent:  The patient/guardian has verbally consented to: the potential risks and benefits of telemedicine (video visit) versus in person care; bill my insurance or make self-payment for services provided; and responsibility for payment of non-covered services.     Patient would like the video invitation sent by:  My Chart    Mode of Communication:  Video Conference via Medical Zoom    As the provider I attest to compliance with applicable laws and regulations related to telemedicine.         NUMBER OF PARTICIPANTS: 7    Summary of Group / Topics Discussed:  Coping Skills: Worry Management part 2:  Patients learned about worry management techniques.  Reviewed the benefits of applying the aforementioned coping strategies.  Patients explored how these strategies might be applied to daily stressors or distressing situations.    Patient Session Goals / Objectives:    Understand the purpose and benefits of applying  coping strategies    [place] worry management techniques    Address barriers to utilizing coping skills when in distress.        Patient Participation / Response:  Moderately participated, sharing some personal reflections / insights and adequately adequately received / provided feedback with other  participants.    Demonstrated understanding of topics discussed through group discussion and participation    Treatment Plan:  Patient has a current master individualized treatment plan.  See Epic treatment plan for more information.    Khadra Oquendo, ALLYSONSW

## 2021-10-27 NOTE — GROUP NOTE
Psychotherapy Group Note    PATIENT'S NAME: Alexa Kline  MRN:   [id number]  :   1960  ACCT. NUMBER: [id number]  DATE OF SERVICE: 10/27/21  START TIME:  9:00 AM  END TIME:  9:50 AM  FACILITATOR: Khadra Jarquin LICSW  TOPIC: MH EBP Group: Coping Skills  [facility] 55+ Program  TRACK: A2                              Service Modality:  Video Visit     Telemedicine Visit: The patient's condition can be safely assessed and treated via synchronous audio and visual telemedicine encounter.      Reason for Telemedicine Visit: Services only offered telehealth    Originating Site (Patient Location): Patient's home    Distant Site (Provider Location): [facility] MENTAL HEALTH & ADDICTION SERVICES    Consent:  The patient/guardian has verbally consented to: the potential risks and benefits of telemedicine (video visit) versus in person care; bill my insurance or make self-payment for services provided; and responsibility for payment of non-covered services.     Patient would like the video invitation sent by:  My Chart    Mode of Communication:  Video Conference via Medical Zoom    As the provider I attest to compliance with applicable laws and regulations related to telemedicine.         NUMBER OF PARTICIPANTS: 8    Summary of Group / Topics Discussed:  Coping Skills:Worry Management:  Patients learned about worry management techniques.  Reviewed the benefits of applying the aforementioned coping strategies.  Patients explored how these strategies might be applied to daily stressors or distressing situations.    Patient Session Goals / Objectives:    Understand the purpose and benefits of applying  coping strategies    [place] worry management skills    Address barriers to utilizing coping skills when in distress.        Patient Participation / Response:  Moderately participated, sharing some personal reflections / insights and adequately adequately received / provided feedback with other  participants.    Demonstrated understanding of topics discussed through group discussion and participation and Expressed understanding of the relevance / importance of coping skills at distressing times in life    Treatment Plan:  Patient has a current master individualized treatment plan.  See Epic treatment plan for more information.    Khadra Oquendo, LICSW

## 2021-10-27 NOTE — PROGRESS NOTES
Acknowledgement of Current Treatment Plan       I have reviewed my treatment plan with my therapist, YINKA Wang LICSW.   I agree with the plan as it is written in the electronic health record. (A-2 Track)      Name:      Signature:  Alexa Kline         Unable to sign   Dr Jostin Hook MD  Psychiatrist      Dr. Samira MD  Psychiatrist      Khadra Jarquin, [facility]  Psychotherapist        YINKA Wang LICSW   Psychotherapist   YINKA Wang [facility]

## 2021-10-27 NOTE — GROUP NOTE
Service Modality:  Video Visit     Telemedicine Visit: The patient's condition can be safely assessed and treated via synchronous audio and visual telemedicine encounter.       Reason for Telemedicine Visit: Services only offered telehealth and due to COVID-19.     Originating Site (Patient Location): Patient's home     Distant Site (Provider Location): Provider Remote Setting- Home Office     Consent:  The patient/guardian has verbally consented to: the potential risks and benefits of telemedicine (video visit) versus in person care; bill my insurance or make self-payment for services provided; and responsibility for payment of non-covered services.      Patient would like the video invitation sent by:  My Chart     Mode of Communication:  Video Conference via Medical Zoom     As the provider I attest to compliance with applicable laws and regulations related to telemedicine.    Process Group Note    PATIENT'S NAME: Alexa Kline  MRN:   [id number]  :   1960  ACCT. NUMBER: [id number]  DATE OF SERVICE: 10/27/21  START TIME: 10:00 AM  END TIME: 10:50 AM  FACILITATOR: Lindsey Mederos LICSW  TOPIC:  Process Group    Diagnoses:  300.02 (F41.1) Generalized Anxiety Disorder.  311 (F32.9) Unspecified Depressive Disorder .      [facility] 55+ Program  TRACK: A2    NUMBER OF PARTICIPANTS: 7          Data:    Session content: At the start of this group, patients were invited to check in by identifying themselves, describing their current emotional status, and identifying issues to address in this group.   Area(s) of treatment focus addressed in this session included Symptom Management, Personal Safety, Community Resources/Discharge Planning, Abstinence/Relapse Prevention, Develop / Improve Independent Living Skills and Develop Socialization / Interpersonal Relationship Skills.    Alexa reports depressed mood and anxious mood with worry and guilt. Denies S/I or safety issues. She processed her feelings  related to her daughter flying in from [place] to provide support to her. She felt disappointed with herself that her daughter came and was not able to eat like she normally does at her home in [place]. Alexa processed her feelings about navigating through the mental health system and feels she has fallen through the cracks with her providers. Writer spoke with patient by phone to discuss treatment plan and also orient her to program and role of provider visits. Writer offered reassurance. Pt has providers: therapist, psychiatrist and PCP through [facility]. We discussed what to ask from her psychiatrist on her November 2 meeting, re: lab work and Neuropsych Testing information. ISABELLA was signed which was one of her concerns.      Therapeutic Interventions/Treatment Strategies:  Psychotherapist offered support, feedback and validation and reinforced use of skills. Treatment modalities used include Cognitive Behavioral Therapy.    Assessment:    Patient response:   Patient responded to session by verbalizing understanding    Possible barriers to participation / learning include: and no barriers identified    Health Issues:   None reported       Substance Use Review:   Substance Use: No active concerns identified.    Mental Status/Behavioral Observations  Appearance:   Appropriate   Eye Contact:   Good   Psychomotor Behavior: Normal   Attitude:   Cooperative  Interested  Orientation:   All  Speech   Rate / Production: Normal    Volume:  Normal   Mood:    Anxious  Depressed   Affect:    Appropriate  Worrisome   Thought Content:   Safety denies any current safety concerns including suicidal ideation, self-harm, and homicidal ideation  Thought Form:  Coherent  Goal Directed  Logical     Insight:    Good     Plan:     Safety Plan: No current safety concerns identified.  Recommended that patient call 911 or go to the local ED should there be a change in any of these risk factors.     Barriers to treatment: None  identified    Patient Contracts (see media tab):  None    Substance Use: Not addressed in session     Continue or Discharge: Patient will continue in 55+ Program (55+) as planned. Patient is likely to benefit from learning and using skills as they work toward the goals identified in their treatment plan. Alexa will continue for mood stabilization and symptom management education for mental health management.    Lindsey Mederos, [facility]  October 27, 2021

## 2021-10-28 NOTE — GROUP NOTE
Psychotherapy Group Note    PATIENT'S NAME: Alexa Kline  MRN:   [id number]  :   1960  ACCT. NUMBER: [id number]  DATE OF SERVICE: 10/28/21  START TIME: 10:00 AM  END TIME: 10:50 AM  FACILITATOR: Khadra Jarquin LICSW  TOPIC: MH EBP Group: Coping Skills  [facility] 55+ Program  TRACK: A2                              Service Modality:  Video Visit     Telemedicine Visit: The patient's condition can be safely assessed and treated via synchronous audio and visual telemedicine encounter.      Reason for Telemedicine Visit: Services only offered telehealth    Originating Site (Patient Location): Patient's home    Distant Site (Provider Location): [facility]    Consent:  The patient/guardian has verbally consented to: the potential risks and benefits of telemedicine (video visit) versus in person care; bill my insurance or make self-payment for services provided; and responsibility for payment of non-covered services.     Patient would like the video invitation sent by:  My Chart    Mode of Communication:  Video Conference via Medical Zoom    As the provider I attest to compliance with applicable laws and regulations related to telemedicine.         NUMBER OF PARTICIPANTS: 8  Summary of Group / Topics Discussed:  Coping Skills:Worry Management Part 3:  Patients learned about worry management techniques.  Reviewed the benefits of applying the aforementioned coping strategies.  Patients explored how these strategies might be applied to daily stressors or distressing situations.     Patient Session Goals / Objectives:    Understand the purpose and benefits of applying  coping strategies    [place] worry management skills    Address barriers to utilizing coping skills when in distress.      Patient Participation / Response:  Fully participated with the group by sharing personal reflections / insights and openly received / provided feedback with other  participants.    Demonstrated understanding of topics discussed through group discussion and participation, Expressed understanding of the relevance / importance of coping skills at distressing times in life and Identified / Expressed personal readiness to practice new coping skills    Treatment Plan:  Patient has a current master individualized treatment plan.  See Epic treatment plan for more information.    ALLYSON EscuderoSW

## 2021-10-28 NOTE — GROUP NOTE
Service Modality:  Video Visit     Telemedicine Visit: The patient's condition can be safely assessed and treated via synchronous audio and visual telemedicine encounter.       Reason for Telemedicine Visit: Services only offered telehealth and due to COVID-19.     Originating Site (Patient Location): Patient's home     Distant Site (Provider Location): Provider Remote Setting- Home Office     Consent:  The patient/guardian has verbally consented to: the potential risks and benefits of telemedicine (video visit) versus in person care; bill my insurance or make self-payment for services provided; and responsibility for payment of non-covered services.      Patient would like the video invitation sent by:  My Chart     Mode of Communication:  Video Conference via Medical Zoom     As the provider I attest to compliance with applicable laws and regulations related to telemedicine.    Process Group Note    PATIENT'S NAME: Alexa Kline  MRN:   [id number]  :   1960  ACCT. NUMBER: [id number]  DATE OF SERVICE: 10/28/21  START TIME:  9:00 AM  END TIME:  9:50 AM  FACILITATOR: Lindsey Mederos LICSW  TOPIC:  Process Group    Diagnoses:  300.02 (F41.1) Generalized Anxiety Disorder.  311 (F32.9) Unspecified Depressive Disorder .      [facility] 55+ Program  TRACK: A2    NUMBER OF PARTICIPANTS: 8          Data:    Session content: At the start of this group, patients were invited to check in by identifying themselves, describing their current emotional status, and identifying issues to address in this group.   Area(s) of treatment focus addressed in this session included Symptom Management, Personal Safety, Community Resources/Discharge Planning, Abstinence/Relapse Prevention, Develop / Improve Independent Living Skills and Develop Socialization / Interpersonal Relationship Skills.    Alexa reports anxious mood, worry and paranoia. Denies S/I or safety issues. She processed her feelings of being overwhelmed  with coordinating with her psychiatrist. Her appointment is Tuesday, November 2. She was able to accept feedback from peers in group. She is going to get  a medication list from her pharmacy. She also was encouraged to enroll her  and sister, Magy into her mental health recovery.  She processed her acceptance level and trusting the process for healing so she can feel better. Denies concerns for her weekend. Writer offered reassurance.      Therapeutic Interventions/Treatment Strategies:  Psychotherapist offered support, feedback and validation and reinforced use of skills. Treatment modalities used include Cognitive Behavioral Therapy.    Assessment:    Patient response:   Patient responded to session by listening, focusing on goals, being attentive and verbalizing understanding    Possible barriers to participation / learning include: and no barriers identified    Health Issues:   None reported       Substance Use Review:   Substance Use: No active concerns identified.    Mental Status/Behavioral Observations  Appearance:   Appropriate   Eye Contact:   Good   Psychomotor Behavior: Normal   Attitude:   Cooperative  Interested Pleasant  Orientation:   All  Speech   Rate / Production: Normal    Volume:  Normal   Mood:    Anxious   Affect:    Appropriate  Worrisome   Thought Content:   Rumination, Psychosis reports paranoia and Safety denies any current safety concerns including suicidal ideation, self-harm, and homicidal ideation  Thought Form:  Coherent  Goal Directed  Logical     Insight:    Good     Plan:     Safety Plan: No current safety concerns identified.  Recommended that patient call 911 or go to the local ED should there be a change in any of these risk factors.     Barriers to treatment: None identified    Patient Contracts (see media tab):  None    Substance Use: Not addressed in session     Continue or Discharge: Patient will continue in 55+ Program (55+) as planned. Patient is likely to benefit  from learning and using skills as they work toward the goals identified in their treatment plan. Alexa will continue for mood stabilization and symptom management education for mental health recovery.      Lindsey Mederos, [facility]  October 28, 2021

## 2021-10-28 NOTE — GROUP NOTE
Service Modality:  Video Visit     Telemedicine Visit: The patient's condition can be safely assessed and treated via synchronous audio and visual telemedicine encounter.       Reason for Telemedicine Visit: Services only offered telehealth and due to COVID-19.     Originating Site (Patient Location): Patient's home     Distant Site (Provider Location): Provider Remote Setting- Home Office     Consent:  The patient/guardian has verbally consented to: the potential risks and benefits of telemedicine (video visit) versus in person care; bill my insurance or make self-payment for services provided; and responsibility for payment of non-covered services.      Patient would like the video invitation sent by:  My Chart     Mode of Communication:  Video Conference via Medical Zoom     As the provider I attest to compliance with applicable laws and regulations related to telemedicine.    Psychotherapy Group Note    PATIENT'S NAME: Alexa Kline  MRN:   [id number]  :   1960  ACCT. NUMBER: [id number]  DATE OF SERVICE: 10/28/21  START TIME: 11:00 AM  END TIME: 11:50 AM  FACILITATOR: Lindsey Mederos LICSW  TOPIC:  EBP Group: Relationship Skills  [facility] 55+ Program  TRACK: A2    NUMBER OF PARTICIPANTS: 8    Summary of Group / Topics Discussed:  Relationship Skills: Relationship Mapping: Patients identified different types of relationships they have in their life and examined if there is conflict or closeness, the degree of conflict or closeness, and the reason for conflict. The goal of this topic is to help patients gain awareness of the relationships they have with others, identify types of conflict in patients  lives and how they impact symptoms/functioning, and identify how they can improve relationships with relationship and interpersonal skills they have learned.     Patient Session Goals / Objectives:    Familiarized patients with awareness to the different types of relationships they may have  with different people  in their lives    Discussed and practiced strategies to promote healthier understanding of interpersonal relationships with a focus on awareness of conflict, the causes of conflict, and the ways to transform conflict    Discussed the roles of support in mental health recovery.      Patient Participation / Response:  Fully participated with the group by sharing personal reflections / insights and openly received / provided feedback with other participants.    Demonstrated understanding of topics discussed through group discussion and participation, Demonstrated understanding of relationship skills and communication skills and Practiced skills in session    Treatment Plan:  Patient has a current master individualized treatment plan.  See Epic treatment plan for more information.    Lindsey Mederos, [facility]

## 2021-11-01 NOTE — GROUP NOTE
Service Modality:  Video Visit     Telemedicine Visit: The patient's condition can be safely assessed and treated via synchronous audio and visual telemedicine encounter.       Reason for Telemedicine Visit: Services only offered telehealth and due to COVID-19.     Originating Site (Patient Location): Patient's home     Distant Site (Provider Location): Provider Remote Setting- Home Office     Consent:  The patient/guardian has verbally consented to: the potential risks and benefits of telemedicine (video visit) versus in person care; bill my insurance or make self-payment for services provided; and responsibility for payment of non-covered services.      Patient would like the video invitation sent by:  My Chart     Mode of Communication:  Video Conference via Medical Zoom     As the provider I attest to compliance with applicable laws and regulations related to telemedicine.    Psychotherapy Group Note    PATIENT'S NAME: Alexa Kline  MRN:   [id number]  :   1960  ACCT. NUMBER: [id number]  DATE OF SERVICE: 21  START TIME: 11:00 AM  END TIME: 11:50 AM  FACILITATOR: Lindsey Mederos [facility]  TOPIC:  EBP Group: Cognitive Restructuring  [facility] 55+ Program  TRACK: A2    NUMBER OF PARTICIPANTS: 5    Summary of Group / Topics Discussed:  Cognitive Restructuring: Core Beliefs: Patients received an overview of what a core belief is, and how they develop. Patients then began to identify their negative core beliefs. Patients worked to modify core beliefs with the goal of improved self-image and functioning.     Patient Session Goals / Objectives:    Familiarize self with the concept of core beliefs and how they develop.      Explore personal core beliefs (positive and negative)    Develop / advance recognition of the connection between negative thoughts and negative core beliefs.    Formulate new neutral/positive core beliefs               Patient Participation / Response:  Fully participated  with the group by sharing personal reflections / insights and openly received / provided feedback with other participants.    Demonstrated understanding of topics discussed through group discussion and participation, Expressed understanding of the relationship between behaviors, thoughts, and feelings and Practiced skills in session    Treatment Plan:  Patient has a current master individualized treatment plan.  See Epic treatment plan for more information.    Lindsey Mederos, LICSW

## 2021-11-01 NOTE — GROUP NOTE
Service Modality:  Video Visit     Telemedicine Visit: The patient's condition can be safely assessed and treated via synchronous audio and visual telemedicine encounter.       Reason for Telemedicine Visit: Services only offered telehealth and due to COVID-19.     Originating Site (Patient Location): Patient's home     Distant Site (Provider Location): Provider Remote Setting- Home Office     Consent:  The patient/guardian has verbally consented to: the potential risks and benefits of telemedicine (video visit) versus in person care; bill my insurance or make self-payment for services provided; and responsibility for payment of non-covered services.      Patient would like the video invitation sent by:  My Chart     Mode of Communication:  Video Conference via Medical Zoom     As the provider I attest to compliance with applicable laws and regulations related to telemedicine.    Process Group Note    PATIENT'S NAME: Alexa Kline  MRN:   [id number]  :   1960  ACCT. NUMBER: [id number]  DATE OF SERVICE: 21  START TIME:  9:00 AM  END TIME:  9:50 AM  FACILITATOR: Lindsey Mederos LICSW  TOPIC:  Process Group    Diagnoses:  300.02 (F41.1) Generalized Anxiety Disorder.  311 (F32.9) Unspecified Depressive Disorder .      [facility] 55+ Program  TRACK: A2    NUMBER OF PARTICIPANTS: 4          Data:    Session content: At the start of this group, patients were invited to check in by identifying themselves, describing their current emotional status, and identifying issues to address in this group.   Area(s) of treatment focus addressed in this session included Symptom Management, Personal Safety, Community Resources/Discharge Planning, Abstinence/Relapse Prevention, Develop / Improve Independent Living Skills and Develop Socialization / Interpersonal Relationship Skills.    Alexa reports depressed mood and anxious mood. She reports having suicidal ideation over the weekend. She used her coping  plan for safety which including calling [facility], a friend in [place] and her . She used coping skills for symptom management. Denies S/I or safety issues currently. She is going to consult with her outpatient psychiatrist tomorrow. She reports medication compliance.  She was able to complete a defensive driving course over the weekend so her car insurance rates will be lower.      Therapeutic Interventions/Treatment Strategies:  Psychotherapist offered support, feedback and validation and reinforced use of skills. Treatment modalities used include Cognitive Behavioral Therapy.    Assessment:    Patient response:   Patient responded to session by verbalizing understanding    Possible barriers to participation / learning include: and no barriers identified    Health Issues:   None reported       Substance Use Review:   Substance Use: No active concerns identified.    Mental Status/Behavioral Observations  Appearance:   Appropriate   Eye Contact:   Good   Psychomotor Behavior: Normal   Attitude:   Cooperative  Interested  Orientation:   All  Speech   Rate / Production: Normal    Volume:  Normal   Mood:    Anxious  Depressed   Affect:    Appropriate  Worrisome   Thought Content:   Clear and Safety denies any current safety concerns including suicidal ideation, self-harm, and homicidal ideation and Safety concerns have decreased  Thought Form:  Coherent  Goal Directed  Logical     Insight:    Good     Plan:     Safety Plan: No current safety concerns identified.  Recommended that patient call 911 or go to the local ED should there be a change in any of these risk factors.     Barriers to treatment: None identified    Patient Contracts (see media tab):  None    Substance Use: Not addressed in session     Continue or Discharge: Patient will continue in 55+ Program (55+) as planned. Patient is likely to benefit from learning and using skills as they work toward the goals identified in their treatment  plan. Alexa will continue for mood stabilization and symptom management education for mental health recovery.       Lindsey Mederos, [facility]  November 1, 2021

## 2021-11-01 NOTE — GROUP NOTE
Psychoeducation Group Note    PATIENT'S NAME: Alexa Kline  MRN:   [id number]  :   1960  ACCT. NUMBER: [id number]  DATE OF SERVICE: 21  START TIME: 10:00 AM  END TIME: 10:50 AM  FACILITATOR: Oksana Guardado LPCC  TOPIC: AMNA RN Group: Health Maintenance  [facility] 55+ Program  TRACK: A-2    NUMBER OF PARTICIPANTS: 5    Summary of Group / Topics Discussed:  Health Maintenance: Goal Setting: Meaningful goals can bring a sense of direction and purpose in life.  They also highlight our most important values. Patients were assisted by instructor to identify short term goals to promote their mental health recovery and improve overall health and wellness. Patients were educated on SMART goal setting framework as a strategy to increase outcomes and promote success.    Patient Session Goals / Objectives:  ? Explained the key concepts of SMART goal setting framework  ? Identified three goals successfully using SMART goal setting framework  ? Reviewed concept of balance in wellness as it pertains to goal setting                                        Service Modality:  Video Visit     Telemedicine Visit: The patient's condition can be safely assessed and treated via synchronous audio and visual telemedicine encounter.      Reason for Telemedicine Visit: Services only offered telehealth    Originating Site (Patient Location): Patient's home    Distant Site (Provider Location): Provider Remote Setting- Home Office    Consent:  The patient/guardian has verbally consented to: the potential risks and benefits of telemedicine (video visit) versus in person care; bill my insurance or make self-payment for services provided; and responsibility for payment of non-covered services.     Patient would like the video invitation sent by:  My Chart    Mode of Communication:  Video Conference via Medical Zoom    As the provider I attest to compliance with applicable laws and regulations related to telemedicine.             Patient Participation / Response:  Fully participated with the group by sharing personal reflections / insights and openly received / provided feedback with other participants.    Demonstrated understanding of topics discussed through group discussion and participation and Identified / Expressed personal readiness to practice skills    Treatment Plan:  Patient has a current master individualized treatment plan.  See Epic treatment plan for more information.    Oksana Guardado, LPCC

## 2021-11-03 NOTE — GROUP NOTE
Psychotherapy Group Note    PATIENT'S NAME: Alexa Kline  MRN:   [id number]  :   1960  ACCT. NUMBER: [id number]  DATE OF SERVICE: 21  START TIME: 11:00 AM  END TIME: 11:50 AM  FACILITATOR: Khadra Jarquin LICSW  TOPIC: MH EBP Group: Coping Skills  [facility] 55+ Program  TRACK: A2                              Service Modality:  Video Visit     Telemedicine Visit: The patient's condition can be safely assessed and treated via synchronous audio and visual telemedicine encounter.      Reason for Telemedicine Visit: Services only offered telehealth    Originating Site (Patient Location): Patient's home    Distant Site (Provider Location): [facility] & ADDI[facility]    Consent:  The patient/guardian has verbally consented to: the potential risks and benefits of telemedicine (video visit) versus in person care; bill my insurance or make self-payment for services provided; and responsibility for payment of non-covered services.     Patient would like the video invitation sent by:  My Chart    Mode of Communication:  Video Conference via Medical Zoom    As the provider I attest to compliance with applicable laws and regulations related to telemedicine.         NUMBER OF PARTICIPANTS: 7  Summary of Group / Topics Discussed:  Foundations of Health: Sleep part 2: Discussed sleep issues and connection with mental illness. Discussed sleep hygiene techniques and ways to improve sleep.     Patient Session Goals / Objectives:  1. Described the effect and purpose of sleep on the brain and identify the amount of sleep needed  2. Identify sleep hygiene techniques  3. Described the connection between sleep disturbances and mental illness  ? Increased knowledge about treatments for sleep disorders         Patient Participation / Response:  Fully participated with the group by sharing personal reflections / insights and openly received / provided feedback with other  participants.    Demonstrated understanding of topics discussed through group discussion and participation, Expressed understanding of the relevance / importance of coping skills at distressing times in life and Identified / Expressed personal readiness to practice new coping skills    Treatment Plan:  Patient has a current master individualized treatment plan.  See Epic treatment plan for more information.    ALLYSON EscuderoSW

## 2021-11-03 NOTE — GROUP NOTE
Service Modality:  Video Visit     Telemedicine Visit: The patient's condition can be safely assessed and treated via synchronous audio and visual telemedicine encounter.       Reason for Telemedicine Visit: Services only offered telehealth and due to COVID-19.     Originating Site (Patient Location): Patient's home     Distant Site (Provider Location): Provider Remote Setting- Home Office     Consent:  The patient/guardian has verbally consented to: the potential risks and benefits of telemedicine (video visit) versus in person care; bill my insurance or make self-payment for services provided; and responsibility for payment of non-covered services.      Patient would like the video invitation sent by:  My Chart     Mode of Communication:  Video Conference via Medical Zoom     As the provider I attest to compliance with applicable laws and regulations related to telemedicine.    Process Group Note    PATIENT'S NAME: Alexa Kline  MRN:   [id number]  :   1960  ACCT. NUMBER: [id number]  DATE OF SERVICE: 21  START TIME: 10:00 AM  END TIME: 10:50 AM  FACILITATOR: Lindsey Mederos LICSW  TOPIC:  Process Group    Diagnoses:  300.02 (F41.1) Generalized Anxiety Disorder.  311 (F32.9) Unspecified Depressive Disorder .      [facility] 55+ Program  TRACK: A2    NUMBER OF PARTICIPANTS: 7          Data:    Session content: At the start of this group, patients were invited to check in by identifying themselves, describing their current emotional status, and identifying issues to address in this group.   Area(s) of treatment focus addressed in this session included Symptom Management, Personal Safety, Community Resources/Discharge Planning, Abstinence/Relapse Prevention, Develop / Improve Independent Living Skills and Develop Socialization / Interpersonal Relationship Skills.    Alexa reports depressed and anxious mood. Denies S/I or safety issues. She reports fatigue. She processed her functioning  level with mood symptoms and is feeling overwhelmed with trying to remember tasks and medication management. She is concerned that she will not know when a refill is needed. She was encouraged to explore online pharmacy options with [facility] for refills and for medication information. She did consult with her psychiatrist yesterday who is reducing Lexapro from 10 mg to 5 mg then she will discontinue. She has a follow up appointment with her PCP mid-November for lab work. She has an initial appointment with an therapist on 12/6. Her  is supportive. She is looking forward to going for a walk.      Therapeutic Interventions/Treatment Strategies:  Psychotherapist offered support, feedback and validation and reinforced use of skills. Treatment modalities used include Cognitive Behavioral Therapy.    Assessment:    Patient response:   Patient responded to session by verbalizing understanding    Possible barriers to participation / learning include: and no barriers identified    Health Issues:   None reported       Substance Use Review:   Substance Use: No active concerns identified.    Mental Status/Behavioral Observations  Appearance:   Appropriate   Eye Contact:   Good   Psychomotor Behavior: Normal   Attitude:   Cooperative  Interested Pleasant  Orientation:   All  Speech   Rate / Production: Normal    Volume:  Normal   Mood:    Anxious  Depressed   Affect:    Appropriate  Worrisome   Thought Content:   Clear and Safety denies any current safety concerns including suicidal ideation, self-harm, and homicidal ideation  Thought Form:  Coherent  Goal Directed  Logical     Insight:    Good     Plan:     Safety Plan: No current safety concerns identified.  Recommended that patient call 911 or go to the local ED should there be a change in any of these risk factors.     Barriers to treatment: None identified    Patient Contracts (see media tab):  None    Substance Use: Not addressed in session     Continue or Discharge:  Patient will continue in 55+ Program (55+) as planned. Patient is likely to benefit from learning and using skills as they work toward the goals identified in their treatment plan. Alexa will continue for mood stabilization and symptom management education for mental health recovery.       Lindsey Mederos, [facility]  November 3, 2021

## 2021-11-03 NOTE — GROUP NOTE
Psychotherapy Group Note    PATIENT'S NAME: Alexa Kline  MRN:   [id number]  :   1960  ACCT. NUMBER: [id number]  DATE OF SERVICE: 21  START TIME:  9:00 AM  END TIME:  9:50 AM  FACILITATOR: Khadra Jarquin LICSW  TOPIC: MH EBP Group: Coping Skills  [facility] 55+ Program  TRACK: A2                              Service Modality:  Video Visit     Telemedicine Visit: The patient's condition can be safely assessed and treated via synchronous audio and visual telemedicine encounter.      Reason for Telemedicine Visit: Services only offered telehealth    Originating Site (Patient Location): Patient's home    Distant Site (Provider Location): [facility] MENTAL HEALTH & ADDICTION SERVICES    Consent:  The patient/guardian has verbally consented to: the potential risks and benefits of telemedicine (video visit) versus in person care; bill my insurance or make self-payment for services provided; and responsibility for payment of non-covered services.     Patient would like the video invitation sent by:  My Chart    Mode of Communication:  Video Conference via Medical Zoom    As the provider I attest to compliance with applicable laws and regulations related to telemedicine.         NUMBER OF PARTICIPANTS: 7    Summary of Group / Topics Discussed:  Coping Skills: Additional Coping Skills:  Patients learned about stress management skills.  Reviewed the benefits of applying the aforementioned coping strategies.  Patients explored how these strategies might be applied to daily stressors or distressing situations.    Patient Session Goals / Objectives:    Understand the purpose and benefits of applying stress management coping strategies    Identified those skills that may be helpful    Address barriers to utilizing coping skills when in distress.        Patient Participation / Response:  Fully participated with the group by sharing personal reflections / insights and openly received / provided  feedback with other participants.    Demonstrated understanding of topics discussed through group discussion and participation, Expressed understanding of the relevance / importance of coping skills at distressing times in life and Identified / Expressed personal readiness to practice new coping skills    Treatment Plan:  Patient has a current master individualized treatment plan.  See Epic treatment plan for more information.    Khadra Oquendo, LICSW

## 2021-11-04 NOTE — GROUP NOTE
Service Modality:  Video Visit     Telemedicine Visit: The patient's condition can be safely assessed and treated via synchronous audio and visual telemedicine encounter.       Reason for Telemedicine Visit: Services only offered telehealth and due to COVID-19.     Originating Site (Patient Location): Patient's home     Distant Site (Provider Location): Provider Remote Setting- Home Office     Consent:  The patient/guardian has verbally consented to: the potential risks and benefits of telemedicine (video visit) versus in person care; bill my insurance or make self-payment for services provided; and responsibility for payment of non-covered services.      Patient would like the video invitation sent by:  My Chart     Mode of Communication:  Video Conference via Medical Zoom     As the provider I attest to compliance with applicable laws and regulations related to telemedicine.    Psychotherapy Group Note    PATIENT'S NAME: Alexa Kline  MRN:   [id number]  :   1960  ACCT. NUMBER: [id number]  DATE OF SERVICE: 21  START TIME: 11:00 AM  END TIME: 11:50 AM  FACILITATOR: Lindsey Mederos LICSW  TOPIC:  EBP Group: Specialty Awareness  [facility] 55+ Program  TRACK: A2    NUMBER OF PARTICIPANTS: 8    Summary of Group / Topics Discussed:  Specialty Topics: Hope: The topic of hope was presented in order to help patients better understand the symptoms of hopelessness and how to become more hopeful. Patients discussed their current awareness of the topic and relevance to their functioning. Individual experiences with symptoms and treatment options were also discussed. Patients explored options for ongoing/future treatment and symptom management.      Patient Session Goals / Objectives:    Discussed definition of hopelessness    Discussed how hopelessness impacts functioning    Set a plan to utilize skills to reduce hopelessness        Patient Participation / Response:  Fully participated with the  group by sharing personal reflections / insights and openly received / provided feedback with other participants.    Demonstrated understanding of topics discussed through group discussion and participation, Verbalized understanding of ways to proactively manage illness and Practiced skills in session    Treatment Plan:  Patient has a current master individualized treatment plan.  See Epic treatment plan for more information.    Lindsey Mederos, LICSW

## 2021-11-04 NOTE — GROUP NOTE
Service Modality:  Video Visit     Telemedicine Visit: The patient's condition can be safely assessed and treated via synchronous audio and visual telemedicine encounter.      Reason for Telemedicine Visit: Services only offered telehealth    Originating Site (Patient Location): Patient's home    Distant Site (Provider Location): Provider Remote Setting- Home Office    Consent:  The patient/guardian has verbally consented to: the potential risks and benefits of telemedicine (video visit) versus in person care; bill my insurance or make self-payment for services provided; and responsibility for payment of non-covered services.     Patient would like the video invitation sent by:  My Chart    Mode of Communication:  Video Conference via Medical Zoom    As the provider I attest to compliance with applicable laws and regulations related to telemedicine.      Psychotherapy Group Note    PATIENT'S NAME: Alexa Kline  MRN:   [id number]  :   1960  ACCT. NUMBER: [id number]  DATE OF SERVICE: 21  START TIME: 10:00 AM  END TIME: 10:50 AM  FACILITATOR: Vianca Butts LMFT  TOPIC:  EBP Group: Coping Skills  [facility] 55+ Program  TRACK: A2    NUMBER OF PARTICIPANTS: 7    Summary of Group / Topics Discussed:  Coping Skills: Relapse Planning: Patients discussed and increased understanding of how anticipating and planning for possible increased symptoms is a proactive way to reduce the likelihood of relapse.  Patients shared individualized factors that may lead to increased symptoms and decompensation in functioning.  Each patient developed a relapse prevention plan designed to help them recognize when they may have increasing symptoms requiring them to take action and notify their key supports and care team.      Patient Session Goals / Objectives:    Identify and understand what factors may contribute to symptom relapse.    Identify actions that may be taken to manage  increased symptoms/stressors.    Create an individualized written relapse plan    Problem solve barriers to plan creation and implementation    Share relapse plan with key support people        Patient Participation / Response:  Fully participated with the group by sharing personal reflections / insights and openly received / provided feedback with other participants.    Demonstrated understanding of topics discussed through group discussion and participation, Expressed understanding of the relevance / importance of coping skills at distressing times in life and Identified / Expressed personal readiness to practice new coping skills    Treatment Plan:  Patient has a current master individualized treatment plan.  See Epic treatment plan for more information.    ABILIO Guzman

## 2021-11-04 NOTE — GROUP NOTE
Service Modality:  Video Visit     Telemedicine Visit: The patient's condition can be safely assessed and treated via synchronous audio and visual telemedicine encounter.       Reason for Telemedicine Visit: Services only offered telehealth and due to COVID-19.     Originating Site (Patient Location): Patient's home     Distant Site (Provider Location): Provider Remote Setting- Home Office     Consent:  The patient/guardian has verbally consented to: the potential risks and benefits of telemedicine (video visit) versus in person care; bill my insurance or make self-payment for services provided; and responsibility for payment of non-covered services.      Patient would like the video invitation sent by:  My Chart     Mode of Communication:  Video Conference via Medical Zoom     As the provider I attest to compliance with applicable laws and regulations related to telemedicine.    Process Group Note    PATIENT'S NAME: Alexa Kline  MRN:   [id number]  :   1960  ACCT. NUMBER: [id number]  DATE OF SERVICE: 21  START TIME:  9:00 AM  END TIME:  9:50 AM  FACILITATOR: Lindsey Mederos LICSW  TOPIC:  Process Group    Diagnoses:  300.02 (F41.1) Generalized Anxiety Disorder.  311 (F32.9) Unspecified Depressive Disorder .      [facility] 55+ Program  TRACK: A2    NUMBER OF PARTICIPANTS: 8          Data:    Session content: At the start of this group, patients were invited to check in by identifying themselves, describing their current emotional status, and identifying issues to address in this group.   Area(s) of treatment focus addressed in this session included Symptom Management, Personal Safety, Community Resources/Discharge Planning, Abstinence/Relapse Prevention, Develop / Improve Independent Living Skills and Develop Socialization / Interpersonal Relationship Skills.    Alexa reports depressed and anxious mood. Denies S/I or safety issues. She reports feeling overwhelmed at times. She does  communicate and discuss openly with her family about her pattern of mood symptoms. Alexa did talk with her daughter who resides in [place]. She was encouraged to engage in mood/symptom tracking. Alexa is using coping skills for symptom management including trying to maintain optimism and positive mind set with positive affirmations.      Therapeutic Interventions/Treatment Strategies:  Psychotherapist offered support, feedback and validation and reinforced use of skills. Treatment modalities used include Cognitive Behavioral Therapy.    Assessment:    Patient response:   Patient responded to session by verbalizing understanding    Possible barriers to participation / learning include: and no barriers identified    Health Issues:   Yes: Feels lethargic from medications       Substance Use Review:   Substance Use: No active concerns identified.    Mental Status/Behavioral Observations  Appearance:   Appropriate   Eye Contact:   Good   Psychomotor Behavior: Normal   Attitude:   Cooperative  Interested Pleasant  Orientation:   All  Speech   Rate / Production: Normal    Volume:  Normal   Mood:    Anxious  Depressed   Affect:    Appropriate  Worrisome   Thought Content:   Rumination and Safety denies any current safety concerns including suicidal ideation, self-harm, and homicidal ideation  Thought Form:  Coherent  Goal Directed  Logical     Insight:    Good     Plan:     Safety Plan: No current safety concerns identified.  Recommended that patient call 911 or go to the local ED should there be a change in any of these risk factors.     Barriers to treatment: None identified    Patient Contracts (see media tab):  None    Substance Use: Not addressed in session     Continue or Discharge: Patient will continue in 55+ Program (55+) as planned. Patient is likely to benefit from learning and using skills as they work toward the goals identified in their treatment plan. Alexa will continue for mood stabilization and symptom  management education for mental health recovery.      Lindsey Mederos, [facility]  November 4, 2021

## 2021-11-08 NOTE — GROUP NOTE
Service Modality:  Video Visit     Telemedicine Visit: The patient's condition can be safely assessed and treated via synchronous audio and visual telemedicine encounter.       Reason for Telemedicine Visit: Services only offered telehealth and due to COVID-19.     Originating Site (Patient Location): Patient's home     Distant Site (Provider Location): Provider Remote Setting- Home Office     Consent:  The patient/guardian has verbally consented to: the potential risks and benefits of telemedicine (video visit) versus in person care; bill my insurance or make self-payment for services provided; and responsibility for payment of non-covered services.      Patient would like the video invitation sent by:  My Chart     Mode of Communication:  Video Conference via Medical Zoom     As the provider I attest to compliance with applicable laws and regulations related to telemedicine.    Process Group Note    PATIENT'S NAME: Alexa Kline  MRN:   [id number]  :   1960  ACCT. NUMBER: [id number]  DATE OF SERVICE: 21  START TIME:  9:00 AM  END TIME:  9:50 AM  FACILITATOR: Lindsey Mederos LICSW  TOPIC:  Process Group    Diagnoses:  300.02 (F41.1) Generalized Anxiety Disorder.   311 (F32.9) Unspecified Depressive Disorder .      [facility] 55+ Program  TRACK: A2    NUMBER OF PARTICIPANTS: 7          Data:    Session content: At the start of this group, patients were invited to check in by identifying themselves, describing their current emotional status, and identifying issues to address in this group.   Area(s) of treatment focus addressed in this session included Symptom Management, Personal Safety, Community Resources/Discharge Planning, Abstinence/Relapse Prevention, Develop / Improve Independent Living Skills and Develop Socialization / Interpersonal Relationship Skills.    Alexa reports depressed and anxious mood with rumination, worry and paranoia. Denies S/I or safety issues. She was able to  accept the support from her  due to paranoid thinking. She believed that someone had stolen her identity. The psychosis was impacting her ability to sleep so she did take Klonopin.  She did have a good weekend overall.    Therapeutic Interventions/Treatment Strategies:  Psychotherapist offered support, feedback and validation and reinforced use of skills. Treatment modalities used include Cognitive Behavioral Therapy.    Assessment:    Patient response:   Patient responded to session by verbalizing understanding    Possible barriers to participation / learning include: and no barriers identified    Health Issues:   None reported       Substance Use Review:   Substance Use: No active concerns identified.    Mental Status/Behavioral Observations  Appearance:   Appropriate   Eye Contact:   Good   Psychomotor Behavior: Normal   Attitude:   Cooperative  Interested  Orientation:   All  Speech   Rate / Production: Normal    Volume:  Normal   Mood:    Anxious mood. Depressed mood.  Affect:    Appropriate  Worrisome   Thought Content:   Rumination, Psychosis reports paranoia and Safety denies any current safety concerns including suicidal ideation, self-harm, and homicidal ideation  Thought Form:  Coherent  Goal Directed  Logical     Insight:    Good     Plan:     Safety Plan: No current safety concerns identified.  Recommended that patient call 911 or go to the local ED should there be a change in any of these risk factors.     Barriers to treatment: None identified    Patient Contracts (see media tab):  None    Substance Use: Not addressed in session     Continue or Discharge: Patient will continue in 55+ Program (55+) as planned. Patient is likely to benefit from learning and using skills as they work toward the goals identified in their treatment plan. Alexa will continue for mood stabilization and symptom management education for mental health recovery.      Lindsey Mederos, [facility]  November 8, 2021

## 2021-11-08 NOTE — GROUP NOTE
Psychoeducation Group Note    PATIENT'S NAME: Alexa Kline  MRN:   [id number]  :   1960  ACCT. NUMBER: [id number]  DATE OF SERVICE: 21  START TIME: 10:00 AM  END TIME: 10:50 AM  FACILITATOR: Khadra Jarquin LICSW  TOPIC: AMNA RN Group: Health Maintenance  [facility] 55+ Program  TRACK: A2                              Service Modality:  Video Visit     Telemedicine Visit: The patient's condition can be safely assessed and treated via synchronous audio and visual telemedicine encounter.      Reason for Telemedicine Visit: Services only offered telehealth    Originating Site (Patient Location): Patient's home    Distant Site (Provider Location): [facility] MENTAL HEALTH & ADDICTION SERVICES    Consent:  The patient/guardian has verbally consented to: the potential risks and benefits of telemedicine (video visit) versus in person care; bill my insurance or make self-payment for services provided; and responsibility for payment of non-covered services.     Patient would like the video invitation sent by:  My Chart    Mode of Communication:  Video Conference via Medical Zoom    As the provider I attest to compliance with applicable laws and regulations related to telemedicine.         NUMBER OF PARTICIPANTS: 6    Summary of Group / Topics Discussed:  Health Maintenance: Discharge planning/Community resources: Patients worked on completing an instructor-facilitated discharge planning activity. Discharge planning begins for all patients after admission. Competent discharge planning promotes a successful transition and decreases the likelihood of mental health relapse. In this group, all dimensions of wellness were reviewed to assess for needs/discharge readiness. These dimensions included: physical, emotional, occupational/productivity, environmental, social, spiritual, intellectual, and financial. Patients worked on completing/updating their discharge planning and identifying their treatment  needs prior to time of discharge.     Patient Session Goals / Objectives:  ? Identified unmet treatment needs to accomplish before discharge  ? Completed all dimensions of the discharge planning packet  ? Participated in the planning process, make phone calls, set up appointments, got connected with community resources, followed up with treatment team as needed         Patient Participation / Response:  Fully participated with the group by sharing personal reflections / insights and openly received / provided feedback with other participants.    Demonstrated understanding of topics discussed through group discussion and participation and Identified / Expressed personal readiness to practice skills    Treatment Plan:  Patient has a current master individualized treatment plan.  See Epic treatment plan for more information.    Khadra Oquendo, LICSW

## 2021-11-08 NOTE — GROUP NOTE
Service Modality:  Video Visit     Telemedicine Visit: The patient's condition can be safely assessed and treated via synchronous audio and visual telemedicine encounter.       Reason for Telemedicine Visit: Services only offered telehealth and due to COVID-19.     Originating Site (Patient Location): Patient's home     Distant Site (Provider Location): Provider Remote Setting- Home Office     Consent:  The patient/guardian has verbally consented to: the potential risks and benefits of telemedicine (video visit) versus in person care; bill my insurance or make self-payment for services provided; and responsibility for payment of non-covered services.      Patient would like the video invitation sent by:  My Chart     Mode of Communication:  Video Conference via Medical Zoom     As the provider I attest to compliance with applicable laws and regulations related to telemedicine.    Psychoeducation Group Note    PATIENT'S NAME: Alexa Kline  MRN:   [id number]  :   1960  ACCT. NUMBER: [id number]  DATE OF SERVICE: 21  START TIME: 11:00 AM  END TIME: 11:50 AM  [facility]ACILITATOR: [facility]eros [facility]  TOPIC: MH EBP Group: Health Maintenance  [facility] 55+ Program  TRACK: A2    NUMBER OF PARTICIPANTS: 7    Summary of Group / Topics Discussed:  Health Maintenance: Goal Setting: Meaningful goals can bring a sense of direction and purpose in life.  They also highlight our most important values. Patients were assisted by instructor to identify short term goals to promote their mental health recovery and improve overall health and wellness. Patients were educated on SMART goal setting framework as a strategy to increase outcomes and promote success.    Patient Session Goals / Objectives:  ? Explained the key concepts of SMART goal setting framework  ? Identified three goals successfully using SMART goal setting framework  ? Reviewed concept of balance in wellness as it pertains to goal setting         Patient Participation / Response:  Fully participated with the group by sharing personal reflections / insights and openly received / provided feedback with other participants.    Demonstrated understanding of topics discussed through group discussion and participation and Verbalized understanding of health maintenance topic    Treatment Plan:  Patient has a current master individualized treatment plan.  See Epic treatment plan for more information.    Lindsey Mederos, LICSW       None

## 2021-11-10 NOTE — GROUP NOTE
Service Modality:  Video Visit     Telemedicine Visit: The patient's condition can be safely assessed and treated via synchronous audio and visual telemedicine encounter.       Reason for Telemedicine Visit: Services only offered telehealth and due to COVID-19.     Originating Site (Patient Location): Patient's home     Distant Site (Provider Location): Provider Remote Setting- Home Office     Consent:  The patient/guardian has verbally consented to: the potential risks and benefits of telemedicine (video visit) versus in person care; bill my insurance or make self-payment for services provided; and responsibility for payment of non-covered services.      Patient would like the video invitation sent by:  My Chart     Mode of Communication:  Video Conference via Medical Zoom     As the provider I attest to compliance with applicable laws and regulations related to telemedicine.    Process Group Note    PATIENT'S NAME: Alexa Kline  MRN:   [id number]  :   1960  ACCT. NUMBER: [id number]  DATE OF SERVICE: 11/10/21  START TIME: 10:00 AM  END TIME: 10:50 AM  FACILITATOR: Lindsey Mederos LICSW  TOPIC:  Process Group    Diagnoses:  300.02 (F41.1) Generalized Anxiety Disorder.  311 (F32.9) Unspecified Depressive Disorder .      [facility] 55+ Program  TRACK: A2    NUMBER OF PARTICIPANTS: 8          Data:    Session content: At the start of this group, patients were invited to check in by identifying themselves, describing their current emotional status, and identifying issues to address in this group.   Area(s) of treatment focus addressed in this session included Symptom Management, Personal Safety, Community Resources/Discharge Planning, Abstinence/Relapse Prevention, Develop / Improve Independent Living Skills and Develop Socialization / Interpersonal Relationship Skills.    Alexa reports depressed mood with low motivation and anxious mood with worry, rumination. Denies S/I or safety issues. She  processed her stress related to paying a [employer] bill and email concerns. She reports paranoia has stayed the same acuity. Alexa has a supportive . She is using coping skills for symptom management. Writer offered reassurance and support.      Therapeutic Interventions/Treatment Strategies:  Psychotherapist offered support, feedback and validation and reinforced use of skills. Treatment modalities used include Cognitive Behavioral Therapy.    Assessment:    Patient response:   Patient responded to session by verbalizing understanding    Possible barriers to participation / learning include: and no barriers identified    Health Issues:   None reported       Substance Use Review:   Substance Use: No active concerns identified.    Mental Status/Behavioral Observations  Appearance:   Appropriate   Eye Contact:   Good   Psychomotor Behavior: Normal   Attitude:   Cooperative  Interested Pleasant  Orientation:   All  Speech   Rate / Production: Normal    Volume:  Normal   Mood:    Anxious  Depressed  Sad  Agitated  Affect:    Appropriate  Worrisome   Thought Content:   Rumination, Safety denies any current safety concerns including suicidal ideation, self-harm, and homicidal ideation and Paranoia has stayed the same  Thought Form:  Coherent  Goal Directed  Logical     Insight:    Good     Plan:     Safety Plan: No current safety concerns identified.  Recommended that patient call 911 or go to the local ED should there be a change in any of these risk factors.     Barriers to treatment: None identified    Patient Contracts (see media tab):  None    Substance Use: Not addressed in session   Continue or Discharge: Patient will continue in 55+ Program (55+) as planned. Patient is likely to benefit from learning and using skills as they work toward the goals identified in their treatment plan. Alexa will continue for mood stabilization and symptom management education for mental health recovery.     Lindsey Mederos,  LICSW  November 10, 2021

## 2021-11-10 NOTE — GROUP NOTE
Psychoeducation Group Note    PATIENT'S NAME: Alexa Kline  MRN:   [id number]  :   1960  ACCT. NUMBER: [id number]  DATE OF SERVICE: 11/10/21  START TIME: 11:00 AM  END TIME: 11:50 AM  FACILITATOR: Khadra Jarquin LICSW  TOPIC: AMNA RN Group: [facility] 55+ Program  TRACK: A2                              Service Modality:  Video Visit     Telemedicine Visit: The patient's condition can be safely assessed and treated via synchronous audio and visual telemedicine encounter.      Reason for Telemedicine Visit: Services only offered telehealth    Originating Site (Patient Location): Patient's home    Distant Site (Provider Location): [facility] MENTAL HEALTH & ADDICTION SERVICES    Consent:  The patient/guardian has verbally consented to: the potential risks and benefits of telemedicine (video visit) versus in person care; bill my insurance or make self-payment for services provided; and responsibility for payment of non-covered services.     Patient would like the video invitation sent by:  My Chart    Mode of Communication:  Video Conference via Medical Zoom    As the provider I attest to compliance with applicable laws and regulations related to telemedicine.         NUMBER OF PARTICIPANTS: 7    Summary of Group / Topics Discussed:  Foundations of Health: Exercise: Why exercise: Patients evaluated and discussed their current exercise behaviors/physical activity routine and identified barriers/obstacles to meeting daily physical activity recommendations. Patients were educated on the four types of exercise: endurance, strength, balance, and flexibility. Patients were educated on the benefits of getting daily physical activity, safety tips for beginning an exercise program, and fitness goal setting strategies.     Patient Session Goals / Objectives:  ? Explained the emotional and physical benefits of exercise  ? Identified how exercise and activity affects bodily  function  ? Listed ways to incorporate exercise into daily routine      Patient Participation / Response:  Fully participated with the group by sharing personal reflections / insights and openly received / provided feedback with other participants.    Demonstrated understanding of topics discussed through group discussion and participation and Identified / Expressed personal readiness to practice skills    Treatment Plan:  Patient has a current master individualized treatment plan.  See Epic treatment plan for more information.    Khadra Oquendo, LICSW

## 2021-11-10 NOTE — GROUP NOTE
Psychoeducation Group Note    PATIENT'S NAME: Alexa Kline  MRN:   [id number]  :   1960  ACCT. NUMBER: [id number]  DATE OF SERVICE: 11/10/21  START TIME:  9:00 AM  END TIME:  9:50 AM  FACILITATOR: Khadra Jarquin LICSW  TOPIC: AMNA RN Group: Health Maintenance  [facility] 55+ Program  TRACK: A2                              Service Modality:  Video Visit     Telemedicine Visit: The patient's condition can be safely assessed and treated via synchronous audio and visual telemedicine encounter.      Reason for Telemedicine Visit: Services only offered telehealth    Originating Site (Patient Location): Patient's home    Distant Site (Provider Location): [facility] MENTAL HEALTH & ADDICTION SERVICES    Consent:  The patient/guardian has verbally consented to: the potential risks and benefits of telemedicine (video visit) versus in person care; bill my insurance or make self-payment for services provided; and responsibility for payment of non-covered services.     Patient would like the video invitation sent by:  My Chart    Mode of Communication:  Video Conference via Medical Zoom    As the provider I attest to compliance with applicable laws and regulations related to telemedicine.         NUMBER OF PARTICIPANTS: 8    Summary of Group / Topics Discussed:  Health Maintenance: Discharge planning/Community resources part 2: Patients worked on completing an instructor-facilitated discharge planning activity. Discharge planning begins for all patients after admission. Competent discharge planning promotes a successful transition and decreases the likelihood of mental health relapse. In this group, all dimensions of wellness were reviewed to assess for needs/discharge readiness. These dimensions included: physical, emotional, occupational/productivity, environmental, social, spiritual, intellectual, and financial. Patients worked on completing/updating their discharge planning and identifying their  treatment needs prior to time of discharge.     Patient Session Goals / Objectives:  ? Identified unmet treatment needs to accomplish before discharge  ? Completed all dimensions of the discharge planning packet  ? Participated in the planning process, make phone calls, set up appointments, got connected with community resources, followed up with treatment team as needed         Patient Participation / Response:  Fully participated with the group by sharing personal reflections / insights and openly received / provided feedback with other participants.    Demonstrated understanding of topics discussed through group discussion and participation and Identified / Expressed personal readiness to practice skills    Treatment Plan:  Patient has a current master individualized treatment plan.  See Epic treatment plan for more information.    Khadra Oquendo, LICSW

## 2021-11-11 NOTE — PROGRESS NOTES
Psychiatry Provider Staffing Note    Late entry -- case discussed 11/10    Met with treatment team to discuss case, progress.    Alexa Kline is a 61 year old female enrolled in 55+ Intensive Outpatient track A2.      Medications:   Current Outpatient Medications   Medication     acetaminophen (TYLENOL) 500 MG tablet     albuterol (PROAIR HFA/PROVENTIL HFA/VENTOLIN HFA) 108 (90 Base) MCG/ACT inhaler     ALBUTEROL IN     ammonium lactate (AMLACTIN) 12 % external cream     ASMANEX, 30 METERED DOSES, 220 MCG/INH inhaler     azaTHIOprine (IMURAN) 50 MG tablet     biotin 1000 MCG TABS tablet     calcium 600-200 MG-UNIT TABS     cetirizine (ZYRTEC) 10 MG tablet     clonazePAM (KLONOPIN) 1 MG tablet     cycloSPORINE (RESTASIS) 0.05 % ophthalmic emulsion     divalproex sodium delayed-release (DEPAKOTE) 250 MG DR tablet     escitalopram (LEXAPRO) 10 MG tablet     famotidine (PEPCID) 20 MG tablet     linaclotide (LINZESS) 145 MCG capsule     lithium (ESKALITH) 300 MG tablet     Melatonin 10 MG TABS tablet     Melatonin-Pyridoxine (MELATONEX PO)     mometasone (NASONEX) 50 MCG/ACT nasal spray     Multiple Vitamins-Minerals (MULTIVITAMIN ADULT PO)     OLANZapine (ZYPREXA) 10 MG tablet     Omega-3 Fatty Acids (FISH OIL) 1200 MG capsule     traZODone (DESYREL) 50 MG tablet     Vitamin D, Cholecalciferol, 25 MCG (1000 UT) TABS     No current facility-administered medications for this encounter.         Diagnoses reviewed and include:  Patient Active Problem List   Diagnosis     Abdominal swelling     Acute respiratory failure (H)     Below normal amount of sodium in the blood     Blurring of visual image     Bronchiectasis (H)     Closed nondisplaced fracture of navicular bone of left foot with routine healing     Decreased potassium in the blood     Dental infection     Dermatochalasis     History of uterine cancer     Hyperglycemia     Lymphedema     Malignant melanoma of left lower extremity including hip (H)     Malignant  neoplasm of endometrium (H)     Non-allergic rhinitis     Osteopenia     Prolonged QT interval     Scar condition and fibrosis of skin     Seizure (H)     Swelling of foot joint, left     Visual disturbance     Vitreous floater, right     Orbital lesion     Optic perineuritis     High risk medication use     HLA B27 positive     Generalized anxiety disorder       Pertinent/updates:    generalized anxiety disorder     Unspecified depressive disorder    Care team notes and discussion:    Some suggestions of paranoid ideation    No acute safety concerns      Patient continues to meet criteria for program.  Continue plan of care.    Saul Hogan MD on 11/11/2021 at 10:10 AM

## 2021-11-11 NOTE — GROUP NOTE
Service Modality:  Video Visit     Telemedicine Visit: The patient's condition can be safely assessed and treated via synchronous audio and visual telemedicine encounter.       Reason for Telemedicine Visit: Services only offered telehealth and due to COVID-19.     Originating Site (Patient Location): Patient's home     Distant Site (Provider Location): Provider Remote Setting- Home Office     Consent:  The patient/guardian has verbally consented to: the potential risks and benefits of telemedicine (video visit) versus in person care; bill my insurance or make self-payment for services provided; and responsibility for payment of non-covered services.      Patient would like the video invitation sent by:  My Chart     Mode of Communication:  Video Conference via Medical Zoom     As the provider I attest to compliance with applicable laws and regulations related to telemedicine.    Process Group Note    PATIENT'S NAME: Alexa Kline  MRN:   [id number]  :   1960  ACCT. NUMBER: [id number]  DATE OF SERVICE: 21  START TIME:  9:00 AM  END TIME:  9:50 AM  FACILITATOR: Lindsey Mederos LICSW  TOPIC:  Process Group    Diagnoses:  300.02 (F41.1) Generalized Anxiety Disorder.  311 (F32.9) Unspecified Depressive Disorder .      [facility] 55+ Program  TRACK: A2    NUMBER OF PARTICIPANTS: 8          Data:    Session content: At the start of this group, patients were invited to check in by identifying themselves, describing their current emotional status, and identifying issues to address in this group.   Area(s) of treatment focus addressed in this session included Symptom Management, Personal Safety, Community Resources/Discharge Planning, Abstinence/Relapse Prevention, Develop / Improve Independent Living Skills and Develop Socialization / Interpersonal Relationship Skills.    Alexa reports depressed and anxious mood with worry. She continues to report paranoia. Denies S/I or safety issues. She was  able to accept feedback from peers to follow up with [employer] regarding her emails. She has not been getting them after the upgrade. Alexa processed the pattern of mood symptoms including worry, shakiness and avoidant behavior. She reports a change in her Depakote dosage today. Denies active concerns for her weekend. Writer offered reassurance.      Therapeutic Interventions/Treatment Strategies:  Psychotherapist offered support, feedback and validation and reinforced use of skills. Treatment modalities used include Cognitive Behavioral Therapy.    Assessment:    Patient response:   Patient responded to session by verbalizing understanding    Possible barriers to participation / learning include: and no barriers identified    Health Issues:   None reported       Substance Use Review:   Substance Use: No active concerns identified.    Mental Status/Behavioral Observations  Appearance:   Appropriate   Eye Contact:   Good   Psychomotor Behavior: Normal   Attitude:   Cooperative  Interested  Orientation:   All  Speech   Rate / Production: Normal    Volume:  Normal   Mood:    Anxious  Depressed   Affect:    Appropriate  Worrisome   Thought Content:   Rumination, Safety denies any current safety concerns including suicidal ideation, self-harm, and homicidal ideation and paranoia has stayed the same.  Thought Form:  Coherent  Goal Directed  Logical     Insight:    Good     Plan:     Safety Plan: No current safety concerns identified.  Recommended that patient call 911 or go to the local ED should there be a change in any of these risk factors.     Barriers to treatment: None identified    Patient Contracts (see media tab):  None    Substance Use: Not addressed in session     Continue or Discharge: Patient will continue in 55+ Program (55+) as planned. Patient is likely to benefit from learning and using skills as they work toward the goals identified in their treatment plan. Alexa will continue for mood stabilization and  symptom management education for mental health recovery.      Lindsey Mederos, [facility]  November 11, 2021

## 2021-11-11 NOTE — GROUP NOTE
Psychotherapy Group Note    PATIENT'S NAME: Alexa Kline  MRN:   [id number]  :   1960  ACCT. NUMBER: [id number]  DATE OF SERVICE: 21  START TIME: 10:00 AM  END TIME: 10:50 AM  FACILITATOR: Khadra Jarquin LICSW  TOPIC: MH EBP Group: Behavioral Activation  [facility] 55+ Program  TRACK: A2                              Service Modality:  Video Visit     Telemedicine Visit: The patient's condition can be safely assessed and treated via synchronous audio and visual telemedicine encounter.      Reason for Telemedicine Visit: Services only offered telehealth    Originating Site (Patient Location): Patient's home    Distant Site (Provider Location): [facility] MENTAL HEALTH & ADDICTION SERVICES    Consent:  The patient/guardian has verbally consented to: the potential risks and benefits of telemedicine (video visit) versus in person care; bill my insurance or make self-payment for services provided; and responsibility for payment of non-covered services.     Patient would like the video invitation sent by:  My Chart    Mode of Communication:  Video Conference via Medical Zoom    As the provider I attest to compliance with applicable laws and regulations related to telemedicine.         NUMBER OF PARTICIPANTS: 8    Summary of Group / Topics Discussed:  Behavioral Activation: Activity Scheduling/Routines:Patients explored how they currently spend their time, and how specific behaviors impact thoughts and feelings.  The group explored the effect of negative and positive activities on mood states and thought patterns.  Patients identified activities that help to improve mood and thinking patterns, and developed a plan to implement positive activities between sessions.      Patient Session Goals / Objectives:    Identify impact of current behaviors on thoughts and mood    Identify 2-3 behavioral changes that could have a positive impact on thoughts and mood    Prepare to make desired behavioral  change: Create a change plan / activity schedule      Patient Participation / Response:  Fully participated with the group by sharing personal reflections / insights and openly received / provided feedback with other participants.    Demonstrated understanding of topics discussed through group discussion and participation, Expressed understanding of the relationship between behaviors, thoughts, and feelings and Identified / Expressed personal readiness to make behavioral change    Treatment Plan:  Patient has a current master individualized treatment plan.  See Epic treatment plan for more information.    Khadra Oquendo, ALLYSONSW

## 2021-11-11 NOTE — GROUP NOTE
Service Modality:  Video Visit     Telemedicine Visit: The patient's condition can be safely assessed and treated via synchronous audio and visual telemedicine encounter.       Reason for Telemedicine Visit: Services only offered telehealth and due to COVID-19.     Originating Site (Patient Location): Patient's home     Distant Site (Provider Location): Provider Remote Setting- Home Office     Consent:  The patient/guardian has verbally consented to: the potential risks and benefits of telemedicine (video visit) versus in person care; bill my insurance or make self-payment for services provided; and responsibility for payment of non-covered services.      Patient would like the video invitation sent by:  My Chart     Mode of Communication:  Video Conference via Medical Zoom     As the provider I attest to compliance with applicable laws and regulations related to telemedicine.    Psychotherapy Group Note    PATIENT'S NAME: Alexa Kline  MRN:   [id number]  :   1960  ACCT. NUMBER: [id number]  DATE OF SERVICE: 21  START TIME: 11:00 AM  END TIME: 11:50 AM  FACILITATOR: Lindsey Mederos [facility]  TOPIC:  EBP Group: Mindfulness  [facility] 55+ Program  TRACK: A2    NUMBER OF PARTICIPANTS: 8    Summary of Group / Topics Discussed:  Mindfulness: What is Mindfulness: Patients received an overview on what mindfulness is and how mindfulness can benefit general health, mental health symptoms, and stressors. The history of mindfulness, its application to mental health therapies, and key concepts were also discussed. Patients discussed current awareness, knowledge, and practice of mindfulness skills. Patients also discussed barriers to mindfulness practice.     Patient Session Goals / Objectives:    Demonstrated understanding of key concepts and application to daily life    Identified when/how to use mindfulness     Resolved barriers to practice    Identified plan to use mindfulness in daily  life      Patient Participation / Response:  Fully participated with the group by sharing personal reflections / insights and openly received / provided feedback with other participants.    Demonstrated understanding of topics discussed through group discussion and participation and Demonstrated understanding of mindfulness skills and benefits of practice    Treatment Plan:  Patient has a current master individualized treatment plan.  See Epic treatment plan for more information.    Lindsey Mederos, LICSW

## 2021-11-15 NOTE — GROUP NOTE
Service Modality:  Video Visit     Telemedicine Visit: The patient's condition can be safely assessed and treated via synchronous audio and visual telemedicine encounter.       Reason for Telemedicine Visit: Services only offered telehealth and due to COVID-19.     Originating Site (Patient Location): Patient's home     Distant Site (Provider Location): Provider Remote Setting- Home Office     Consent:  The patient/guardian has verbally consented to: the potential risks and benefits of telemedicine (video visit) versus in person care; bill my insurance or make self-payment for services provided; and responsibility for payment of non-covered services.      Patient would like the video invitation sent by:  My Chart     Mode of Communication:  Video Conference via Medical Zoom     As the provider I attest to compliance with applicable laws and regulations related to telemedicine.    Psychoeducation Group Note    PATIENT'S NAME: Alexa Kline  MRN:   [id number]  :   1960  ACCT. NUMBER: [id number]  DATE OF SERVICE: 11/15/21  START TIME: 11:00 AM  END TIME: 11:50 AM  [facility]ILIT[facility]  TOPIC: MH EBP Group: Health Maintenance  [facility] 55+ Program  TRACK: A2    NUMBER OF PARTICIPANTS: 8    Summary of Group / Topics Discussed:  Health Maintenance: Goal Setting: Meaningful goals can bring a sense of direction and purpose in life.  They also highlight our most important values. Patients were assisted by instructor to identify short term goals to promote their mental health recovery and improve overall health and wellness. Patients were educated on SMART goal setting framework as a strategy to increase outcomes and promote success.    Patient Session Goals / Objectives:  ? Explained the key concepts of SMART goal setting framework  ? Identified three goals successfully using SMART goal setting framework  ? Reviewed concept of balance in wellness as it pertains to goal setting         Patient Participation / Response:  Fully participated with the group by sharing personal reflections / insights and openly received / provided feedback with other participants.    Demonstrated understanding of topics discussed through group discussion and participation and Verbalized understanding of health maintenance topic    Treatment Plan:  Patient has a current master individualized treatment plan.  See Epic treatment plan for more information.    Lindsey Mederos, LICSW

## 2021-11-15 NOTE — GROUP NOTE
Service Modality:  Video Visit     Telemedicine Visit: The patient's condition can be safely assessed and treated via synchronous audio and visual telemedicine encounter.       Reason for Telemedicine Visit: Services only offered telehealth and due to COVID-19.     Originating Site (Patient Location): Patient's home     Distant Site (Provider Location): Provider Remote Setting- Home Office     Consent:  The patient/guardian has verbally consented to: the potential risks and benefits of telemedicine (video visit) versus in person care; bill my insurance or make self-payment for services provided; and responsibility for payment of non-covered services.      Patient would like the video invitation sent by:  My Chart     Mode of Communication:  Video Conference via Medical Zoom     As the provider I attest to compliance with applicable laws and regulations related to telemedicine.    Process Group Note    PATIENT'S NAME: Alexa Kline  MRN:   [id number]  :   1960  ACCT. NUMBER: [id number]  DATE OF SERVICE: 11/15/21  START TIME:  9:00 AM  END TIME:  9:50 AM  FACILITATOR: Lindsey Mederos LICSW  TOPIC:  Process Group    Diagnoses:  300.02 (F41.1) Generalized Anxiety Disorder.  311 (F32.9) Unspecified Depressive Disorder .      [facility] 55+ Program  TRACK: A2  NUMBER OF PARTICIPANTS: 8          Data:    Session content: At the start of this group, patients were invited to check in by identifying themselves, describing their current emotional status, and identifying issues to address in this group.   Area(s) of treatment focus addressed in this session included Symptom Management, Personal Safety, Community Resources/Discharge Planning, Abstinence/Relapse Prevention, Develop / Improve Independent Living Skills and Develop Socialization / Interpersonal Relationship Skills.    Alexa reports depressed and anxious mood with worry, rumination, poor concentration and paranoia. Denies S/I or safety issues.  She processed her feelings of frustration about her functioning level. She is having difficulty with some domestic responsibilities, such as finances. Her  is being supportive. She did try to make a meal and is engaged in self care. She reports being overwhelemed at times. We discussed self compassion and using CBT skills. She did talk with her son.      Therapeutic Interventions/Treatment Strategies:  Psychotherapist offered support, feedback and validation and reinforced use of skills. Treatment modalities used include Cognitive Behavioral Therapy.    Assessment:    Patient response:   Patient responded to session by accepting feedback, giving feedback, listening, focusing on goals, being attentive and verbalizing understanding    Possible barriers to participation / learning include: and no barriers identified    Health Issues:   Yes: She will consults with PCP for lab work.       Substance Use Review:   Substance Use: No active concerns identified.    Mental Status/Behavioral Observations  Appearance:   Appropriate   Eye Contact:   Good   Psychomotor Behavior: Normal   Attitude:   Cooperative  Interested  Orientation:   All  Speech   Rate / Production: Normal    Volume:  Normal   Mood:    Anxious  Depressed   Affect:    Appropriate  Worrisome   Thought Content:   Rumination, Psychosis reports paranoia and Safety denies any current safety concerns including suicidal ideation, self-harm, and homicidal ideation  Thought Form:  Coherent  Logical  Psychosis-feels/believes people are taking her identity.     Insight:    Good     Plan:     Safety Plan: No current safety concerns identified.  Recommended that patient call 911 or go to the local ED should there be a change in any of these risk factors.     Barriers to treatment: None identified    Patient Contracts (see media tab):  None    Substance Use: Not addressed in session     Continue or Discharge: Patient will continue in 55+ Program (55+) as planned.  Patient is likely to benefit from learning and using skills as they work toward the goals identified in their treatment plan. Alexa will continue for mood stabilization and symptom management education for mental health recovery.      Lindsey Mederos, [facility]  November 15, 2021

## 2021-11-15 NOTE — GROUP NOTE
Psychotherapy Group Note    PATIENT'S NAME: Alexa Kline  MRN:   [id number]  :   1960  ACCT. NUMBER: [id number]  DATE OF SERVICE: 11/15/21  START TIME: 10:00 AM  END TIME: 10:50 AM  FACILITATOR: Khadra Jarquin LICSW  TOPIC: MH EBP Group: Symptom Awareness  [facility] 55+ Program  TRACK: A2                              Service Modality:  Video Visit     Telemedicine Visit: The patient's condition can be safely assessed and treated via synchronous audio and visual telemedicine encounter.      Reason for Telemedicine Visit: Services only offered telehealth    Originating Site (Patient Location): Patient's home    Distant Site (Provider Location): [facility] MENTAL HEALTH & ADDICTION SERVICES    Consent:  The patient/guardian has verbally consented to: the potential risks and benefits of telemedicine (video visit) versus in person care; bill my insurance or make self-payment for services provided; and responsibility for payment of non-covered services.     Patient would like the video invitation sent by:  My Chart    Mode of Communication:  Video Conference via Medical Zoom    As the provider I attest to compliance with applicable laws and regulations related to telemedicine.         NUMBER OF PARTICIPANTS: 8    Summary of Group / Topics Discussed:  Symptom Awareness: Mood Disorders: Patients received a general overview of depression and how it relates to their current symptoms. The purpose is to promote understanding of their diagnoses and how it impacts their functioning. Patients reviewed their current awareness of symptoms and diagnoses. Patients received information regarding diagnoses, etiology, cultural, and environmental factors as well as impact on functioning.     Patient Session Goals / Objectives:    Discussed patient individual symptoms and experiences    Reviewed diagnostic criteria and etiology of diagnoses       Patient Participation / Response:  Fully participated with the  group by sharing personal reflections / insights and openly received / provided feedback with other participants.    Demonstrated understanding of topics discussed through group discussion and participation and Demonstrated understanding of how information regarding symptoms can assist in management of symptoms    Treatment Plan:  Patient has a current master individualized treatment plan.  See Epic treatment plan for more information.    Khadra Oquendo, LICSW

## 2021-11-17 NOTE — GROUP NOTE
Service Modality:  Video Visit     Telemedicine Visit: The patient's condition can be safely assessed and treated via synchronous audio and visual telemedicine encounter.       Reason for Telemedicine Visit: Services only offered telehealth and due to COVID-19.     Originating Site (Patient Location): Patient's home     Distant Site (Provider Location): Provider Remote Setting- Home Office     Consent:  The patient/guardian has verbally consented to: the potential risks and benefits of telemedicine (video visit) versus in person care; bill my insurance or make self-payment for services provided; and responsibility for payment of non-covered services.      Patient would like the video invitation sent by:  My Chart     Mode of Communication:  Video Conference via Medical Zoom     As the provider I attest to compliance with applicable laws and regulations related to telemedicine.    Process Group Note    PATIENT'S NAME: Alexa Kline  MRN:   [id number]  :   1960  ACCT. NUMBER: [id number]  DATE OF SERVICE: 21  START TIME: 10:00 AM  END TIME: 10:50 AM  FACILITATOR: Lindsey Mederos LICSW  TOPIC:  Process Group    Diagnoses:  300.02 (F41.1) Generalized Anxiety Disorder.  311 (F32.9) Unspecified Depressive Disorder .      [facility] 55+ Program  TRACK: A2    NUMBER OF PARTICIPANTS: 9          Data:    Session content: At the start of this group, patients were invited to check in by identifying themselves, describing their current emotional status, and identifying issues to address in this group.   Area(s) of treatment focus addressed in this session included Symptom Management, Personal Safety, Community Resources/Discharge Planning, Abstinence/Relapse Prevention, Develop / Improve Independent Living Skills and Develop Socialization / Interpersonal Relationship Skills.    Alexa reports depressed and anxious mood. She reports improved concentration and is able to read and focus on a book. Yogiies  S/I or safety issues. She reports increase need for sleep.  She processed feelings overwhelemed when the printer did not work and having to go through the process of enrolled for Medicare. Her  is being supportive and is helpful. Alexa processed her feelings with how she is functioning. She reports avoidance. Writer offered reassurance, validation as well as encouragement.      Therapeutic Interventions/Treatment Strategies:  Psychotherapist offered support, feedback and validation and reinforced use of skills. Treatment modalities used include Cognitive Behavioral Therapy.    Assessment:    Patient response:   Patient responded to session by verbalizing understanding    Possible barriers to participation / learning include: and no barriers identified    Health Issues:   None reported       Substance Use Review:   Substance Use: No active concerns identified.    Mental Status/Behavioral Observations  Appearance:   Appropriate   Eye Contact:   Good   Psychomotor Behavior: Normal   Attitude:   Cooperative  Interested  Orientation:   All  Speech   Rate / Production: Normal    Volume:  Normal   Mood:    Anxious  depressed mood  Affect:    Appropriate  Worrisome   Thought Content:   Rumination, Psychosis reports paranoia and Safety denies any current safety concerns including suicidal ideation, self-harm, and homicidal ideation  Thought Form:  Coherent  Goal Directed  Logical     Insight:    Good     Plan:     Safety Plan: No current safety concerns identified.  Recommended that patient call 911 or go to the local ED should there be a change in any of these risk factors.     Barriers to treatment: None identified    Patient Contracts (see media tab):  None    Substance Use: Not addressed in session     Continue or Discharge: Patient will continue in 55+ Program (55+) as planned. Patient is likely to benefit from learning and using skills as they work toward the goals identified in their treatment plan. Alexa will  continue for mood stabilization and symptom management education for mental health recovery.      Lindsey Mederos, [facility]  November 17, 2021

## 2021-11-17 NOTE — GROUP NOTE
Psychotherapy Group Note    PATIENT'S NAME: Alexa Kline  MRN:   [id number]  :   1960  ACCT. NUMBER: [id number]  DATE OF SERVICE: 21  START TIME:  9:00 AM  END TIME:  9:50 AM  FACILITATOR: Khadra Jarquin LICSW  TOPIC: MH EBP Group: Relationship Skills  [facility] 55+ Program  TRACK: A2                              Service Modality:  Video Visit     Telemedicine Visit: The patient's condition can be safely assessed and treated via synchronous audio and visual telemedicine encounter.      Reason for Telemedicine Visit: Services only offered telehealth    Originating Site (Patient Location): Patient's home    Distant Site (Provider Location): [facility] MENTAL HEALTH & ADDICTION SERVICES    Consent:  The patient/guardian has verbally consented to: the potential risks and benefits of telemedicine (video visit) versus in person care; bill my insurance or make self-payment for services provided; and responsibility for payment of non-covered services.     Patient would like the video invitation sent by:  My Chart    Mode of Communication:  Video Conference via Medical Zoom    As the provider I attest to compliance with applicable laws and regulations related to telemedicine.         NUMBER OF PARTICIPANTS: 9    Summary of Group / Topics Discussed:  Relationship Skills: Conflict Resolution: Topic of conflict resolution in interpersonal communication was discussed. Patients received an overview of how communication skills during times of conflict impact symptoms and functioning. Patients discussed their current communication challenges and how this impacts their functioning. Patients also verbalized understanding of skills learned and practiced in session.     Patient Session Goals / Objectives:    Identified and discussed patient individual challenges with communication    Presented and practiced effective communication skills in session    Assisted patients in implementing more effective  communication skills in their relationships      Patient Participation / Response:  Minimally participated, only when prompted / asked.    Demonstrated understanding of topics discussed through group discussion and participation and Demonstrated understanding of relationship skills and communication skills    Treatment Plan:  Patient has a current master individualized treatment plan.  See Epic treatment plan for more information.    Khadra Oquendo, ALLYSONSW

## 2021-11-17 NOTE — GROUP NOTE
Psychotherapy Group Note    PATIENT'S NAME: Alexa Kline  MRN:   [id number]  :   1960  ACCT. NUMBER: [id number]  DATE OF SERVICE: 21  START TIME: 11:00 AM  END TIME: 11:50 AM  FACILITATOR: Khadra Jarquin LICSW  TOPIC: MH EBP Group: Behavioral Activation  [facility] 55+ Program  TRACK: A2                              Service Modality:  Video Visit     Telemedicine Visit: The patient's condition can be safely assessed and treated via synchronous audio and visual telemedicine encounter.      Reason for Telemedicine Visit: Services only offered telehealth    Originating Site (Patient Location): Patient's home    Distant Site (Provider Location): [facility] MENTAL HEALTH & ADDICTION SERVICES    Consent:  The patient/guardian has verbally consented to: the potential risks and benefits of telemedicine (video visit) versus in person care; bill my insurance or make self-payment for services provided; and responsibility for payment of non-covered services.     Patient would like the video invitation sent by:  My Chart    Mode of Communication:  Video Conference via Medical Zoom    As the provider I attest to compliance with applicable laws and regulations related to telemedicine.         NUMBER OF PARTICIPANTS: 9    Summary of Group / Topics Discussed:  Behavioral Activation: Motivation and Procrastination: Patients explored how they currently spend their time, identifying thoughts and feelings that are motivating and serve to increase desired behaviors.  They also examined behaviors that contribute to procrastination.  Different types of procrastination behaviors were identified, and strategies to reduce individual procrastination and increase motivation were explored and practiced.  Patients identified ways to increase goal-directed activities to enhance mood and reduce symptoms.        Patient Session Goals / Objectives:    Identify current patterns of procrastination behavior and how they  influence thoughts and moods, and inhibit motivation.    Identify behaviors that can be implemented that contribute to improving thoughts and feelings, motivation, and reduce symptoms.    Identify and develop a plan to increase activities that promote a sense of accomplishment and competence.    Practice scheduling positive activities / behaviors into daily routines.      Patient Participation / Response:  Minimally participated, only when prompted / asked.    Demonstrated understanding of topics discussed through group discussion and participation and Expressed understanding of the relationship between behaviors, thoughts, and feelings    Treatment Plan:  Patient has a current master individualized treatment plan.  See Epic treatment plan for more information.    Khadra Oquendo, LICSW

## 2021-11-18 NOTE — GROUP NOTE
Service Modality:  Video Visit     Telemedicine Visit: The patient's condition can be safely assessed and treated via synchronous audio and visual telemedicine encounter.       Reason for Telemedicine Visit: Services only offered telehealth and due to COVID-19.     Originating Site (Patient Location): Patient's home     Distant Site (Provider Location): Provider Remote Setting- Home Office     Consent:  The patient/guardian has verbally consented to: the potential risks and benefits of telemedicine (video visit) versus in person care; bill my insurance or make self-payment for services provided; and responsibility for payment of non-covered services.      Patient would like the video invitation sent by:  My Chart     Mode of Communication:  Video Conference via Medical Zoom     As the provider I attest to compliance with applicable laws and regulations related to telemedicine.    Process Group Note    PATIENT'S NAME: Alexa Kline  MRN:   [id number]  :   1960  ACCT. NUMBER: [id number]  DATE OF SERVICE: 21  START TIME:  9:00 AM  END TIME:  9:50 AM  FACILITATOR: Lindsey Mederos LICSW  TOPIC:  Process Group    Diagnoses:  300.02 (F41.1) Generalized Anxiety Disorder.  311 (F32.9) Unspecified Depressive Disorder .      [facility] 55+ Program  TRACK: A2    NUMBER OF PARTICIPANTS: 7          Data:    Session content: At the start of this group, patients were invited to check in by identifying themselves, describing their current emotional status, and identifying issues to address in this group.   Area(s) of treatment focus addressed in this session included Symptom Management, Personal Safety, Community Resources/Discharge Planning, Abstinence/Relapse Prevention, Develop / Improve Independent Living Skills and Develop Socialization / Interpersonal Relationship Skills.    Alexa reports depressed and anxious mood. She feels fatigue. Denies S/I or safety issues. She processed her frustration with  the acuity of her mood symptoms. She is having difficulty with completing tasks with finances and making decisions.  Her  is supportive and is assisting with tasks. She is concerned that it may be too much for him. She was encouraged to have an open dialogue about roles of support and domestic responsibilities. Writer provided reassurance and validation. She is able to complete her ADLs and self care. Alexa is using coping skills for symptom management.      Therapeutic Interventions/Treatment Strategies:  Psychotherapist offered support, feedback and validation and reinforced use of skills. Treatment modalities used include Cognitive Behavioral Therapy.    Assessment:    Patient response:   Patient responded to session by verbalizing understanding    Possible barriers to participation / learning include: and no barriers identified    Health Issues:   Yes: Fatigue       Substance Use Review:   Substance Use: No active concerns identified.    Mental Status/Behavioral Observations  Appearance:   Appropriate   Eye Contact:   Good   Psychomotor Behavior: Normal   Attitude:   Cooperative  Interested  Orientation:   All  Speech   Rate / Production: Normal    Volume:  Normal   Mood:    Anxious  Depressed   Affect:    Appropriate  Worrisome   Thought Content:   Rumination and Psychosis reports paranoia  Thought Form:  Coherent  Goal Directed  Logical     Insight:    Good     Plan:     Safety Plan: No current safety concerns identified.  Recommended that patient call 911 or go to the local ED should there be a change in any of these risk factors.     Barriers to treatment: None identified    Patient Contracts (see media tab):  None    Substance Use: Not addressed in session     Continue or Discharge: Patient will continue in 55+ Program (55+) as planned. Patient is likely to benefit from learning and using skills as they work toward the goals identified in their treatment plan.  Alexa will continue for mood  stabilization and symptom management education for mental health recovery.      Lindsey Mederos, [facility]  November 18, 2021

## 2021-11-18 NOTE — GROUP NOTE
Service Modality:  Video Visit     Telemedicine Visit: The patient's condition can be safely assessed and treated via synchronous audio and visual telemedicine encounter.       Reason for Telemedicine Visit: Services only offered telehealth and due to COVID-19.     Originating Site (Patient Location): Patient's home     Distant Site (Provider Location): Provider Remote Setting- Home Office     Consent:  The patient/guardian has verbally consented to: the potential risks and benefits of telemedicine (video visit) versus in person care; bill my insurance or make self-payment for services provided; and responsibility for payment of non-covered services.      Patient would like the video invitation sent by:  My Chart     Mode of Communication:  Video Conference via Medical Zoom     As the provider I attest to compliance with applicable laws and regulations related to telemedicine.    Psychotherapy Group Note    PATIENT'S NAME: Alexa Kline  MRN:   [id number]  :   1960  ACCT. NUMBER: [id number]  DATE OF SERVICE: 21  START TIME: 11:00 AM  END TIME: 11:50 AM  [facility]  TOPIC:  EBP Group: Coping Skills  [facility] 55+ Program  TRACK: A2    NUMBER OF PARTICIPANTS: 7    Summary of Group / Topics Discussed:  Coping Skills: Self-Soothe: Patients learned to apply self-soothe as a way to decrease heightened stress in the moment.  Patients identified situations that necessitate self-soothe strategies.  They focused on ways to manage physical symptoms of distress using the senses. They discussed how to distinguish when this can be useful in their lives when other strategies are more relevant or helpful.    Patient Session Goals / Objectives:    Understand the purpose of using the senses to decrease distress    Process what happens in the body when using self-soothe strategies    Demonstrate understanding of when to use self-soothe strategies    Identify and problem solve  barriers to applying self-soothe strategies.    Choose 1-2 self-soothe strategies to apply during times of distress.        Patient Participation / Response:  Fully participated with the group by sharing personal reflections / insights and openly received / provided feedback with other participants.    Demonstrated understanding of topics discussed through group discussion and participation    Treatment Plan:  Patient has a current master individualized treatment plan.  See Epic treatment plan for more information.    Lindsey Mederos, ALLYSONSW

## 2021-11-18 NOTE — GROUP NOTE
Psychotherapy Group Note    PATIENT'S NAME: Alexa Kline  MRN:   [id number]  :   1960  ACCT. NUMBER: [id number]  DATE OF SERVICE: 21  START TIME: 10:00 AM  END TIME: 10:50 AM  FACILITATOR: Khadra Jarquin LICSW  TOPIC: MH EBP Group: Self-Awareness  [facility] 55+ Program  TRACK: A2                              Service Modality:  Video Visit     Telemedicine Visit: The patient's condition can be safely assessed and treated via synchronous audio and visual telemedicine encounter.      Reason for Telemedicine Visit: Services only offered telehealth    Originating Site (Patient Location): Patient's home    Distant Site (Provider Location): [facility]ENT[facility] HE[facility]    Consent:  The patient/guardian has verbally consented to: the potential risks and benefits of telemedicine (video visit) versus in person care; bill my insurance or make self-payment for services provided; and responsibility for payment of non-covered services.     Patient would like the video invitation sent by:  My Chart    Mode of Communication:  Video Conference via Medical Zoom    As the provider I attest to compliance with applicable laws and regulations related to telemedicine.         NUMBER OF PARTICIPANTS: 8    Summary of Group / Topics Discussed:  Self-Awareness: Gratitude/GLADLYGO: Topic focused on assisting patients in identifying stephen concepts in gratitude and acknowledging progress. Patients discussed the benefits of practicing gratitude and its impact on mood improvement, mindfulness, and perspective. Patients worked to increase time spent on recognition and appreciation of what is positive and working in their lives. Patients discussed the concepts and benefits of feeling grateful. The goal is to reduce rumination and negative thinking resulting in increased mindfulness and resilience. Patients specifically discussed how they can practice and problem solve barriers to daily gratitude  practice.     Patient Session Goals / Objectives:    [place] the concept and benefits of gratitude    Identified ways to practice gratitude in daily life    Problem solved barriers to practicing gratitude      Patient Participation / Response:  Fully participated with the group by sharing personal reflections / insights and openly received / provided feedback with other participants.    Demonstrated understanding of topics discussed through group discussion and participation and Practiced skills in session    Treatment Plan:  Patient has a current master individualized treatment plan.  See Epic treatment plan for more information.    ALLYSON EscuderoSW

## 2021-11-22 NOTE — PROGRESS NOTES
"Patient seen via telemedicine. [employer] visit.    Seen in follow up at day treatment.      HPI: Since my last note her psychiatrist Dr. Lopez has decreased Depakote to once daily, changed Lexapro to Zoloft, and Klonopin, Zyprexa, & Trazodone are the same.    She rates mood as \"2 out of 10\" where 10 is a good mood.  She has constipation and hair loss (probably both from Depakote.    [facility] notes reviewed.  CBC WNL, CMP and Ammonia WNL, Depakote= 15.2, QTC = 460    My diagnosis is Bipolar 2.  Her doctor had diagnoses her as depressed, but she had a manic spell from Prednisone.  Her sister with Bipolar illness recommended she speak with her doctor about these meds.    Lamictal and Tegretol discussed, including SJS side effect.    Plan: Continue Day treatment  2.  She sees her psychiatrist in about 2 weelks.  I recommended she contact her and discuss Lamictal in view of poor response to current antidepressant.      Video-Visit Details    Type of service:  Video Visit    Video Start Time (time video started): 1500    Video End Time (time video stopped): 1520    Originating Location (pt. Location): Home    Distant Location (provider location): Provider remote location    Mode of Communication:  Video Conference via [employer]    Physician has received verbal consent for a Video Visit from the patient? Yes      Jostin Hook MD     \  "

## 2021-11-22 NOTE — GROUP NOTE
Psychotherapy Group Note    PATIENT'S NAME: Alexa Kline  MRN:   [id number]  :   1960  ACCT. NUMBER: [id number]  DATE OF SERVICE: 21  START TIME: 11:00 AM  END TIME: 11:50 AM  FACILITATOR: Caridad Sharma LGSW  TOPIC:  EBP Group: Behavioral Activation  [facility] 55+ Program  TRACK: A2    NUMBER OF PARTICIPANTS:     Summary of Group / Topics Discussed:  Behavioral Activation: Activity Scheduling:Patients explored how they currently spend their time, and how specific behaviors impact thoughts and feelings.  The group explored the effect of negative and positive activities on mood states and thought patterns.  Patients identified activities that help to improve mood and thinking patterns, and developed a plan to implement positive activities between sessions.      Patient Session Goals / Objectives:    Identify impact of current behaviors on thoughts and mood    Identify 2-3 behavioral changes that could have a positive impact on thoughts and mood    Prepare to make desired behavioral change: Create a change plan / activity schedule                                    Service Modality:  Video Visit     Telemedicine Visit: The patient's condition can be safely assessed and treated via synchronous audio and visual telemedicine encounter.      Reason for Telemedicine Visit: Services only offered telehealth    Originating Site (Patient Location): Patient's home    Distant Site (Provider Location): Provider Remote Setting- Home Office    Consent:  The patient/guardian has verbally consented to: the potential risks and benefits of telemedicine (video visit) versus in person care; bill my insurance or make self-payment for services provided; and responsibility for payment of non-covered services.     Patient would like the video invitation sent by:  My Chart    Mode of Communication:  Video Conference via Medical Zoom    As the provider I attest to compliance with applicable laws and regulations related  to telemedicine.            Patient Participation / Response:  Fully participated with the group by sharing personal reflections / insights and openly received / provided feedback with other participants.    Demonstrated understanding of topics discussed through group discussion and participation and Expressed understanding of the relationship between behaviors, thoughts, and feelings    Treatment Plan:  Patient has a current master individualized treatment plan.  See Epic treatment plan for more information.    Caridad Sharma LGSW

## 2021-11-22 NOTE — GROUP NOTE
"Psychotherapy Group Note    PATIENT'S NAME: Alexa Kline  MRN:   [id number]  :   1960  ACCT. NUMBER: [id number]  DATE OF SERVICE: 21  START TIME: 10:00 AM  END TIME: 10:50 AM  FACILITATOR: Khadra Jarquin LICSW  TOPIC: MH EBP Group: Coping Skills  [facility] 55+ Program  TRACK: A2                              Service Modality:  Video Visit     Telemedicine Visit: The patient's condition can be safely assessed and treated via synchronous audio and visual telemedicine encounter.      Reason for Telemedicine Visit: Services only offered telehealth    Originating Site (Patient Location): Patient's home    Distant Site (Provider Location): [facility] MENTAL HEALTH & ADDI[facility] SERVICES    Consent:  The patient/guardian has verbally consented to: the potential risks and benefits of telemedicine (video visit) versus in person care; bill my insurance or make self-payment for services provided; and responsibility for payment of non-covered services.     Patient would like the video invitation sent by:  My Chart    Mode of Communication:  Video Conference via Medical Zoom    As the provider I attest to compliance with applicable laws and regulations related to telemedicine.         NUMBER OF PARTICIPANTS: 8    Summary of Group / Topics Discussed:  Coping Skills: Additional Coping Skills:  Patients discussed and practiced \"Do Positive Experiences Stick to Your Ribs\" handout.  Reviewed the benefits of applying the aforementioned coping strategies.  Patients explored how these strategies might be applied to daily stressors or distressing situations.    Patient Session Goals / Objectives:    Understand the purpose and benefits of applying these coping strategies    Practiced noticing positive experiences    Address barriers to utilizing coping skills when in distress.        Patient Participation / Response:  Moderately participated, sharing some personal reflections / insights and adequately " adequately received / provided feedback with other participants.    Demonstrated understanding of topics discussed through group discussion and participation, Expressed understanding of the relevance / importance of coping skills at distressing times in life and Identified / Expressed personal readiness to practice new coping skills    Treatment Plan:  Patient has a current master individualized treatment plan.  See Epic treatment plan for more information.    Khadra Oquendo, LICSW

## 2021-11-22 NOTE — GROUP NOTE
"Process Group Note    PATIENT'S NAME: Alexa Kline  MRN:   [id number]  :   1960  ACCT. NUMBER: [id number]  DATE OF SERVICE: 21  START TIME:  9:00 AM  END TIME:  9:50 AM  FACILITATOR: Caridad Sharma LGSW  TOPIC:  Process Group    Diagnoses:  300.02 (F41.1) Generalized Anxiety Disorder.  311 (F32.9) Unspecified Depressive Disorder   [facility]  TRACK: A2    NUMBER OF PARTICIPANTS:                                       Service Modality:  Video Visit     Telemedicine Visit: The patient's condition can be safely assessed and treated via synchronous audio and visual telemedicine encounter.      Reason for Telemedicine Visit: Services only offered telehealth    Originating Site (Patient Location): Patient's home    Distant Site (Provider Location): Provider Remote Setting- Home Office    Consent:  The patient/guardian has verbally consented to: the potential risks and benefits of telemedicine (video visit) versus in person care; bill my insurance or make self-payment for services provided; and responsibility for payment of non-covered services.     Patient would like the video invitation sent by:  My Chart    Mode of Communication:  Video Conference via Medical Zoom    As the provider I attest to compliance with applicable laws and regulations related to telemedicine.                Data:    Session content: At the start of this group, patients were invited to check in by identifying themselves, describing their current emotional status, and identifying issues to address in this group.   Area(s) of treatment focus addressed in this session included Symptom Management, Personal Safety and Community Resources/Discharge Planning.  Client reported feeling \"flat\" today and that she was trying a new medication to improve depressive symptoms.  Client's goal for today was to navigate and secure COBRA insurance.  Client reported difficulty with getting a hold " "of [employer] service but planned on calling them tomorrow morning in an effort to reach them when they first open.  Client reported wanting to be more intentional about acknowledge that she \"can do some things\" instead of thinking about what she cannot do.  Client reported being unsure of what they were proud of today.  Writer suggested that they find pride in their presence in group today.  Client reported receiving \"lots of calling\" from her sisters as a source of support.  Client did not report any suicidal ideation, plan or intent.        Therapeutic Interventions/Treatment Strategies:  Psychotherapist offered support, feedback and validation and reinforced use of skills. Treatment modalities used include Cognitive Behavioral Therapy. Interventions include Behavioral Activation: Encouraged strategies to reduce individual procrastination and increase motivation by increasing goal-directed activities to enhance mood and reduce symptoms. and Cognitive Restructuring:  Explored impact of ineffective thoughts / distortions on mood and activity.    Assessment:    Patient response:   Patient responded to session by accepting feedback and listening    Possible barriers to participation / learning include: and no barriers identified    Health Issues:   None reported       Substance Use Review:   Substance Use: No active concerns identified.    Mental Status/Behavioral Observations  Appearance:   Appropriate   Eye Contact:   Good   Psychomotor Behavior: Normal   Attitude:   Cooperative  Interested Pleasant  Orientation:   All  Speech   Rate / Production: Normal    Volume:  Normal   Mood:    Anxious  Depressed   Affect:    Appropriate   Thought Content:   Clear and Safety denies any current safety concerns including suicidal ideation, self-harm, and homicidal ideation  Thought Form:  Coherent  Logical     Insight:    Good     Plan:     Safety Plan: No current safety concerns identified.  Recommended that patient call 911 or " go to the local ED should there be a change in any of these risk factors.     Barriers to treatment: None identified    Patient Contracts (see media tab):  None    Substance Use: Not addressed in session     Continue or Discharge: Patient will continue in 55+ Program (55+) as planned. Patient is likely to benefit from learning and using skills as they work toward the goals identified in their treatment plan.      CYRUS Cardenas  November 22, 2021

## 2021-11-29 ENCOUNTER — TELEPHONE (OUTPATIENT)
Dept: BEHAVIORAL HEALTH | Facility: CLINIC | Age: 61
End: 2021-11-29
Payer: COMMERCIAL

## 2021-11-29 NOTE — TELEPHONE ENCOUNTER
11/24/2021    The 55+ program line ([phone]) received a voicemail (forwarded to writer) from client's Emergency contact to report that client was currently in the hospital and could not attend group the next day.    Caridad Sharma LGSW

## 2022-02-17 PROBLEM — H46.9: Status: ACTIVE | Noted: 2021-01-01

## 2022-02-17 PROBLEM — Z79.899 HIGH RISK MEDICATION USE: Status: ACTIVE | Noted: 2021-01-01

## (undated) DEVICE — EYE PREP BETADINE 5% SOLUTION 30ML 0065-0411-30

## (undated) DEVICE — SUCTION MANIFOLD NEPTUNE 2 SYS 1 PORT 702-025-000

## (undated) DEVICE — SOL WATER IRRIG 500ML BOTTLE 2F7113

## (undated) DEVICE — SPONGE COTTONOID 1/2X3" 80-1407

## (undated) DEVICE — SPECIMEN CONTAINER 5OZ STERILE 2600SA

## (undated) DEVICE — SU PLAIN 6-0 G-1DA 18" 770G

## (undated) DEVICE — EYE KNIFE SIDEPORT CLEARCUT 1MM ANG 8065921540

## (undated) DEVICE — PEN MARKING SKIN TYCO DEVON DUAL TIP 31145868

## (undated) DEVICE — LINEN TOWEL PACK X5 5464

## (undated) DEVICE — DRSG TELFA 3X8" 1238

## (undated) DEVICE — ESU NDL COLORADO MICRO 3CM STR N103A

## (undated) DEVICE — PACK MINOR EYE CUSTOM ASC

## (undated) DEVICE — GLOVE PROTEXIS MICRO 7.5  2D73PM75

## (undated) DEVICE — ESU PENCIL W/COATED BLADE E2450H

## (undated) DEVICE — TUBING SUCTION 12"X1/4" N612

## (undated) DEVICE — SU PLAIN FAST ABSORB 5-0 PC-1 18" 1915G

## (undated) DEVICE — ESU GROUND PAD ADULT W/CORD E7507

## (undated) RX ORDER — ONDANSETRON 2 MG/ML
INJECTION INTRAMUSCULAR; INTRAVENOUS
Status: DISPENSED
Start: 2021-01-01

## (undated) RX ORDER — DEXAMETHASONE SODIUM PHOSPHATE 10 MG/ML
INJECTION, SOLUTION INTRAMUSCULAR; INTRAVENOUS
Status: DISPENSED
Start: 2021-01-01

## (undated) RX ORDER — PROPOFOL 10 MG/ML
INJECTION, EMULSION INTRAVENOUS
Status: DISPENSED
Start: 2021-01-01

## (undated) RX ORDER — ACETAMINOPHEN 325 MG/1
TABLET ORAL
Status: DISPENSED
Start: 2021-01-01

## (undated) RX ORDER — LIDOCAINE HYDROCHLORIDE 20 MG/ML
INJECTION, SOLUTION EPIDURAL; INFILTRATION; INTRACAUDAL; PERINEURAL
Status: DISPENSED
Start: 2021-01-01

## (undated) RX ORDER — GABAPENTIN 300 MG/1
CAPSULE ORAL
Status: DISPENSED
Start: 2021-01-01

## (undated) RX ORDER — FENTANYL CITRATE 50 UG/ML
INJECTION, SOLUTION INTRAMUSCULAR; INTRAVENOUS
Status: DISPENSED
Start: 2021-01-01